# Patient Record
Sex: FEMALE | Race: WHITE | Employment: OTHER | URBAN - METROPOLITAN AREA
[De-identification: names, ages, dates, MRNs, and addresses within clinical notes are randomized per-mention and may not be internally consistent; named-entity substitution may affect disease eponyms.]

---

## 2017-01-20 DIAGNOSIS — E78.5 DYSLIPIDEMIA: ICD-10-CM

## 2017-01-23 RX ORDER — ROSUVASTATIN CALCIUM 20 MG/1
TABLET, COATED ORAL
Qty: 90 TAB | Refills: 0 | Status: SHIPPED | OUTPATIENT
Start: 2017-01-23 | End: 2017-05-02 | Stop reason: SDUPTHER

## 2017-01-23 NOTE — TELEPHONE ENCOUNTER
Requested Prescriptions     Signed Prescriptions Disp Refills    rosuvastatin (CRESTOR) 20 mg tablet 90 Tab 0     Sig: TAKE 1 TABLET NIGHTLY     Authorizing Provider: Radha Rome     Ordering User: Dedrick Levin

## 2017-02-15 ENCOUNTER — TELEPHONE (OUTPATIENT)
Dept: CARDIOLOGY CLINIC | Age: 77
End: 2017-02-15

## 2017-02-15 ENCOUNTER — APPOINTMENT (OUTPATIENT)
Dept: GENERAL RADIOLOGY | Age: 77
DRG: 247 | End: 2017-02-15
Attending: EMERGENCY MEDICINE
Payer: MEDICARE

## 2017-02-15 ENCOUNTER — HOSPITAL ENCOUNTER (INPATIENT)
Age: 77
LOS: 1 days | Discharge: HOME | DRG: 247 | End: 2017-02-17
Attending: EMERGENCY MEDICINE | Admitting: INTERNAL MEDICINE
Payer: MEDICARE

## 2017-02-15 DIAGNOSIS — R77.8 ELEVATED TROPONIN: ICD-10-CM

## 2017-02-15 DIAGNOSIS — I15.9 SECONDARY HYPERTENSION: ICD-10-CM

## 2017-02-15 DIAGNOSIS — R07.89 OTHER CHEST PAIN: Primary | ICD-10-CM

## 2017-02-15 PROBLEM — I10 HTN (HYPERTENSION): Status: ACTIVE | Noted: 2017-02-15

## 2017-02-15 PROBLEM — R07.9 CHEST PAIN: Status: ACTIVE | Noted: 2017-02-15

## 2017-02-15 LAB
ALBUMIN SERPL BCP-MCNC: 3.6 G/DL (ref 3.5–5)
ALBUMIN/GLOB SERPL: 0.9 {RATIO} (ref 1.1–2.2)
ALP SERPL-CCNC: 39 U/L (ref 45–117)
ALT SERPL-CCNC: 21 U/L (ref 12–78)
ANION GAP BLD CALC-SCNC: 13 MMOL/L (ref 5–15)
AST SERPL W P-5'-P-CCNC: 21 U/L (ref 15–37)
BASOPHILS # BLD AUTO: 0 K/UL (ref 0–0.1)
BASOPHILS # BLD: 0 % (ref 0–1)
BILIRUB SERPL-MCNC: 0.2 MG/DL (ref 0.2–1)
BNP SERPL-MCNC: 286 PG/ML (ref 0–450)
BUN SERPL-MCNC: 27 MG/DL (ref 6–20)
BUN/CREAT SERPL: 33 (ref 12–20)
CALCIUM SERPL-MCNC: 9.3 MG/DL (ref 8.5–10.1)
CHLORIDE SERPL-SCNC: 105 MMOL/L (ref 97–108)
CK MB CFR SERPL CALC: 1.6 % (ref 0–2.5)
CK MB CFR SERPL CALC: 2.3 % (ref 0–2.5)
CK MB SERPL-MCNC: 1 NG/ML (ref 5–25)
CK MB SERPL-MCNC: 1.8 NG/ML (ref 5–25)
CK SERPL-CCNC: 63 U/L (ref 26–192)
CK SERPL-CCNC: 77 U/L (ref 26–192)
CO2 SERPL-SCNC: 24 MMOL/L (ref 21–32)
CREAT SERPL-MCNC: 0.81 MG/DL (ref 0.55–1.02)
EOSINOPHIL # BLD: 0.2 K/UL (ref 0–0.4)
EOSINOPHIL NFR BLD: 3 % (ref 0–7)
ERYTHROCYTE [DISTWIDTH] IN BLOOD BY AUTOMATED COUNT: 14.3 % (ref 11.5–14.5)
GLOBULIN SER CALC-MCNC: 4.2 G/DL (ref 2–4)
GLUCOSE BLD STRIP.AUTO-MCNC: 166 MG/DL (ref 65–100)
GLUCOSE SERPL-MCNC: 126 MG/DL (ref 65–100)
HCT VFR BLD AUTO: 41.2 % (ref 35–47)
HGB BLD-MCNC: 13.1 G/DL (ref 11.5–16)
INR PPP: 1 (ref 0.9–1.1)
LIPASE SERPL-CCNC: 215 U/L (ref 73–393)
LYMPHOCYTES # BLD AUTO: 38 % (ref 12–49)
LYMPHOCYTES # BLD: 3.1 K/UL (ref 0.8–3.5)
MCH RBC QN AUTO: 26.6 PG (ref 26–34)
MCHC RBC AUTO-ENTMCNC: 31.8 G/DL (ref 30–36.5)
MCV RBC AUTO: 83.7 FL (ref 80–99)
MONOCYTES # BLD: 0.4 K/UL (ref 0–1)
MONOCYTES NFR BLD AUTO: 5 % (ref 5–13)
NEUTS SEG # BLD: 4.3 K/UL (ref 1.8–8)
NEUTS SEG NFR BLD AUTO: 54 % (ref 32–75)
PLATELET # BLD AUTO: 431 K/UL (ref 150–400)
POTASSIUM SERPL-SCNC: 4.1 MMOL/L (ref 3.5–5.1)
PROT SERPL-MCNC: 7.8 G/DL (ref 6.4–8.2)
PROTHROMBIN TIME: 10.3 SEC (ref 9–11.1)
RBC # BLD AUTO: 4.92 M/UL (ref 3.8–5.2)
SERVICE CMNT-IMP: ABNORMAL
SODIUM SERPL-SCNC: 142 MMOL/L (ref 136–145)
TROPONIN I SERPL-MCNC: 0.14 NG/ML
TROPONIN I SERPL-MCNC: 1.04 NG/ML
WBC # BLD AUTO: 8 K/UL (ref 3.6–11)

## 2017-02-15 PROCEDURE — 80053 COMPREHEN METABOLIC PANEL: CPT | Performed by: EMERGENCY MEDICINE

## 2017-02-15 PROCEDURE — 74011250637 HC RX REV CODE- 250/637: Performed by: INTERNAL MEDICINE

## 2017-02-15 PROCEDURE — 93005 ELECTROCARDIOGRAM TRACING: CPT

## 2017-02-15 PROCEDURE — 96374 THER/PROPH/DIAG INJ IV PUSH: CPT

## 2017-02-15 PROCEDURE — 71020 XR CHEST PA LAT: CPT

## 2017-02-15 PROCEDURE — 99218 HC RM OBSERVATION: CPT

## 2017-02-15 PROCEDURE — 83880 ASSAY OF NATRIURETIC PEPTIDE: CPT | Performed by: EMERGENCY MEDICINE

## 2017-02-15 PROCEDURE — 36415 COLL VENOUS BLD VENIPUNCTURE: CPT | Performed by: EMERGENCY MEDICINE

## 2017-02-15 PROCEDURE — 74011250637 HC RX REV CODE- 250/637: Performed by: EMERGENCY MEDICINE

## 2017-02-15 PROCEDURE — 84484 ASSAY OF TROPONIN QUANT: CPT | Performed by: EMERGENCY MEDICINE

## 2017-02-15 PROCEDURE — 74011250636 HC RX REV CODE- 250/636: Performed by: INTERNAL MEDICINE

## 2017-02-15 PROCEDURE — 85025 COMPLETE CBC W/AUTO DIFF WBC: CPT | Performed by: EMERGENCY MEDICINE

## 2017-02-15 PROCEDURE — 96375 TX/PRO/DX INJ NEW DRUG ADDON: CPT

## 2017-02-15 PROCEDURE — 82550 ASSAY OF CK (CPK): CPT | Performed by: EMERGENCY MEDICINE

## 2017-02-15 PROCEDURE — 82962 GLUCOSE BLOOD TEST: CPT

## 2017-02-15 PROCEDURE — 99285 EMERGENCY DEPT VISIT HI MDM: CPT

## 2017-02-15 PROCEDURE — 83690 ASSAY OF LIPASE: CPT | Performed by: EMERGENCY MEDICINE

## 2017-02-15 PROCEDURE — 85610 PROTHROMBIN TIME: CPT | Performed by: EMERGENCY MEDICINE

## 2017-02-15 RX ORDER — UBIDECARENONE 50 MG
100 CAPSULE ORAL DAILY
Status: DISCONTINUED | OUTPATIENT
Start: 2017-02-16 | End: 2017-02-17 | Stop reason: HOSPADM

## 2017-02-15 RX ORDER — GUAIFENESIN 100 MG/5ML
81 LIQUID (ML) ORAL DAILY
Status: DISCONTINUED | OUTPATIENT
Start: 2017-02-16 | End: 2017-02-15

## 2017-02-15 RX ORDER — PREGABALIN 75 MG/1
150 CAPSULE ORAL 2 TIMES DAILY
Status: DISCONTINUED | OUTPATIENT
Start: 2017-02-15 | End: 2017-02-17 | Stop reason: HOSPADM

## 2017-02-15 RX ORDER — TRAMADOL HYDROCHLORIDE 50 MG/1
50 TABLET ORAL
Status: DISCONTINUED | OUTPATIENT
Start: 2017-02-15 | End: 2017-02-17 | Stop reason: HOSPADM

## 2017-02-15 RX ORDER — ERGOCALCIFEROL 1.25 MG/1
50000 CAPSULE ORAL
Status: DISCONTINUED | OUTPATIENT
Start: 2017-02-19 | End: 2017-02-17 | Stop reason: HOSPADM

## 2017-02-15 RX ORDER — PREGABALIN 150 MG/1
150 CAPSULE ORAL 2 TIMES DAILY
COMMUNITY
End: 2018-01-21

## 2017-02-15 RX ORDER — INSULIN LISPRO 100 [IU]/ML
INJECTION, SOLUTION INTRAVENOUS; SUBCUTANEOUS
Status: DISCONTINUED | OUTPATIENT
Start: 2017-02-15 | End: 2017-02-17 | Stop reason: HOSPADM

## 2017-02-15 RX ORDER — SODIUM CHLORIDE 0.9 % (FLUSH) 0.9 %
5-10 SYRINGE (ML) INJECTION AS NEEDED
Status: DISCONTINUED | OUTPATIENT
Start: 2017-02-15 | End: 2017-02-17 | Stop reason: HOSPADM

## 2017-02-15 RX ORDER — DEXTROSE 50 % IN WATER (D50W) INTRAVENOUS SYRINGE
12.5-25 AS NEEDED
Status: DISCONTINUED | OUTPATIENT
Start: 2017-02-15 | End: 2017-02-17 | Stop reason: HOSPADM

## 2017-02-15 RX ORDER — ASPIRIN 325 MG
325 TABLET ORAL DAILY
Status: DISCONTINUED | OUTPATIENT
Start: 2017-02-16 | End: 2017-02-16

## 2017-02-15 RX ORDER — ENOXAPARIN SODIUM 100 MG/ML
40 INJECTION SUBCUTANEOUS EVERY 24 HOURS
Status: DISCONTINUED | OUTPATIENT
Start: 2017-02-15 | End: 2017-02-15

## 2017-02-15 RX ORDER — FENOFIBRATE 145 MG/1
145 TABLET, COATED ORAL EVERY EVENING
Status: DISCONTINUED | OUTPATIENT
Start: 2017-02-16 | End: 2017-02-17 | Stop reason: HOSPADM

## 2017-02-15 RX ORDER — MORPHINE SULFATE 2 MG/ML
2 INJECTION, SOLUTION INTRAMUSCULAR; INTRAVENOUS
Status: DISCONTINUED | OUTPATIENT
Start: 2017-02-15 | End: 2017-02-17 | Stop reason: HOSPADM

## 2017-02-15 RX ORDER — THERA TABS 400 MCG
1 TAB ORAL DAILY
Status: DISCONTINUED | OUTPATIENT
Start: 2017-02-16 | End: 2017-02-17 | Stop reason: HOSPADM

## 2017-02-15 RX ORDER — FLUTICASONE PROPIONATE 50 MCG
2 SPRAY, SUSPENSION (ML) NASAL
Status: DISCONTINUED | OUTPATIENT
Start: 2017-02-15 | End: 2017-02-17 | Stop reason: HOSPADM

## 2017-02-15 RX ORDER — METFORMIN HYDROCHLORIDE 1000 MG/1
1000 TABLET ORAL 2 TIMES DAILY WITH MEALS
Status: ON HOLD | COMMUNITY
End: 2017-02-16

## 2017-02-15 RX ORDER — MAGNESIUM SULFATE 100 %
4 CRYSTALS MISCELLANEOUS AS NEEDED
Status: DISCONTINUED | OUTPATIENT
Start: 2017-02-15 | End: 2017-02-17 | Stop reason: HOSPADM

## 2017-02-15 RX ORDER — LISINOPRIL 5 MG/1
10 TABLET ORAL DAILY
Status: DISCONTINUED | OUTPATIENT
Start: 2017-02-16 | End: 2017-02-17 | Stop reason: HOSPADM

## 2017-02-15 RX ORDER — ROSUVASTATIN CALCIUM 10 MG/1
20 TABLET, COATED ORAL
Status: DISCONTINUED | OUTPATIENT
Start: 2017-02-15 | End: 2017-02-17 | Stop reason: HOSPADM

## 2017-02-15 RX ORDER — SODIUM CHLORIDE 0.9 % (FLUSH) 0.9 %
5-10 SYRINGE (ML) INJECTION EVERY 8 HOURS
Status: DISCONTINUED | OUTPATIENT
Start: 2017-02-15 | End: 2017-02-17 | Stop reason: HOSPADM

## 2017-02-15 RX ORDER — GUAIFENESIN 100 MG/5ML
81 LIQUID (ML) ORAL
COMMUNITY

## 2017-02-15 RX ORDER — CARVEDILOL 3.12 MG/1
3.12 TABLET ORAL 2 TIMES DAILY WITH MEALS
Status: DISCONTINUED | OUTPATIENT
Start: 2017-02-16 | End: 2017-02-17 | Stop reason: HOSPADM

## 2017-02-15 RX ORDER — ENOXAPARIN SODIUM 100 MG/ML
1 INJECTION SUBCUTANEOUS EVERY 12 HOURS
Status: DISCONTINUED | OUTPATIENT
Start: 2017-02-16 | End: 2017-02-16

## 2017-02-15 RX ORDER — THERA TABS 400 MCG
1 TAB ORAL DAILY
COMMUNITY

## 2017-02-15 RX ADMIN — PREGABALIN 150 MG: 75 CAPSULE ORAL at 21:59

## 2017-02-15 RX ADMIN — ESTROGENS, CONJUGATED 0.62 MG: 0.62 TABLET, FILM COATED ORAL at 22:28

## 2017-02-15 RX ADMIN — ROSUVASTATIN CALCIUM 20 MG: 10 TABLET, FILM COATED ORAL at 21:59

## 2017-02-15 RX ADMIN — NITROGLYCERIN 1 INCH: 20 OINTMENT TOPICAL at 17:27

## 2017-02-15 RX ADMIN — Medication 2 MG: at 23:16

## 2017-02-15 NOTE — PROGRESS NOTES
[facility]: MED RECONCILIATION    Comments/Recommendations:  Patient brought a list of her medications from her doctor's visit today. She is knowledgeable to doses and last time taken. Medications added:     · None    Medications removed:    · Chlorpheniramine 4mg  · Meclizine 12.5mg    Medications adjusted:    · Ergocalciferol 50,000 units every   · Lisinopril 5mg daily  · Premarin 0.625mg nightly    Information obtained from:   Patient    Significant PMH/Disease States:   Patient Active Problem List   Diagnosis Code    Takotsubo cardiomyopathy I51.81    CAD (coronary artery disease) I25.10    DM (diabetes mellitus) ([facility] [address] E11.9    Dyslipidemia E78.5     Past Medical History   Diagnosis Date    Arthritis     Fibromyalgia     High cholesterol     Hypertension     Other ill-defined conditions(799.89)      \"previous cardiac history\"     Chief Complaint for this Admission:   Chief Complaint   Patient presents with    Chest Pain    Hypertension     Allergies: Review of patient's allergies indicates no known allergies. Prior to Admission Medications:   Prior to Admission Medications   Prescriptions Last Dose Informant Patient Reported? Taking?   aspirin 81 mg chewable tablet 2/15/2017 at AM  Yes Yes   Sig: Take 81 mg by mouth daily. carvedilol (COREG) 3.125 mg tablet 2/15/2017 at AM  No Yes   Sig: TAKE 1 TABLET TWICE A DAY   co-enzyme Q-10 (CO Q-10) 50 mg capsule 2017 at PM  No Yes   Sig: Take 2 Caps by mouth daily. conjugated estrogens (PREMARIN) 0.625 mg tablet 2017 at PM  Yes Yes   Sig: Take 0.625 mg by mouth every evening.   ergocalciferol (VITAMIN D2) 50,000 unit capsule Unknown at Unknown time  Yes No   Sig: Take 50,000 Units by mouth every . fenofibric acid (TRILIPIX) 135 mg capsule 2017 at PM Self Yes Yes   Sig: Take 135 mg by mouth daily. fluticasone (FLONASE) 50 mcg/actuation nasal spray 2017 at PM Self Yes Yes   Si Sprays by Both Nostrils route nightly. lisinopril (PRINIVIL, ZESTRIL) 10 mg tablet   No No   Sig: TAKE 1 TABLET DAILY   Patient taking differently: Take 5 mg by mouth daily. metFORMIN (GLUCOPHAGE) 1,000 mg tablet 2/15/2017 at AM  Yes Yes   Sig: Take 1,000 mg by mouth two (2) times daily (with meals). omega-3 fatty acids-vitamin e (FISH OIL) 1,000 mg cap Unknown at Unknown time  Yes No   Sig: Take 1 Cap by mouth.   pregabalin (LYRICA) 150 mg capsule 2/15/2017 at AM  Yes Yes   Sig: Take 150 mg by mouth two (2) times a day. rosuvastatin (CRESTOR) 20 mg tablet 2/14/2017 at PM  No Yes   Sig: TAKE 1 TABLET NIGHTLY   Patient taking differently: Take 20 mg by mouth nightly. therapeutic multivitamin (THERAGRAN) tablet 2/14/2017 at PM  Yes Yes   Sig: Take 1 Tab by mouth daily. traMADol (ULTRAM) 50 mg tablet Unknown at Unknown time  Yes No   Sig: Take 50 mg by mouth every six (6) hours as needed for Pain.  Patient will take 2 tabs every 8 hours as needed      Facility-Administered Medications: None                Keiry Boyd, PharmD, BCPS  Contact:

## 2017-02-15 NOTE — IP AVS SNAPSHOT
Dionna Cooper 
 
 
 [address] [facility] [address] [address] 
[phone] Patient: Cresencio Gomez MRN: [id number] FWB:[id number] You are allergic to the following No active allergies Recent Documentation Height Weight BMI OB Status Smoking Status 1.676 m 83.2 kg 29.61 kg/m2 Hysterectomy Never Smoker Emergency Contacts Name Discharge Info Relation Home Work Mobile MoniMario DISCHARGE CAREGIVER [3] Spouse [3] [phone] [address] CAREGIVER [3] Child [2] [phone] About your hospitalization You were admitted on:  February 15, 2017 You last received care in the:  [facility] You were discharged on:  February 17, 2017 Unit phone number:  [phone] Why you were hospitalized Your primary diagnosis was:  Chest Pain Your diagnoses also included:  Dyslipidemia, Dm (Diabetes Mellitus) (Hcc), Cad (Coronary Artery Disease), Htn (Hypertension), Takotsubo Cardiomyopathy, Elevated Troponin, Nstemi (Non-St Elevated Myocardial Infarction) (formerly [facility]) Providers Seen During Your Hospitalizations Provider Role Specialty Primary office phone Scar Lopez MD Attending Provider Emergency Medicine [phone] Juan Manuel Rice MD Attending Provider Hospitalist [phone] Your Primary Care Physician (PCP) Primary Care Physician Office Phone Office Fax Razia Leiva [phone] Follow-up Information Follow up With Details Comments Contact Info Wojciech Mcguire MD On 3/2/2017 9 am with carotid duplex and see Dr Jai Cruz following  [address] [facility] 
[phone] [address] [facility] [address] 
[phone] Jana Fleming NP   [address] [place] [address] Primary Care [address] [place] [address] 
[phone] Your Appointments Thursday March 02, 2017  9:00 AM EST  
VASCULAR TEST with CONCEPCION MEDELLIN  
[facility] ([facility] SCHEDULING) [address] [facility] [address]  
[phone] Thursday March 02, 2017  9:40 AM EST HOSPITAL DISCHARGE with Victor Manuel Milan MD  
[facility] ([facility]) [address] [facility] [address]  
[phone] Current Discharge Medication List  
  
START taking these medications Dose & Instructions Dispensing Information Comments Morning Noon Evening Bedtime  
 ticagrelor 90 mg tablet Commonly known as:  [employer] Your next dose is: Today, Tomorrow Other:  _________ Dose:  90 mg Take 1 Tab by mouth every twelve (12) hours every twelve (12) hours. Indications: Thrombosis Prevention after PCI Quantity:  60 Tab Refills:  11 CONTINUE these medications which have CHANGED Dose & Instructions Dispensing Information Comments Morning Noon Evening Bedtime  
 lisinopril 10 mg tablet Commonly known as:  Neelam Hilton What changed:   
- how much to take 
- how to take this - when to take this 
- additional instructions Your next dose is: Today, Tomorrow Other:  _________ TAKE 1 TABLET DAILY Quantity:  90 Tab Refills:  3  
     
   
   
   
  
 rosuvastatin 20 mg tablet Commonly known as:  CRESTOR What changed:   
- how much to take 
- how to take this - when to take this 
- additional instructions Your next dose is: Today, Tomorrow Other:  _________ TAKE 1 TABLET NIGHTLY Quantity:  90 Tab Refills:  0 CONTINUE these medications which have NOT CHANGED Dose & Instructions Dispensing Information Comments Morning Noon Evening Bedtime  
 aspirin 81 mg chewable tablet Your next dose is: Today, Tomorrow Other:  _________ Dose:  81 mg Take 81 mg by mouth daily. Refills:  0  
     
   
   
   
  
 carvedilol 3.125 mg tablet Commonly known as:  Timbo Lindsborg Your next dose is: Today, Tomorrow Other:  _________ TAKE 1 TABLET TWICE A DAY Quantity:  180 Tab Refills:  1 co-enzyme Q-10 50 mg capsule Commonly known as:  CO Q-10 Your next dose is: Today, Tomorrow Other:  _________ Dose:  100 mg Take 2 Caps by mouth daily. Quantity:  60 Cap Refills:  3 FISH OIL 1,000 mg Cap Generic drug:  omega-3 fatty acids-vitamin e Your next dose is: Today, Tomorrow Other:  _________ Dose:  1 Cap Take 1 Cap by mouth. Refills:  0  
     
   
   
   
  
 FLONASE 50 mcg/actuation nasal spray Generic drug:  fluticasone Your next dose is: Today, Tomorrow Other:  _________ Dose:  2 Spray 2 Sprays by Both Nostrils route nightly. Refills:  0  
     
   
   
   
  
 metFORMIN 1,000 mg tablet Commonly known as:  GLUCOPHAGE Start taking on:  2/19/2017 Your next dose is: Today, Tomorrow Other:  _________ Dose:  1000 mg Take 1 Tab by mouth two (2) times daily (with meals). Quantity:  1 Tab Refills:  1  
     
   
   
   
  
 pregabalin 150 mg capsule Commonly known as:  Trini Al Your next dose is: Today, Tomorrow Other:  _________ Dose:  150 mg Take 150 mg by mouth two (2) times a day. Refills:  0 PREMARIN 0.625 mg tablet Generic drug:  conjugated estrogens Your next dose is: Today, Tomorrow Other:  _________ Dose:  0.625 mg Take 0.625 mg by mouth every evening. Refills:  0  
     
   
   
   
  
 therapeutic multivitamin tablet Commonly known as:  [facility] Your next dose is: Today, Tomorrow Other:  _________ Dose:  1 Tab Take 1 Tab by mouth daily. Refills:  0  
     
   
   
   
  
 traMADol 50 mg tablet Commonly known as:  ULTRAM  
   
Your next dose is: Today, Tomorrow Other:  _________ Dose:  50 mg Take 50 mg by mouth every six (6) hours as needed for Pain. Patient will take 2 tabs every 8 hours as needed Refills:  0  
     
   
   
   
  
 TRILIPIX 135 mg capsule Generic drug:  fenofibric acid Your next dose is: Today, Tomorrow Other:  _________ Dose:  135 mg Take 135 mg by mouth daily. Refills:  0  
     
   
   
   
  
 VITAMIN D2 50,000 unit capsule Generic drug:  ergocalciferol Your next dose is: Today, Tomorrow Other:  _________ Dose:  00448 Units Take 50,000 Units by mouth every Sunday. Refills:  0 Where to Get Your Medications Information on where to get these meds will be given to you by the nurse or doctor. ! Ask your nurse or doctor about these medications  
  metFORMIN 1,000 mg tablet  
 ticagrelor 90 mg tablet Discharge Instructions ACUTE DIAGNOSES: 
chest pain Chest pain Chest pain NSTEMI (non-ST elevated myocardial infarction) ([facility] NSTEMI (non-ST elevated myocardial infarction) ([facility] CHRONIC MEDICAL DIAGNOSES: 
Problem List as of 2/17/2017  Date Reviewed: 2/17/2017 Codes Class Noted - Resolved NSTEMI (non-ST elevated myocardial infarction) ([facility] ICD-10-CM: I21.4 ICD-9-CM: 410.70  2/17/2017 - Present HTN (hypertension) ICD-10-CM: I10 
ICD-9-CM: 401.9  2/15/2017 - Present Dyslipidemia ICD-10-CM: E78.5 ICD-9-CM: 272.4  6/25/2015 - Present Takotsubo cardiomyopathy ICD-10-CM: I51.81 
ICD-9-CM: 429.83  12/12/2012 - Present CAD (coronary artery disease) ICD-10-CM: I25.10 ICD-9-CM: 414.00  12/12/2012 - Present  Overview Addendum 12/12/2012 12:20 PM by Idania Osborne MD  
 CATH 12/11/12 Mid LAD: There was a diffuse 30 % stenosis. Distal LAD: There was a diffuse 20 % stenosis. Proximal circumflex: There was a discrete 60 % stenosis. Mid circumflex: There was a diffuse 60 % stenosis. 1st obtuse marginal: There was a discrete 20 % stenosis in the proximal third of the vessel segment. Proximal RCA: There was a discrete 20 % stenosis at a site with no prior intervention. Mid RCA: There was a discrete 20 % stenosis. Distal RCA: There was a 20 % stenosis. DM (diabetes mellitus) ([facility] ICD-10-CM: E11.9 ICD-9-CM: 250.00  12/12/2012 - Present * (Principal)RESOLVED: Chest pain ICD-10-CM: R07.9 ICD-9-CM: 786.50  2/15/2017 - 2/17/2017 RESOLVED: Elevated troponin ICD-10-CM: R79.89 ICD-9-CM: 790.6  2/15/2017 - 2/17/2017 RESOLVED: NSTEMI (non-ST elevated myocardial infarction) ([facility] ICD-10-CM: I21.4 ICD-9-CM: 410.70  12/10/2012 - 12/12/2012 DISCHARGE MEDICATIONS:  
  
 
 
· It is important that you take the medication exactly as they are prescribed. · Keep your medication in the bottles provided by the pharmacist and keep a list of the medication names, dosages, and times to be taken in your wallet. · Do not take other medications without consulting your doctor. DIET:  Cardiac Diet and Diabetic Diet ACTIVITY: Activity as tolerated ADDITIONAL INFORMATION: If you experience any of the following symptoms then please call your primary care physician or return to the emergency room if you cannot get hold of your doctor: Fever, chills, nausea, vomiting, diarrhea, change in mentation, falling, bleeding, shortness of breath. FOLLOW UP CARE: 
Dr. Scooby Canas, NP  you are to call and set up an appointment to see them in 2 weeks. Follow-up with Dr Rahel Brownlee on 3/2/17 at 9 am 
 
 
Information obtained by : 
I understand that if any problems occur once I am at home I am to contact my physician. I understand and acknowledge receipt of the instructions indicated above. Physician's or R.N.'s Signature                                                                  Date/Time Patient or Representative Signature                                                          Date/Time [facility] Office: [facility], [address] [place] [address] 5 1007 [facility] 
    [phone] [place] office:  [facility],[address] [place]  
                           [phone] Patient Discharge Instructions Ethan Holcomb / [id number] : 1940 Admitted 2/15/2017 Discharged: 2017 Cardiologist: Dr Vega Varner       PCP: Chaparro Lam NP Renal:   Pulm: 
Diagnosis:  
Patient Active Problem List  
Diagnosis Code  Takotsubo cardiomyopathy I51.81  
 CAD (coronary artery disease) I25.10  DM (diabetes mellitus) ([facility] E11.9  Dyslipidemia E78.5  Chest pain R07.9  
 HTN (hypertension) I10  
 Elevated troponin R79.89 Procedures/Test:   CATH 2017: stent to left circumflex Lab Results Component Value Date/Time Cholesterol, total 272 2015 10:42 AM  
Cholesterol, Total 150 10/14/2015 11:39 AM  
HDL Cholesterol 62 2015 10:42 AM  
LDL, calculated 185 2015 10:42 AM  
Triglyceride 389 2015 10:42 AM  
CHOL/HDL Ratio 5.2 2012 04:27 AM  
 
 
Lab Results Component Value Date/Time ALT (SGPT) 21 02/15/2017 04:20 PM  
AST (SGOT) 21 02/15/2017 04:20 PM  
Alk. phosphatase 39 02/15/2017 04:20 PM  
Bilirubin, total 0.2 02/15/2017 04:20 PM  
 
 
 
· It is important that you take the medication exactly as they are prescribed. · Keep your medication in the bottles provided by the pharmacist and keep a list of the medication names, dosages, and times to be taken in your wallet. · Do not take other medications without consulting your doctor. BRING ALL of YOUR MEDICINES TO YOUR OFFICE VISIT with .  
 
Cardiac Catheterization  Discharge Instructions *Check the puncture site frequently for swelling or bleeding. If you see any bleeding, lie down and apply pressure over the area with a clean town or washcloth. Notify your doctor for any redness, swelling, drainage or oozing from the puncture site. Notify your doctor for any fever or chills. *If the leg or arm with the puncture becomes cold, numb or painful, call Dr Rosalino Queen *Activity should be limited for the next 48 hours. Climb stairs as little as possible and avoid any stooping, bending or strenuous activity for 48 hours. No strenuous activity or heavy lifting over 10 lbs. for 7 days. · You may take a shower 24 hours after your cardiac catheterization. Be sure to get the dressing wet and then remove it; gently wash the area with warm soapy water. Pat dry and leave open to air. To help prevent infections, be sure to keep the cath site clean and dry. No lotions, creams, powders, ointments, etc. in the cath site for approximately 1 week. · Do not take a tub bath, get in a hot tub or swimming pool for approximately 5 days or until the cath site is completely healed. · Drink plenty of fluids for 24-48 hours after your cath to flush the contrast dye from your kidneys. No alcoholic beverages for 24 hours. You may resume your previous diet (low fat, low cholesterol) after your cath. · Do not drive or operate heavy machinery for 48 hours. · You may resume your usual diet. Drink more fluids than usual. 
· Have a responsible person drive you home and stay with you for at least 24 hours after your heart catheterization/angiography. · *After your cath, some bruising or discomfort is common during the healing process. Tylenol, 1-2 tablets every 6 hours as needed, is recommended if you experience any discomfort. If you experience any signs or symptoms of infection such as fever, chills, or poorly healing incision, persistent tenderness or swelling in the groin, redness and/or warmth to the touch, numbness, significant tingling or pain at the groin site or affected extremity, rash, drainage from the cath site, or if the leg feels tight or swollen, call your physician right away. ? If bleeding at the cath site occurs, take a clean gauze pad and apply direct pressure to the groin just above the puncture site. Call 911 immediately, and continue to apply direct pressure until an ambulance gets to your location. ? You may return to work  2  days after your cardiac cath if no groin bleeding. Activity: Resume normal activity letting fatigue be the guide. Do not lift over 10 pounds for 7 days. Diet: American Heart association, low fat, 1500 mg sodium  Diabetic. Call for or return to ER for recurrent or prolonged chest pain, shortness of breath, lightheadedness, dizzy, palpitations, passing out, swelling in the legs or abdomen, pain or swelling  At the cath site. Lifestyle changes IF you smoke, Stop smoking go to : PrimeLetters.ca. aspx for text reminders Watch [place] over [employer] a heart-healthy diet that is low in cholesterol, saturated fat, and salt, and is full of fruits, vegetables and whole-grains. Eat at least two servings of fish each week. You may get more details about how to eat healthy, but these tips can help you get started. Www.aha.com When should you call for help? Call 911 anytime you think you may need emergency care. For example, call if: You have signs of a heart attack. These may include: 
Chest pain or pressure. Sweating. Shortness of breath. Nausea or vomiting. Pain that spreads from the chest to the neck, jaw, or one or both shoulders or arms. Dizziness or lightheadedness. A fast or irregular pulse. After calling 911, chew 1 adult-strength aspirin. Wait for an ambulance. Do not try to drive yourself. You passed out (lost consciousness). You feel like you are having another heart attack. Call your doctor now or seek immediate medical care if: 
You have had any chest pain, even if it has gone away. You have new or increased shortness of breath. You are dizzy or lightheaded, or you feel like you may faint. Watch closely for changes in your health, and be sure to contact your doctor if you have any problem Information obtained by : 
I understand that if any problems occur once I am at home I am to contact my physician. I understand and acknowledge receipt of the instructions indicated above. R.N.'s Signature                                                                  Date/Time Patient or Representative Signature                                                          Date/Time Heart Attack: Care Instructions Your Care Instructions A heart attack (myocardial infarction, or MI) occurs when one or more of the coronary arteries, which supply the heart with oxygen-rich blood, is blocked. A blockage usually occurs when plaque inside the artery breaks open and a blood clot forms in the artery. After a heart attack, you may be worried about your future.  Over the next several weeks, your heart will start to heal. Though it can be hard to break old habits, you can prevent another heart attack by making some lifestyle changes and by taking medicines. You may use this information for ideas about what to do at home to speed your recovery. Follow-up care is a key part of your treatment and safety. Be sure to make and go to all appointments, and call your doctor if you are having problems. It's also a good idea to know your test results and keep a list of the medicines you take. How can you care for yourself at home? Activity · Until your doctor says it is okay, do not do strenuous exercise. And do not lift, pull, or push anything heavy. Ask your doctor what types of activities are safe for you. · If your doctor has not set you up with a cardiac rehabilitation (rehab) program, talk to him or her about whether that is right for you. Cardiac rehab includes supervised exercise. It also includes help with diet and lifestyle changes and emotional support. It may reduce your risk of future heart problems. · Increase your activities slowly. Take short rest breaks when you get tired. · If your doctor recommends it, get more exercise. Walking is a good choice. Bit by bit, increase the amount you walk every day. Try for at least 30 minutes on most days of the week. You also may want to swim, bike, or do other activities. Talk with your doctor before you start an exercise program to make sure it is safe for you. · Ask your doctor when you can drive, go back to work, and do other daily activities again. · You can have sex as soon as you feel ready for it. Often this means when you can easily walk around or climb stairs. Talk with your doctor if you have any concerns. If you are taking nitroglycerin, do not take erection-enhancing medicine such as sildenafil (Viagra), tadalafil (Cialis), or vardenafil (Levitra) . Lifestyle changes · Do not smoke. Smoking increases your risk of another heart attack.  If you need help quitting, talk to your doctor about stop-smoking programs and medicines. These can increase your chances of quitting for good. · Eat a heart-healthy diet that is low in saturated fat and salt, and is full of fruits, vegetables and whole-grains. Eat at least two servings of fish each week. You may get more details about how to eat healthy. But these tips can help you get started. · Stay at a healthy weight, or lose weight if you need to. Medicines · Be safe with medicines. Take your medicines exactly as prescribed. Call your doctor if you think you are having a problem with your medicine. You will get more details on the specific medicines your doctor prescribes. Do not stop taking your medicine unless your doctor tells you to. Not taking your medicine might raise your risk of having another heart attack. · You may need several medicines to help lower your risk of another heart attack. These include: ¨ Blood pressure medicines such as angiotensin-converting enzyme (ACE) inhibitors, ARBs (angiotensin II receptor blockers), and beta-blockers. ¨ Cholesterol medicine called statins. ¨ Aspirin and other blood thinners. These prevent blood clots that can cause a heart attack. · If your doctor has given you nitroglycerin, keep it with you at all times. If you have angina symptoms, such as chest pain or pressure, sit down and rest. Take the first dose of nitroglycerin as directed. If symptoms get worse or are not getting better within 5 minutes, call 911 right away. Stay on the phone. The emergency  will tell you what to do. · Do not take any over-the-counter medicines, vitamins, or herbal products without talking to your doctor first. 
Staying healthy · Manage other health conditions such as high blood pressure and diabetes. · Avoid colds and flu. Get a pneumococcal vaccine shot. If you have had one before, ask your doctor whether you need another dose.  Get the flu vaccine every year. If you must be around people with colds or flu, wash your hands often. · Be sure to tell your doctor about any angina symptoms you have had, even if they went away. Pay attention to your angina symptoms. Know what is typical for you and learn how to control it. Know when to call for help. · Talk to your family, friends, or a counselor about your feelings. It is normal to feel frightened, angry, hopeless, helpless, and even guilty. Talking openly about bad feelings can help you cope. If you have symptoms of depression, talk to your doctor. When should you call for help? Call 911 anytime you think you may need emergency care. For example, call if: 
· You have symptoms of a heart attack. These may include: ¨ Chest pain or pressure, or a strange feeling in the chest. 
¨ Sweating. ¨ Shortness of breath. ¨ Nausea or vomiting. ¨ Pain, pressure, or a strange feeling in the back, neck, jaw, or upper belly or in one or both shoulders or arms. ¨ Lightheadedness or sudden weakness. ¨ A fast or irregular heartbeat. After you call 911, the  may tell you to chew 1 adult-strength or 2 to 4 low-dose aspirin. Wait for an ambulance. Do not try to drive yourself. · You have angina symptoms (such as chest pain or pressure) that do not go away with rest or are not getting better within 5 minutes after you take a dose of nitroglycerin. · You passed out (lost consciousness). · You feel like you are having another heart attack. Call your doctor now or seek immediate medical care if: 
· You are having angina symptoms, such as chest pain or pressure, more often than usual, or the symptoms are different or worse than usual. 
· You have new or increased shortness of breath. · You are dizzy or lightheaded, or you feel like you may faint. Watch closely for changes in your health, and be sure to contact your doctor if you have any problems. Where can you learn more? Go to http://marty-mario.info/. Enter [id number] in the search box to learn more about \"Heart Attack: Care Instructions. \" Current as of: January 27, 2016 Content Version: 11.1 © 6226-8232 [employer]. Care instructions adapted under license by [employer] (which disclaims liability or warranty for this information). If you have questions about a medical condition or this instruction, always ask your healthcare professional. [address] any warranty or liability for your use of this information. Discharge Instructions Attachments/References TICAGRELOR (BY MOUTH) (ENGLISH) HEART FAILURE: AVOIDING TRIGGERS (ENGLISH) Discharge Orders None [facility] Announcement We are excited to announce that we are making your provider's discharge notes available to you in [facility]. You will see these notes when they are completed and signed by the physician that discharged you from your recent hospital stay. If you have any questions or concerns about any information you see in [facility], please call the Health Information Department where you were seen or reach out to your Primary Care Provider for more information about your plan of care. Introducing \[facility] [place] [facility]\"" & HEALTH SERVICES! Miguel Melo introduces [facility] patient portal. Now you can access parts of your medical record, email your doctor's office, and request medication refills online. 1. In your internet browser, go to [email or web address] 2. Click on the First Time User? Click Here link in the Sign In box. You will see the New Member Sign Up page. 3. Enter your [facility] Access Code exactly as it appears below. You will not need to use this code after youve completed the sign-up process. If you do not sign up before the expiration date, you must request a new code. · [facility] Access Code: [email or web address] Expires: 5/16/2017  3:58 PM 
 
 4. Enter the last four digits of your Social Security Number (xxxx) and Date of Birth (mm/dd/yyyy) as indicated and click Submit. You will be taken to the next sign-up page. 5. Create a PurpleTeal ID. This will be your PurpleTeal login ID and cannot be changed, so think of one that is secure and easy to remember. 6. Create a PurpleTeal password. You can change your password at any time. 7. Enter your Password Reset Question and Answer. This can be used at a later time if you forget your password. 8. Enter your e-mail address. You will receive e-mail notification when new information is available in [address] 9. Click Sign Up. You can now view and download portions of your medical record. 10. Click the Download Summary menu link to download a portable copy of your medical information. If you have questions, please visit the Frequently Asked Questions section of the [employer] website. Remember, [employer] is NOT to be used for urgent needs. For medical emergencies, dial 911. Now available from your iPhone and Android! General Information Please provide this summary of care documentation to your next provider. Patient Signature:  ____________________________________________________________ Date:  ____________________________________________________________  
  
Junior Stephan Provider Signature:  ____________________________________________________________ Date:  ____________________________________________________________ More Information Ticagrelor (By mouth) Ticagrelor ([id number]) Helps prevent stroke, heart attack, and other heart problems. This medicine is a blood thinner. Brand Name(s):Jose There may be other brand names for this medicine. When This Medicine Should Not Be Used: This medicine is not right for everyone.  Do not use it if you had an allergic reaction to ticagrelor, or if you have bleeding problems (such as a bleeding stomach ulcer) or a history of bleeding in your brain. How to Use This Medicine:  
Tablet · Your doctor will tell you how much medicine to use. Do not use more than directed. Take this medicine at the same time every day. · Your doctor may tell you to take aspirin with this medicine. Do not use more than 100 milligrams of aspirin per day. Check the labels of other medicines to make sure they do not contain aspirin. · If you cannot swallow the tablet, you may do this: ¨ Crush the tablet and mix it in a glass of water. Drink it right away. Rinse the glass with more water and drink that too, so you get all the medicine. ¨ You may give the tablet and water mixture through a nasogastric tube. Flush the tube with more water so you receive all the medicine. · This medicine should come with a Medication Guide. Ask your pharmacist for a copy if you do not have one. · Missed dose: Skip the missed dose and take your next dose as usual. Do not take extra medicine to make up for a missed dose. · Store the medicine in a closed container at room temperature, away from heat, moisture, and direct light. Drugs and Foods to Avoid: Ask your doctor or pharmacist before using any other medicine, including over-the-counter medicines, vitamins, and herbal products. · Some foods and medicines can affect how ticagrelor works. Tell your doctor if you are using any of the following: ¨ Atazanavir, carbamazepine, clarithromycin, digoxin, indinavir, itraconazole, ketoconazole, lovastatin, nefazodone, nelfinavir, phenobarbital, phenytoin, rifampin, ritonavir, saquinavir, simvastatin, telithromycin, voriconazole ¨ Blood thinner, including warfarin or heparin ¨ NSAID pain or arthritis medicine (including celecoxib, diclofenac, ibuprofen, naproxen) Warnings While Using This Medicine: · Tell your doctor if you are pregnant or breastfeeding, or if you have liver disease, lung or breathing problems (such as asthma or COPD), or a history of bleeding problems. · This medicine may cause you to bleed and bruise more easily, and it may take longer than usual for bleeding to stop. Be careful to avoid injuries. · Do not stop using this medicine unless your doctor tells you to. To stop it may increase your risk of a heart attack, blood clot, or other serious problem. · Tell any doctor or dentist who treats you that you are using this medicine. With your doctor's permission, you may need to stop using this medicine several days before you have surgery to reduce the risk of bleeding problems. Follow your doctor's instructions carefully. · Your doctor will do lab tests at regular visits to check on the effects of this medicine. Keep all appointments. · Keep all medicine out of the reach of children. Never share your medicine with anyone. Possible Side Effects While Using This Medicine:  
Call your doctor right away if you notice any of these side effects: · Allergic reaction: Itching or hives, swelling in your face or hands, swelling or tingling in your mouth or throat, chest tightness, trouble breathing · Bloody or black, tarry stools, red or dark brown urine · Fast, slow, or pounding heartbeat · Shortness of breath or trouble breathing · Unusual bleeding, bruising, or weakness · Vomiting of blood or material that looks like coffee grounds If you notice these less serious side effects, talk with your doctor:  
· Headache If you notice other side effects that you think are caused by this medicine, tell your doctor. Call your doctor for medical advice about side effects. You may report side effects to FDA at 5-811-FDA-9884 © 2016 [address] is for End User's use only and may not be sold, redistributed or otherwise used for commercial purposes. The above information is an  only.  It is not intended as medical advice for individual conditions or treatments. Talk to your doctor, nurse or pharmacist before following any medical regimen to see if it is safe and effective for you. Avoiding Triggers With Heart Failure: Care Instructions Your Care Instructions Triggers are anything that make your heart failure flare up. A flare-up is also called \"sudden heart failure\" or \"acute heart failure. \" When you have a flare-up, fluid builds up in your lungs, and you have problems breathing. You might need to go to the hospital. By watching for changes in your condition and avoiding triggers, you can prevent heart failure flare-ups. Follow-up care is a key part of your treatment and safety. Be sure to make and go to all appointments, and call your doctor if you are having problems. It's also a good idea to know your test results and keep a list of the medicines you take. How can you care for yourself at home? Watch for changes in your weight and condition · Weigh yourself without clothing at the same time each day. Record your weight. Call your doctor if you gain 3 pounds or more in 2 to 3 days. A sudden weight gain may mean that your heart failure is getting worse. · Keep a daily record of your symptoms. Write down any changes in how you feel, such as new shortness of breath, cough, or problems eating. Also record if your ankles are more swollen than usual and if you have to urinate in the night more often. Note anything that you ate or did that could have triggered these changes. Limit sodium Sodium causes your body to hold on to water, making it harder for your heart to pump. People get most of their sodium from processed foods. Fast food and restaurant meals also tend to be very high in sodium. · Your doctor may suggest that you limit sodium to 2,000 milligrams (mg) a day or less. That is less than 1 teaspoon of salt a day, including all the salt you eat in cooking or in packaged foods. · Read food labels on cans and food packages. They tell you how much sodium you get in one serving. Check the serving size. If you eat more than one serving, you are getting more sodium. · Be aware that sodium can come in forms other than salt, including monosodium glutamate (MSG), sodium citrate, and sodium bicarbonate (baking soda). MSG is often added to Asian food. You can sometimes ask for food without MSG or salt. · Slowly reducing salt will help you adjust to the taste. Take the salt shaker off the table. · Flavor your food with garlic, lemon juice, onion, vinegar, herbs, and spices instead of salt. Do not use soy sauce, steak sauce, onion salt, garlic salt, mustard, or ketchup on your food, unless it is labeled \"low-sodium\" or \"low-salt. \" 
· Make your own salad dressings, sauces, and ketchup without adding salt. · Use fresh or frozen ingredients, instead of canned ones, whenever you can. Choose low-sodium canned goods. · Eat less processed food and food from restaurants, including fast food. Exercise as directed Moderate, regular exercise is very good for your heart. It improves your blood flow and helps control your weight. But too much exercise can stress your heart and cause a heart failure flare-up. · Check with your doctor before you start an exercise program. 
· Walking is an easy way to get exercise. Start out slowly. Gradually increase the length and pace of your walk. Swimming, riding a bike, and using a treadmill are also good forms of exercise. · When you exercise, watch for signs that your heart is working too hard. You are pushing yourself too hard if you cannot talk while you are exercising. If you become short of breath or dizzy or have chest pain, stop, sit down, and rest. 
· Do not exercise when you do not feel well. Take medicines correctly · Take your medicines exactly as prescribed. Call your doctor if you think you are having a problem with your medicine. · Make a list of all the medicines you take. Include those prescribed to you by other doctors and any over-the-counter medicines, vitamins, or supplements you take. Take this list with you when you go to any doctor. · Take your medicines at the same time every day. It may help you to post a list of all the medicines you take every day and what time of day you take them. · Make taking your medicine as simple as you can. Plan times to take your medicines when you are doing other things, such as eating a meal or getting ready for bed. This will make it easier to remember to take your medicines. · Get organized. Use helpful tools, such as daily or weekly pill containers. When should you call for help? Call 911 if you have symptoms of sudden heart failure such as: 
· You have severe trouble breathing. · You cough up pink, foamy mucus. · You have a new irregular or rapid heartbeat. Call your doctor now or seek immediate medical care if: 
· You have new or increased shortness of breath. · You are dizzy or lightheaded, or you feel like you may faint. · You have sudden weight gain, such as 3 pounds or more in 2 to 3 days. · You have increased swelling in your legs, ankles, or feet. · You are suddenly so tired or weak that you cannot do your usual activities. Watch closely for changes in your health, and be sure to contact your doctor if you develop new symptoms. Where can you learn more? Go to http://marty-mario.info/. Enter G472 in the search box to learn more about \"Avoiding Triggers With Heart Failure: Care Instructions. \" Current as of: April 27, 2016 Content Version: 11.1 © 2045-7953 [employer]. Care instructions adapted under license by [employer] (which disclaims liability or warranty for this information).  If you have questions about a medical condition or this instruction, always ask your healthcare professional. [employer] disclaims any warranty or liability for your use of this information.

## 2017-02-15 NOTE — TELEPHONE ENCOUNTER
Received a call from PCP office,    Patient requested Urgent appt for HTN. Advised I can add patient on for 2/16/17. PCP advised patient will be going to the ER.

## 2017-02-15 NOTE — Clinical Note
Patient Class[de-identified] Observation [556] Type of Bed: Telemetry [19] Reason for Observation: chest pain Admitting Physician: Haider Martinez Attending Physician: Haider Martinez Diagnosis: chest pain

## 2017-02-15 NOTE — IP AVS SNAPSHOT
Current Discharge Medication List  
  
Take these medications at their scheduled times Dose & Instructions Dispensing Information Comments Morning Noon Evening Bedtime  
 aspirin 81 mg chewable tablet Your next dose is: Today, Tomorrow Other:  ____________ Dose:  81 mg Take 81 mg by mouth daily. Refills:  0  
     
   
   
   
  
 co-enzyme Q-10 50 mg capsule Commonly known as:  CO Q-10 Your next dose is: Today, Tomorrow Other:  ____________ Dose:  100 mg Take 2 Caps by mouth daily. Quantity:  60 Cap Refills:  3 FLONASE 50 mcg/actuation nasal spray Generic drug:  fluticasone Your next dose is: Today, Tomorrow Other:  ____________ Dose:  2 Spray 2 Sprays by Both Nostrils route nightly. Refills:  0  
     
   
   
   
  
 metFORMIN 1,000 mg tablet Commonly known as:  GLUCOPHAGE Start taking on:  2/19/2017 Your next dose is: Today, Tomorrow Other:  ____________ Dose:  1000 mg Take 1 Tab by mouth two (2) times daily (with meals). Quantity:  1 Tab Refills:  1  
     
   
   
   
  
 pregabalin 150 mg capsule Commonly known as:  Dolores Smith Your next dose is: Today, Tomorrow Other:  ____________ Dose:  150 mg Take 150 mg by mouth two (2) times a day. Refills:  0 PREMARIN 0.625 mg tablet Generic drug:  conjugated estrogens Your next dose is: Today, Tomorrow Other:  ____________ Dose:  0.625 mg Take 0.625 mg by mouth every evening. Refills:  0  
     
   
   
   
  
 therapeutic multivitamin tablet Commonly known as:  [facility] Your next dose is: Today, Tomorrow Other:  ____________ Dose:  1 Tab Take 1 Tab by mouth daily. Refills:  0  
     
   
   
   
  
 ticagrelor 90 mg tablet Commonly known as:  Frida [employer] Your next dose is: Today, Tomorrow Other:  ____________ Dose:  90 mg Take 1 Tab by mouth every twelve (12) hours every twelve (12) hours. Indications: Thrombosis Prevention after PCI Quantity:  60 Tab Refills:  11 TRILIPIX 135 mg capsule Generic drug:  fenofibric acid Your next dose is: Today, Tomorrow Other:  ____________ Dose:  135 mg Take 135 mg by mouth daily. Refills:  0  
     
   
   
   
  
 VITAMIN D2 50,000 unit capsule Generic drug:  ergocalciferol Your next dose is: Today, Tomorrow Other:  ____________ Dose:  28310 Units Take 50,000 Units by mouth every Sunday. Refills:  0 Take these medications as needed Dose & Instructions Dispensing Information Comments Morning Noon Evening Bedtime  
 traMADol 50 mg tablet Commonly known as:  ULTRAM  
   
Your next dose is: Today, Tomorrow Other:  ____________ Dose:  50 mg Take 50 mg by mouth every six (6) hours as needed for Pain. Patient will take 2 tabs every 8 hours as needed Refills:  0 Take these medications as directed Dose & Instructions Dispensing Information Comments Morning Noon Evening Bedtime  
 carvedilol 3.125 mg tablet Commonly known as:  Macel Malik Your next dose is: Today, Tomorrow Other:  ____________ TAKE 1 TABLET TWICE A DAY Quantity:  180 Tab Refills:  1 FISH OIL 1,000 mg Cap Generic drug:  omega-3 fatty acids-vitamin e Your next dose is: Today, Tomorrow Other:  ____________ Dose:  1 Cap Take 1 Cap by mouth. Refills:  0  
     
   
   
   
  
 lisinopril 10 mg tablet Commonly known as:  Geraldine Needy Your next dose is: Today, Tomorrow Other:  ____________ TAKE 1 TABLET DAILY Quantity:  90 Tab Refills:  3 rosuvastatin 20 mg tablet Commonly known as:  CRESTOR Your next dose is: Today, Tomorrow Other:  ____________ TAKE 1 TABLET NIGHTLY Quantity:  90 Tab Refills:  0 Where to Get Your Medications Information about where to get these medications is not yet available ! Ask your nurse or doctor about these medications  
  metFORMIN 1,000 mg tablet  
 ticagrelor 90 mg tablet

## 2017-02-15 NOTE — ED PROVIDER NOTES
HPI Comments: 68 y.o. female with past medical history significant for HTN and fibromyalgia who presents via EMS with chief complaint of chest pressure. The pt c/o chest tightness and pressure that was sudden in onset when she arrived to her PCP's office about 2 hours ago and has persisted since then with associated nausea. The pt explains that she had previous pain about 5 years ago and was told that she \"had broken heart syndrome. \" EMS reported that she had 4 baby ASA and 2 nitro en route. The pt says that she has an appointment with cardiology in 3 months and that her last check-up was normal. The pt reports mild ankle swelling in the morning that typically resolves. Denies vomiting, SOB, cough, diarrhea, abdominal pain, and change in appetite. There are no other acute medical concerns at this time. Old Chart Review: The pt was seen in the ED in December of 2014 for a NSTEMI. Social hx: Nonsmoker. PCP: Anny Rios NP  Cardiology: Dr. Umesh Nicole MD    Note written by Rickie Duarte, as dictated by Fernando Titus MD 3:32 PM      The history is provided by the patient. No  was used. Past Medical History:   Diagnosis Date    Arthritis     Fibromyalgia     High cholesterol     Hypertension     Other ill-defined conditions(049.89)      \"previous cardiac history\"       Past Surgical History:   Procedure Laterality Date    Hx orthopaedic      Hx gyn       hysterectomy         Family History:   Problem Relation Age of Onset    Heart Attack Father     High Cholesterol Father        Social History     Social History    Marital status:      Spouse name: N/A    Number of children: N/A    Years of education: N/A     Occupational History    Not on file.      Social History Main Topics    Smoking status: Never Smoker    Smokeless tobacco: Not on file    Alcohol use No    Drug use: Not on file    Sexual activity: Not on file     Other Topics Concern    Not on file Social History Narrative         ALLERGIES: Review of patient's allergies indicates no known allergies. Review of Systems   Constitutional: Negative for appetite change. Respiratory: Positive for chest tightness. Negative for cough and shortness of breath. Cardiovascular: Positive for chest pain (pressure). Gastrointestinal: Positive for nausea. Negative for abdominal pain, diarrhea and vomiting. All other systems reviewed and are negative. There were no vitals filed for this visit. Physical Exam   Constitutional: She is oriented to person, place, and time. She appears well-developed and well-nourished. NAD, AxOx4, speaking in complete sentences     HENT:   Head: Normocephalic and atraumatic. Nose: Nose normal.   Mouth/Throat: Oropharynx is clear and moist.   Eyes: Conjunctivae and EOM are normal. Pupils are equal, round, and reactive to light. Right eye exhibits no discharge. Left eye exhibits no discharge. No scleral icterus. Neck: Normal range of motion. Neck supple. No JVD present. No tracheal deviation present. Cardiovascular: Normal rate, regular rhythm, normal heart sounds and intact distal pulses. Exam reveals no gallop and no friction rub. No murmur heard. Pulmonary/Chest: Effort normal and breath sounds normal. No respiratory distress. She has no wheezes. She has no rales. She exhibits no tenderness. Abdominal: Soft. Bowel sounds are normal. There is no tenderness. There is no rebound and no guarding. nttp     Genitourinary: No vaginal discharge found. Genitourinary Comments: Pt denies urinary/ vaginal complaints   Musculoskeletal: Normal range of motion. She exhibits no edema or tenderness. Neurological: She is alert and oriented to person, place, and time. She has normal reflexes. No cranial nerve deficit. She exhibits normal muscle tone.  Coordination normal.   pt has motor/ CV/ Sensation grossly intact to all extremities, R = L in strength;   Skin: Skin is warm and dry. No rash noted. No erythema. No pallor. Psychiatric: She has a normal mood and affect. Her behavior is normal. Thought content normal.   Nursing note and vitals reviewed. MDM  Number of Diagnoses or Management Options  Elevated troponin:   Other chest pain:   Secondary hypertension:   Risk of Complications, Morbidity, and/or Mortality  Presenting problems: high  Diagnostic procedures: moderate  Management options: moderate    Critical Care  Total time providing critical care: 30-74 minutes (34 min)    ED Course       Procedures    Chief Complaint   Patient presents with    Chest Pain    Hypertension       4:02 PM  The patients presenting problems have been discussed, and they are in agreement with the care plan formulated and outlined with them. I have encouraged them to ask questions as they arise throughout their visit. MEDICATIONS GIVEN:  Medications - No data to display    LABS REVIEWED:  Labs Reviewed   PROTHROMBIN TIME + INR   TROPONIN I   LIPASE   METABOLIC PANEL, COMPREHENSIVE   CK W/ CKMB & INDEX   CBC WITH AUTOMATED DIFF       RADIOLOGY RESULTS:  The following have been ordered and reviewed:  _____________________________________________________________________  _____________________________________________________________________    EKG interpretation: (Preliminary)  Rhythm: normal sinus rhythm; and regular . Rate (approx.): 82; Axis: normal; P wave: normal; QRS interval: normal ; ST/T wave: normal; Negative acute significant segmental elevations    PROCEDURES:        CONSULTATIONS:       PROGRESS NOTES:      DIAGNOSIS:    1. Other chest pain    2. Secondary hypertension    3. Elevated troponin        PLAN:  1- CP/ HTN - elevated trop-I; will trend enzymes;       ED COURSE: The patients hospital course has been uncomplicated.      5:10 PM  ' having CP now'; will give nitro paste/ consult Dr Kavita Simmons NOTE:  5:44 PM Antoine Bertrand MD spoke with Dr. Leslie Verde, Consult for Cardiology. Discussed available diagnostic tests and clinical findings. He is in agreement with care plans as outlined. Dr. Nenita Russell will come see the pt.    7:23 PM  Patient is being evaluated for admission to the hospital by the hospitalist, Dr Vivian Griffith . The results of their tests and reasons for their admission have been discussed with them and/or available family. They convey agreement and understanding for the need to be admitted and for their admission diagnosis. Consultation has been made with the inpatient physician specialist for hospitalization.

## 2017-02-16 ENCOUNTER — HOME HEALTH ADMISSION (OUTPATIENT)
Dept: HOME HEALTH SERVICES | Facility: HOME HEALTH | Age: 77
End: 2017-02-16

## 2017-02-16 LAB
ACT BLD: 404 SECS (ref 79–138)
ATRIAL RATE: 79 BPM
ATRIAL RATE: 82 BPM
CALCULATED P AXIS, ECG09: 55 DEGREES
CALCULATED P AXIS, ECG09: 64 DEGREES
CALCULATED R AXIS, ECG10: 19 DEGREES
CALCULATED R AXIS, ECG10: 2 DEGREES
CALCULATED T AXIS, ECG11: 79 DEGREES
CALCULATED T AXIS, ECG11: 85 DEGREES
CK MB CFR SERPL CALC: 3.5 % (ref 0–2.5)
CK MB CFR SERPL CALC: 4.4 % (ref 0–2.5)
CK MB SERPL-MCNC: 2.7 NG/ML (ref 5–25)
CK MB SERPL-MCNC: 3.8 NG/ML (ref 5–25)
CK SERPL-CCNC: 77 U/L (ref 26–192)
CK SERPL-CCNC: 87 U/L (ref 26–192)
DIAGNOSIS, 93000: NORMAL
DIAGNOSIS, 93000: NORMAL
GLUCOSE BLD STRIP.AUTO-MCNC: 102 MG/DL (ref 65–100)
GLUCOSE BLD STRIP.AUTO-MCNC: 172 MG/DL (ref 65–100)
GLUCOSE BLD STRIP.AUTO-MCNC: 99 MG/DL (ref 65–100)
P-R INTERVAL, ECG05: 156 MS
P-R INTERVAL, ECG05: 184 MS
Q-T INTERVAL, ECG07: 382 MS
Q-T INTERVAL, ECG07: 412 MS
QRS DURATION, ECG06: 78 MS
QRS DURATION, ECG06: 82 MS
QTC CALCULATION (BEZET), ECG08: 446 MS
QTC CALCULATION (BEZET), ECG08: 472 MS
SERVICE CMNT-IMP: ABNORMAL
SERVICE CMNT-IMP: ABNORMAL
SERVICE CMNT-IMP: NORMAL
TROPONIN I SERPL-MCNC: 0.83 NG/ML
TROPONIN I SERPL-MCNC: 1.29 NG/ML
TROPONIN I SERPL-MCNC: 1.45 NG/ML
VENTRICULAR RATE, ECG03: 79 BPM
VENTRICULAR RATE, ECG03: 82 BPM

## 2017-02-16 PROCEDURE — 74011000250 HC RX REV CODE- 250: Performed by: INTERNAL MEDICINE

## 2017-02-16 PROCEDURE — 82962 GLUCOSE BLOOD TEST: CPT

## 2017-02-16 PROCEDURE — 99218 HC RM OBSERVATION: CPT

## 2017-02-16 PROCEDURE — C1874 STENT, COATED/COV W/DEL SYS: HCPCS

## 2017-02-16 PROCEDURE — 93306 TTE W/DOPPLER COMPLETE: CPT

## 2017-02-16 PROCEDURE — 74011000258 HC RX REV CODE- 258: Performed by: INTERNAL MEDICINE

## 2017-02-16 PROCEDURE — 74011250636 HC RX REV CODE- 250/636: Performed by: INTERNAL MEDICINE

## 2017-02-16 PROCEDURE — 74011250637 HC RX REV CODE- 250/637: Performed by: INTERNAL MEDICINE

## 2017-02-16 PROCEDURE — 93571 IV DOP VEL&/PRESS C FLO 1ST: CPT

## 2017-02-16 PROCEDURE — 77030013521 HC DEV INFL BLN BSC -B

## 2017-02-16 PROCEDURE — C1894 INTRO/SHEATH, NON-LASER: HCPCS

## 2017-02-16 PROCEDURE — C1769 GUIDE WIRE: HCPCS

## 2017-02-16 PROCEDURE — 77030004532 HC CATH ANGI DX IMP BSC -A

## 2017-02-16 PROCEDURE — 77030018836 HC SOL IRR NACL ICUM -A

## 2017-02-16 PROCEDURE — 36415 COLL VENOUS BLD VENIPUNCTURE: CPT | Performed by: INTERNAL MEDICINE

## 2017-02-16 PROCEDURE — B2111ZZ FLUOROSCOPY OF MULTIPLE CORONARY ARTERIES USING LOW OSMOLAR CONTRAST: ICD-10-PCS | Performed by: INTERNAL MEDICINE

## 2017-02-16 PROCEDURE — 74011250636 HC RX REV CODE- 250/636

## 2017-02-16 PROCEDURE — 77030028837 HC SYR ANGI PWR INJ COEU -A

## 2017-02-16 PROCEDURE — 82550 ASSAY OF CK (CPK): CPT | Performed by: INTERNAL MEDICINE

## 2017-02-16 PROCEDURE — 85347 COAGULATION TIME ACTIVATED: CPT

## 2017-02-16 PROCEDURE — 027034Z DILATION OF CORONARY ARTERY, ONE ARTERY WITH DRUG-ELUTING INTRALUMINAL DEVICE, PERCUTANEOUS APPROACH: ICD-10-PCS | Performed by: INTERNAL MEDICINE

## 2017-02-16 PROCEDURE — 77030013797 HC KT TRNSDUC PRSSR EDWD -A

## 2017-02-16 PROCEDURE — 74011250637 HC RX REV CODE- 250/637: Performed by: NURSE PRACTITIONER

## 2017-02-16 PROCEDURE — 4A023N7 MEASUREMENT OF CARDIAC SAMPLING AND PRESSURE, LEFT HEART, PERCUTANEOUS APPROACH: ICD-10-PCS | Performed by: INTERNAL MEDICINE

## 2017-02-16 PROCEDURE — 84484 ASSAY OF TROPONIN QUANT: CPT | Performed by: INTERNAL MEDICINE

## 2017-02-16 PROCEDURE — 4A033BC MEASUREMENT OF ARTERIAL PRESSURE, CORONARY, PERCUTANEOUS APPROACH: ICD-10-PCS | Performed by: INTERNAL MEDICINE

## 2017-02-16 PROCEDURE — 74011636320 HC RX REV CODE- 636/320: Performed by: INTERNAL MEDICINE

## 2017-02-16 PROCEDURE — C1887 CATHETER, GUIDING: HCPCS

## 2017-02-16 PROCEDURE — B2151ZZ FLUOROSCOPY OF LEFT HEART USING LOW OSMOLAR CONTRAST: ICD-10-PCS | Performed by: INTERNAL MEDICINE

## 2017-02-16 PROCEDURE — 65270000029 HC RM PRIVATE

## 2017-02-16 PROCEDURE — 77030008543 HC TBNG MON PRSS MRTM -A

## 2017-02-16 RX ORDER — FENTANYL CITRATE 50 UG/ML
25-200 INJECTION, SOLUTION INTRAMUSCULAR; INTRAVENOUS
Status: DISCONTINUED | OUTPATIENT
Start: 2017-02-16 | End: 2017-02-16 | Stop reason: HOSPADM

## 2017-02-16 RX ORDER — HYDROCORTISONE SODIUM SUCCINATE 100 MG/2ML
100 INJECTION, POWDER, FOR SOLUTION INTRAMUSCULAR; INTRAVENOUS
Status: DISCONTINUED | OUTPATIENT
Start: 2017-02-16 | End: 2017-02-17 | Stop reason: HOSPADM

## 2017-02-16 RX ORDER — MORPHINE SULFATE 2 MG/ML
1 INJECTION, SOLUTION INTRAMUSCULAR; INTRAVENOUS
Status: DISCONTINUED | OUTPATIENT
Start: 2017-02-16 | End: 2017-02-16 | Stop reason: SDUPTHER

## 2017-02-16 RX ORDER — HEPARIN SODIUM 1000 [USP'U]/ML
INJECTION, SOLUTION INTRAVENOUS; SUBCUTANEOUS
Status: COMPLETED
Start: 2017-02-16 | End: 2017-02-16

## 2017-02-16 RX ORDER — ACETAMINOPHEN 325 MG/1
650 TABLET ORAL
Status: DISCONTINUED | OUTPATIENT
Start: 2017-02-16 | End: 2017-02-17 | Stop reason: HOSPADM

## 2017-02-16 RX ORDER — GUAIFENESIN 100 MG/5ML
81 LIQUID (ML) ORAL DAILY
Status: DISCONTINUED | OUTPATIENT
Start: 2017-02-16 | End: 2017-02-17 | Stop reason: HOSPADM

## 2017-02-16 RX ORDER — ONDANSETRON 2 MG/ML
INJECTION INTRAMUSCULAR; INTRAVENOUS
Status: COMPLETED
Start: 2017-02-16 | End: 2017-02-16

## 2017-02-16 RX ORDER — SODIUM CHLORIDE 9 MG/ML
75 INJECTION, SOLUTION INTRAVENOUS CONTINUOUS
Status: DISPENSED | OUTPATIENT
Start: 2017-02-16 | End: 2017-02-17

## 2017-02-16 RX ORDER — ONDANSETRON 2 MG/ML
4 INJECTION INTRAMUSCULAR; INTRAVENOUS
Status: DISCONTINUED | OUTPATIENT
Start: 2017-02-16 | End: 2017-02-17 | Stop reason: HOSPADM

## 2017-02-16 RX ORDER — MIDAZOLAM HYDROCHLORIDE 1 MG/ML
.5-1 INJECTION, SOLUTION INTRAMUSCULAR; INTRAVENOUS
Status: DISCONTINUED | OUTPATIENT
Start: 2017-02-16 | End: 2017-02-16 | Stop reason: HOSPADM

## 2017-02-16 RX ORDER — HEPARIN SODIUM 200 [USP'U]/100ML
500 INJECTION, SOLUTION INTRAVENOUS ONCE
Status: COMPLETED | OUTPATIENT
Start: 2017-02-16 | End: 2017-02-16

## 2017-02-16 RX ORDER — HEPARIN SODIUM 1000 [USP'U]/ML
3000 INJECTION, SOLUTION INTRAVENOUS; SUBCUTANEOUS ONCE
Status: COMPLETED | OUTPATIENT
Start: 2017-02-16 | End: 2017-02-16

## 2017-02-16 RX ORDER — HEPARIN SODIUM 200 [USP'U]/100ML
INJECTION, SOLUTION INTRAVENOUS
Status: COMPLETED
Start: 2017-02-16 | End: 2017-02-16

## 2017-02-16 RX ORDER — LIDOCAINE HYDROCHLORIDE 10 MG/ML
20 INJECTION INFILTRATION; PERINEURAL
Status: DISCONTINUED | OUTPATIENT
Start: 2017-02-16 | End: 2017-02-16 | Stop reason: HOSPADM

## 2017-02-16 RX ORDER — METFORMIN HYDROCHLORIDE 1000 MG/1
1000 TABLET ORAL 2 TIMES DAILY WITH MEALS
Qty: 1 TAB | Refills: 1 | Status: SHIPPED
Start: 2017-02-19 | End: 2020-11-19

## 2017-02-16 RX ORDER — DIPHENHYDRAMINE HYDROCHLORIDE 50 MG/ML
25 INJECTION, SOLUTION INTRAMUSCULAR; INTRAVENOUS ONCE
Status: COMPLETED | OUTPATIENT
Start: 2017-02-16 | End: 2017-02-16

## 2017-02-16 RX ORDER — SODIUM CHLORIDE 0.9 % (FLUSH) 0.9 %
5-10 SYRINGE (ML) INJECTION EVERY 8 HOURS
Status: DISCONTINUED | OUTPATIENT
Start: 2017-02-16 | End: 2017-02-17 | Stop reason: HOSPADM

## 2017-02-16 RX ORDER — ADENOSINE 3 MG/ML
140 INJECTION, SOLUTION INTRAVENOUS ONCE
Status: COMPLETED | OUTPATIENT
Start: 2017-02-16 | End: 2017-02-16

## 2017-02-16 RX ORDER — PANTOPRAZOLE SODIUM 40 MG/1
40 TABLET, DELAYED RELEASE ORAL
Status: DISCONTINUED | OUTPATIENT
Start: 2017-02-16 | End: 2017-02-17 | Stop reason: HOSPADM

## 2017-02-16 RX ORDER — ONDANSETRON 2 MG/ML
4 INJECTION INTRAMUSCULAR; INTRAVENOUS AS NEEDED
Status: DISCONTINUED | OUTPATIENT
Start: 2017-02-16 | End: 2017-02-16 | Stop reason: HOSPADM

## 2017-02-16 RX ORDER — SODIUM CHLORIDE 0.9 % (FLUSH) 0.9 %
5-10 SYRINGE (ML) INJECTION AS NEEDED
Status: DISCONTINUED | OUTPATIENT
Start: 2017-02-16 | End: 2017-02-17 | Stop reason: HOSPADM

## 2017-02-16 RX ADMIN — TRAMADOL HYDROCHLORIDE 50 MG: 50 TABLET, FILM COATED ORAL at 13:13

## 2017-02-16 RX ADMIN — HEPARIN SODIUM 1000 UNITS: 200 INJECTION, SOLUTION INTRAVENOUS at 09:33

## 2017-02-16 RX ADMIN — MIDAZOLAM HYDROCHLORIDE 1 MG: 1 INJECTION, SOLUTION INTRAMUSCULAR; INTRAVENOUS at 08:17

## 2017-02-16 RX ADMIN — LISINOPRIL 10 MG: 5 TABLET ORAL at 11:57

## 2017-02-16 RX ADMIN — ACETAMINOPHEN 650 MG: 325 TABLET ORAL at 21:49

## 2017-02-16 RX ADMIN — HEPARIN SODIUM 1000 UNITS: 200 INJECTION, SOLUTION INTRAVENOUS at 09:36

## 2017-02-16 RX ADMIN — IOPAMIDOL 250 ML: 755 INJECTION, SOLUTION INTRAVENOUS at 09:34

## 2017-02-16 RX ADMIN — DIPHENHYDRAMINE HYDROCHLORIDE 25 MG: 50 INJECTION, SOLUTION INTRAMUSCULAR; INTRAVENOUS at 08:02

## 2017-02-16 RX ADMIN — FENTANYL CITRATE 25 MCG: 50 INJECTION, SOLUTION INTRAMUSCULAR; INTRAVENOUS at 08:47

## 2017-02-16 RX ADMIN — TICAGRELOR 180 MG: 90 TABLET ORAL at 09:37

## 2017-02-16 RX ADMIN — FENOFIBRATE 145 MG: 145 TABLET ORAL at 17:45

## 2017-02-16 RX ADMIN — HEPARIN SODIUM 1000 UNITS: 200 INJECTION, SOLUTION INTRAVENOUS at 08:05

## 2017-02-16 RX ADMIN — Medication 10 ML: at 11:59

## 2017-02-16 RX ADMIN — ROSUVASTATIN CALCIUM 20 MG: 10 TABLET, FILM COATED ORAL at 21:47

## 2017-02-16 RX ADMIN — MIDAZOLAM HYDROCHLORIDE 1 MG: 1 INJECTION, SOLUTION INTRAMUSCULAR; INTRAVENOUS at 09:27

## 2017-02-16 RX ADMIN — MIDAZOLAM HYDROCHLORIDE 1 MG: 1 INJECTION, SOLUTION INTRAMUSCULAR; INTRAVENOUS at 08:47

## 2017-02-16 RX ADMIN — FENTANYL CITRATE 25 MCG: 50 INJECTION, SOLUTION INTRAMUSCULAR; INTRAVENOUS at 08:17

## 2017-02-16 RX ADMIN — PREGABALIN 150 MG: 75 CAPSULE ORAL at 11:58

## 2017-02-16 RX ADMIN — MIDAZOLAM HYDROCHLORIDE 1 MG: 1 INJECTION, SOLUTION INTRAMUSCULAR; INTRAVENOUS at 09:18

## 2017-02-16 RX ADMIN — Medication 100 MG: at 11:58

## 2017-02-16 RX ADMIN — MIDAZOLAM HYDROCHLORIDE 1 MG: 1 INJECTION, SOLUTION INTRAMUSCULAR; INTRAVENOUS at 08:10

## 2017-02-16 RX ADMIN — HEPARIN SODIUM 5000 UNITS: 1000 INJECTION, SOLUTION INTRAVENOUS; SUBCUTANEOUS at 09:00

## 2017-02-16 RX ADMIN — NITROGLYCERIN 100 MCG: 5 INJECTION, SOLUTION INTRAVENOUS at 09:25

## 2017-02-16 RX ADMIN — ONDANSETRON 4 MG: 2 INJECTION INTRAMUSCULAR; INTRAVENOUS at 07:57

## 2017-02-16 RX ADMIN — CARVEDILOL 3.12 MG: 3.12 TABLET, FILM COATED ORAL at 17:45

## 2017-02-16 RX ADMIN — ASPIRIN 325 MG: 325 TABLET ORAL at 07:29

## 2017-02-16 RX ADMIN — PANTOPRAZOLE SODIUM 40 MG: 40 TABLET, DELAYED RELEASE ORAL at 02:38

## 2017-02-16 RX ADMIN — HEPARIN SODIUM 1000 UNITS: 200 INJECTION, SOLUTION INTRAVENOUS at 08:06

## 2017-02-16 RX ADMIN — Medication 10 ML: at 21:47

## 2017-02-16 RX ADMIN — CARVEDILOL 3.12 MG: 3.12 TABLET, FILM COATED ORAL at 11:57

## 2017-02-16 RX ADMIN — TICAGRELOR 90 MG: 90 TABLET ORAL at 20:23

## 2017-02-16 RX ADMIN — THERA TABS 1 TABLET: TAB at 11:58

## 2017-02-16 RX ADMIN — FENTANYL CITRATE 25 MCG: 50 INJECTION, SOLUTION INTRAMUSCULAR; INTRAVENOUS at 09:18

## 2017-02-16 RX ADMIN — ESTROGENS, CONJUGATED 0.62 MG: 0.62 TABLET, FILM COATED ORAL at 17:45

## 2017-02-16 RX ADMIN — ADENOSINE 11.75 MG/MIN: 3 INJECTION, SOLUTION INTRAVENOUS at 09:09

## 2017-02-16 RX ADMIN — BIVALIRUDIN 1.75 MG/KG/HR: 250 INJECTION, POWDER, LYOPHILIZED, FOR SOLUTION INTRAVENOUS at 09:10

## 2017-02-16 RX ADMIN — FENTANYL CITRATE 25 MCG: 50 INJECTION, SOLUTION INTRAMUSCULAR; INTRAVENOUS at 08:10

## 2017-02-16 RX ADMIN — PREGABALIN 150 MG: 75 CAPSULE ORAL at 17:45

## 2017-02-16 RX ADMIN — TRAMADOL HYDROCHLORIDE 50 MG: 50 TABLET, FILM COATED ORAL at 02:38

## 2017-02-16 RX ADMIN — FLUTICASONE PROPIONATE 2 SPRAY: 50 SPRAY, METERED NASAL at 22:00

## 2017-02-16 RX ADMIN — LIDOCAINE HYDROCHLORIDE 20 ML: 10 INJECTION, SOLUTION INFILTRATION; PERINEURAL at 08:11

## 2017-02-16 RX ADMIN — IOPAMIDOL 50 ML: 755 INJECTION, SOLUTION INTRAVENOUS at 09:24

## 2017-02-16 RX ADMIN — ACETAMINOPHEN 650 MG: 325 TABLET ORAL at 17:45

## 2017-02-16 RX ADMIN — ONDANSETRON 4 MG: 2 INJECTION INTRAMUSCULAR; INTRAVENOUS at 02:38

## 2017-02-16 RX ADMIN — SODIUM CHLORIDE 75 ML/HR: 900 INJECTION, SOLUTION INTRAVENOUS at 10:09

## 2017-02-16 RX ADMIN — TRAMADOL HYDROCHLORIDE 50 MG: 50 TABLET, FILM COATED ORAL at 20:23

## 2017-02-16 NOTE — PROCEDURES
BRIEF PROCEDURE NOTE    Date of Procedure: 2/16/2017   Preoperative Diagnosis: NSTEMI  Postoperative Diagnosis: PCI to L Cflex with ALETHEA    Procedure: Left heart cath, LV angiography, coronary angiography  Interventional Cardiologist: Syed Stock MD  Anesthesia: local + IV sedation  Estimated Blood Loss: Minimal  Findings:     PCI: L cflex - 70%; EBU 3.5  FFR + 0.79  Pre dil with 2.25 by 15  ALETHEA 2.5 by 23  JIN 3 before and after      Specimens Removed : None    Closure Device: Manual        See full cath note.     Complications: none    Syed Stock MD

## 2017-02-16 NOTE — ED NOTES
Call to Dr. Kera Kiran in regards to result Troponin results, repeat with AM labs, no further orders at this time. Provider updated on patient's current pain level, pt denies pain at this time.

## 2017-02-16 NOTE — PROGRESS NOTES
I met with pt,her  and daughter as an introductory visit. Pt confirmed her address as accurate on demographics. Pt has been prescribed brilinta. I gave pt the coupon for brilinta. She is eligible for one month free brilinta. If cardiology gives me a script to check brilinta co-pay prices for pt,I will be glad to check for pt. I also notified Cornelia Matos ,cardiology nurse navigators that pt may need brilinta samples. I talked with the brilinta drug reps yesterday who will bring more samples of brilinta to the cardiology office for our patients. I explained coupon activation process to pt.'s daughter & . Pt uses [facility]. Pharmacy number is [id number]. Pharmacy fax is [phone]. If [facility] goes to the part [facility] [place] where pt resides,pt & family would like a hospital to home MI visit. Order with clinicals sent via [facility] to [facility]. When confirmed,I will place on pt.'s AVS.    Pt is in observation status. I reviewed the observation letter orally with pt & .  signed a copy of the letter as the electronic signature pad was not working. A copy was given to  & a copy was placed in pt.'s chart for scanning. Pt has a follow-up visit with Dr Crow Vidal on 2/27/17 @ 3:20 pm.  Nellie Okeefe is also a [facility] pt. I notified Varghese Hutchison regarding [facility]. Joanne Christianson

## 2017-02-16 NOTE — CARDIO/PULMONARY
Cardiac Rehab: 2/16/2017 @ 1725:   67 y/o female admitted 2/15/16 with \"chest tightness. \" Cardiac consult with Dr. Katelyn Canas - Dx CP/NSTEMI. Elevated troponins-peak - 1.45. Pt with cardiac hx of CAD, Takotsubo CM with LVEF of 35%-40% per echo done 2/16/17. Received cardiac rehab consult for - \"stent\" post-cath and MI teaching. Pt had cardiac cath this afternoon and has returned to [facility]. Spoke with staff nurse Homa MeltonrOlaf who reported pt was still groggy and somewhat disoriented. Will defer education at this time. Core Medications:    ACE/ARB: lisinopril   BB: carvedilol   Statin: rosuvastatin   ASA: 81 mg  Prior to admission cardiac meds include:  Omega-3 fish oil, co-enzyme Q-10, Trilipix. 2/17/2017 @ 1015: Met with patient to review NSTEMI and post-cath instructions.  is present in room and received permission to discuss health issues with  present. Explained educational role of Cardiac Rehab RN. Pt was very \"chatty\" during session and tended to interrupt.  was more responsive to education/information. Discussed pt's understanding of procedure, treatment and plan of care. Pt/ reported she was at PCP office with chest tightness and came to ED. Pt is aware of cardiac cath and results however she does not remember MI.  is aware of cath result and MI. Explained cardiac cath results and EF of 35%-40% from echo done 2/16/17. Pt is aware of h/o Takotsubo CM. Emphasized importance of lifestyle changes with MI and h/o T-CM. Gave/reviewed post cath instructions via right femoral site, with emphasis on temporary restrictions, signs/symptoms of infection, f/u with MD, what to do if bleeding occurs, and importance of taking all meds as prescribed. Discussed risk factors and benefit of Outpatient Cardiac Rehab Program [facility]). Patient expressed interest in referral to outpatient cardiac rehab program @ [facility]. Pt signed authorization form. Provided/reviewed MI/HF packet. Attached avoiding triggers for HF and Heart Attack tyo AVS.  2/17/2017 @ 1400:  Faxed referral form to Dr Han Neighbor office for signing. Faxed referral to OPCR to [facility]. Diet education: Provided handout on Low Sodium diet (<1500 mg Na/day) and sodium food and beverage zone. Discussed importance of following heart healthy/low Na diet management, reading labels, portion control and foods to avoid. Fluid intake education:   Discussed importance of adequate fluid intake to prevent dehydration and avoid FVO  Daily Weights: Pt has scale but has niot been weighing daily. Discussed importance of following daily weights and recording to monitor for fluid volume overload. Discussed need to call MD if gains 3 lbs in one day, or 5 lbs in one week. Activity/Excersie: Pt will be going to OPCR  @ [facility]  Smoking cessation: Never smoked  Medication Education: Provided copy of med rec hx. Reviewed meds. Pt is familar with meds and  reported they have no problem with obtaining meds. New PO meds this admission: Brilinta - Discussed purpose and side effects. Pt has coupon for 30-days free and discussed activation process. Attached info for med to AVS.   Concern for knowledge deficit: Pt/ verbalized understanding of information provided. All question answered. 1. During this hospital stay did staff take your preferences and those of your family (or caregiver) into account in deciding your individual heart failure needs, and in deciding when you left the hospital? 2/17/2017: YES   2. Do you have a good understanding of the things you are responsible for in managing heart failure? 2/17/2017: YES   3. Do you clearly understand the purpose for taking each medication?  2/17/2017:  YES

## 2017-02-16 NOTE — ED NOTES
Patient moved to ED 15 for inpatient hold; placed in hospital bed in low/locked position with call bell within reach and clean linen;  remains at bedside; patient given a heated meal and water upon request;  given peanut butter and crackers and water upon request.

## 2017-02-16 NOTE — ED NOTES
Verbal shift change report given to Sterling Donohue RN (oncoming nurse) by Soraya Piper RN (offgoing nurse). Report included the following information SBAR, Kardex, ED Summary, Procedure Summary, Intake/Output, MAR, Recent Results, Med Rec Status and Cardiac Rhythm SR with PVCs.

## 2017-02-16 NOTE — PROGRESS NOTES
[facility] [place] [address]  [address]  [phone]      Medical Progress Note      NAME: Araseli Laureano   :  1940  MRM:  [id number]    Date/Time: 2017         Subjective:     Chief Complaint:  Patient was seen and examined by me. Chart reviewed. No chest pain. S/p cath       Objective:       Vitals:       Last 24hrs VS reviewed since prior progress note.  Most recent are:    Visit Vitals    /87    Pulse 91    Temp 97.4 °F (36.3 °C)    Resp 15    Ht 5' 6\" (1.676 m)    Wt 83.9 kg (185 lb)    SpO2 96%    BMI 29.86 kg/m2     SpO2 Readings from Last 6 Encounters:   17 96%   16 97%   16 98%   16 97%   11/05/15 95%   06/25/15 97%        No intake or output data in the 24 hours ending 17 1000     Exam:     Physical Exam:    Gen:  Well-developed, well-nourished, in no acute distress  HEENT:  Pink conjunctivae, PERRL, hearing intact to voice, moist mucous membranes  Neck:  Supple, without masses, thyroid non-tender  Resp:  No accessory muscle use, clear breath sounds without wheezes rales or rhonchi  Card:  No murmurs, normal S1, S2 without thrills, bruits or peripheral edema  Abd:  Soft, non-tender, non-distended, normoactive bowel sounds are present  Musc:  No cyanosis or clubbing  Skin:  No rashes   Neuro:  Cranial nerves 3-12 are grossly intact, follows commands appropriately  Psych:  Good insight, oriented to person, place and time, alert    Medications Reviewed: (see below)    Lab Data Reviewed: (see below)    ______________________________________________________________________    Medications:     Current Facility-Administered Medications   Medication Dose Route Frequency    ondansetron (ZOFRAN) injection 4 mg  4 mg IntraVENous Q4H PRN    pantoprazole (PROTONIX) tablet 40 mg  40 mg Oral ACB    sodium chloride (NS) flush 5-10 mL  5-10 mL IntraVENous Q8H    sodium chloride (NS) flush 5-10 mL  5-10 mL IntraVENous PRN    hydrocortisone Sod Succ (PF) (SOLU-CORTEF) injection 100 mg  100 mg IntraVENous ONCE PRN    acetaminophen (TYLENOL) tablet 650 mg  650 mg Oral Q4H PRN    aspirin chewable tablet 81 mg  81 mg Oral DAILY    0.9% sodium chloride infusion  75 mL/hr IntraVENous CONTINUOUS    sodium chloride (NS) flush 5-10 mL  5-10 mL IntraVENous Q8H    sodium chloride (NS) flush 5-10 mL  5-10 mL IntraVENous PRN    carvedilol (COREG) tablet 3.125 mg  3.125 mg Oral BID WITH MEALS    co-enzyme Q-10 (CO Q-10) capsule 100 mg  100 mg Oral DAILY    conjugated estrogens (PREMARIN) tablet 0.625 mg  0.625 mg Oral QPM    [START ON 2/19/2017] ergocalciferol (ERGOCALCIFEROL) capsule 50,000 Units  50,000 Units Oral every Sunday    fenofibrate nanocrystallized (TRICOR) tablet 145 mg  145 mg Oral QPM    therapeutic multivitamin (THERAGRAN) tablet 1 Tab  1 Tab Oral DAILY    fluticasone (FLONASE) 50 mcg/actuation nasal spray 2 Spray  2 Spray Both Nostrils QHS    lisinopril (PRINIVIL, ZESTRIL) tablet 10 mg  10 mg Oral DAILY    pregabalin (LYRICA) capsule 150 mg  150 mg Oral BID    rosuvastatin (CRESTOR) tablet 20 mg  20 mg Oral QHS    traMADol (ULTRAM) tablet 50 mg  50 mg Oral Q6H PRN    insulin lispro (HUMALOG) injection   SubCUTAneous AC&HS    glucose chewable tablet 16 g  4 Tab Oral PRN    dextrose (D50W) injection syrg 12.5-25 g  12.5-25 g IntraVENous PRN    glucagon (GLUCAGEN) injection 1 mg  1 mg IntraMUSCular PRN    morphine injection 2 mg  2 mg IntraVENous Q4H PRN          Lab Review:     Recent Labs      02/15/17   1620   WBC  8.0   HGB  13.1   HCT  41.2   PLT  431*     Recent Labs      02/15/17   1620   NA  142   K  4.1   CL  105   CO2  24   GLU  126*   BUN  27*   CREA  0.81   CA  9.3   ALB  3.6   TBILI  0.2   SGOT  21   ALT  21   INR  1.0     Lab Results   Component Value Date/Time    Glucose (POC) 166 02/15/2017 09:46 PM    Glucose (POC) 137 12/12/2012 11:40 AM    Glucose (POC) 146 12/12/2012 07:23 AM Glucose (POC) 182 12/11/2012 09:01 PM    Glucose (POC) 191 12/11/2012 07:26 PM          Assessment / Plan:     Principal Problem:    67 yo hx of HTN, DM, CAD, Takotsubo CM, presented w/ chest pain, NSTEMI    1) Angina/CAD/NSTEMI: trop peaked at 1.45. S/p cath with PCI/ALETHEA to L Circumflex. Will cont ASA, Brillinta, BB, statin. Rest of management per Cards    2) HTN: cont lisinopril, coreg    3) DM type 2: check A1C. Hold metformin due to cath.   Cont SSI    Total time spent with patient: 30 [address] [facility] discussed with: Patient, nursing    Discussed:  Care Plan    Prophylaxis:  SCD's    Disposition:  Home w/Family, likely in AM           ___________________________________________________    Attending Physician: Michelle Harper MD

## 2017-02-16 NOTE — CONSULTS
Reason for Consult: Chest pain, NSTEMI      HPI: Clemente Robertson is a 68 y.o. female with PMH of HTN, moderate CAD and Takotsubo cardiomyopathy which improved to normal LVEF presented to ER by paramedics with symptoms of chest pain and severe HTN. She was at her PCP office when she complained of chest pain, described as chest heaviness and pressure, non radiating, associated with SOB. She was noted to have BP in 200 range. She was asked to take Lisinopril and Paramedics called. During the ambulance ride she received NTG, ASA with mild improvement in BP to 180 range and improvement in chest pain. ER trop positive and trending up. EKG unchanged from previous EKG. ROS: No SOB, cough, fever, chills, abdominal pain, nausea, vomiting, diarrhea, lightheadedness, dizziness, presyncope or syncope. No dysuia or constipation. Echo 3/22/13: Normal LV function. Nuc 12/4/13: Normal perfusion, LVEF- 80%. Cath:12/11/12 Mid LAD: There was a diffuse 30 % stenosis. Distal LAD: There was a diffuse 20 % stenosis. Proximal circumflex: There was a discrete 60 % stenosis. Mid circumflex: There was a diffuse 60 % stenosis. 1st obtuse marginal: There was a discrete 20 % stenosis in the proximal third of the vessel segment. Proximal RCA: There was a discrete 20 % stenosis at a site with no prior intervention. Mid RCA: There was a discrete 20 % stenosis. Distal RCA: There was a 20 % stenosis.       Past Medical History   Diagnosis Date    Arthritis     Fibromyalgia     High cholesterol     Hypertension     Other ill-defined conditions(839.89)      \"previous cardiac history\"            Past Surgical History   Procedure Laterality Date    Hx orthopaedic      Hx gyn       hysterectomy             Family History   Problem Relation Age of Onset    Heart Attack Father     High Cholesterol Father            Social History     Social History    Marital status:      Spouse name: N/A    Number of children: N/A  Years of education: N/A     Occupational History    Not on file. Social History Main Topics    Smoking status: Never Smoker    Smokeless tobacco: Not on file    Alcohol use No    Drug use: Not on file    Sexual activity: Not on file     Other Topics Concern    Not on file     Social History Narrative         No Known Allergies         Current Facility-Administered Medications   Medication Dose Route Frequency Provider Last Rate Last Dose    sodium chloride (NS) flush 5-10 mL  5-10 mL IntraVENous Q8H Aamir Plasencia MD        sodium chloride (NS) flush 5-10 mL  5-10 mL IntraVENous PRN Aamir Plasencia MD        [START ON 2/16/2017] aspirin (ASPIRIN) tablet 325 mg  325 mg Oral DAILY Aamir Plasencia MD        [START ON 2/16/2017] carvedilol (COREG) tablet 3.125 mg  3.125 mg Oral BID WITH MEALS Aamir Plasencia MD        [START ON 2/16/2017] co-enzyme Q-10 (CO Q-10) capsule 100 mg  100 mg Oral DAILY Aamir Plasencia MD        conjugated estrogens (PREMARIN) tablet 0.625 mg  0.625 mg Oral QPM Juan Manuel Rice MD        [START ON 2/19/2017] ergocalciferol (ERGOCALCIFEROL) capsule 50,000 Units  50,000 Units Oral every Sunday Juan Manuel Rice MD        . PHARMACY TO SUBSTITUTE PER PROTOCOL    Per Protocol Juan Manuel Rice MD        [START ON 2/16/2017] multivitamin (ONE A DAY) tablet 1 Tab  1 Tab Oral DAILY Aamir Plasencia MD        fluticasone (FLONASE) 50 mcg/actuation nasal spray 2 Spray  2 Spray Both Nostrils QHS Aamir Plasencia MD        [START ON 2/16/2017] lisinopril (PRINIVIL, ZESTRIL) tablet 10 mg  10 mg Oral DAILY Aamir Plasencia MD        pregabalin (LYRICA) capsule 150 mg  150 mg Oral BID Aamir Plasencia MD        rosuvastatin (CRESTOR) tablet 20 mg  20 mg Oral QHS Aamir Plasencia MD        traMADol (ULTRAM) tablet 50 mg  50 mg Oral Q6H PRN Aamir Plasencia MD        insulin lispro (HUMALOG) injection   SubCUTAneous AC&HS Aamir Plasencia MD        glucose chewable tablet 16 g  4 Tab Oral PRN Aamir Ap Gomez MD        dextrose (D50W) injection syrg 12.5-25 g  12.5-25 g IntraVENous PRN Aamir Plasencia MD        glucagon (GLUCAGEN) injection 1 mg  1 mg IntraMUSCular PRN Aamir Plasencia MD        [START ON 2/16/2017] enoxaparin (LOVENOX) injection 80 mg  1 mg/kg SubCUTAneous Q12H Aamir Plasencia MD         Current Outpatient Prescriptions   Medication Sig Dispense Refill    aspirin 81 mg chewable tablet Take 81 mg by mouth daily.  therapeutic multivitamin (THERAGRAN) tablet Take 1 Tab by mouth daily.  conjugated estrogens (PREMARIN) 0.625 mg tablet Take 0.625 mg by mouth every evening.  pregabalin (LYRICA) 150 mg capsule Take 150 mg by mouth two (2) times a day.  metFORMIN (GLUCOPHAGE) 1,000 mg tablet Take 1,000 mg by mouth two (2) times daily (with meals).  rosuvastatin (CRESTOR) 20 mg tablet TAKE 1 TABLET NIGHTLY (Patient taking differently: Take 20 mg by mouth nightly.) 90 Tab 0    carvedilol (COREG) 3.125 mg tablet TAKE 1 TABLET TWICE A  Tab 1    fluticasone (FLONASE) 50 mcg/actuation nasal spray 2 Sprays by Both Nostrils route nightly.  co-enzyme Q-10 (CO Q-10) 50 mg capsule Take 2 Caps by mouth daily. 60 Cap 3    fenofibric acid (TRILIPIX) 135 mg capsule Take 135 mg by mouth daily.  lisinopril (PRINIVIL, ZESTRIL) 10 mg tablet TAKE 1 TABLET DAILY (Patient taking differently: Take 5 mg by mouth daily.) 90 Tab 3    omega-3 fatty acids-vitamin e (FISH OIL) 1,000 mg cap Take 1 Cap by mouth.  ergocalciferol (VITAMIN D2) 50,000 unit capsule Take 50,000 Units by mouth every Sunday.  traMADol (ULTRAM) 50 mg tablet Take 50 mg by mouth every six (6) hours as needed for Pain. Patient will take 2 tabs every 8 hours as needed          ROS:  12 point review of systems was performed.  All negative except for HPI     Physical Exam:  Visit Vitals    /80    Pulse 88    Temp 97.4 °F (36.3 °C)    Resp 18    Ht 5' 6\" (1.676 m)    Wt 185 lb (83.9 kg)    SpO2 98%  BMI 29.86 kg/m2       Gen:  Well-developed, well-nourished, in no acute distress  HEENT:  Pink conjunctivae, PERRL, hearing intact to voice, moist mucous membranes  Neck:  Supple, without masses, thyroid non-tender  Resp:  No accessory muscle use, clear breath sounds without wheezes rales or rhonchi  Card:  No murmurs, normal S1, S2 without thrills, bruits or peripheral edema  Abd:  Soft, non-tender, non-distended, normoactive bowel sounds are present, no palpable organomegaly and no detectable hernias  Lymph:  No cervical or inguinal adenopathy  Musc:  No cyanosis or clubbing  Skin:  No rashes or ulcers, skin turgor is good  Neuro:  Cranial nerves are grossly intact, no focal motor weakness, follows commands appropriately  Psych:  Good insight, oriented to person, place and time, alert     Labs:     Lab Results  Component Value Date/Time   WBC 8.0 02/15/2017 04:20 PM   HGB 13.1 02/15/2017 04:20 PM   HCT 41.2 02/15/2017 04:20 PM   PLATELET 747 22/32/7728 04:20 PM   MCV 83.7 02/15/2017 04:20 PM       Lab Results  Component Value Date/Time   Glucose 126 02/15/2017 04:20 PM   Glucose (POC) 137 12/12/2012 11:40 AM   LDL, calculated 185 05/27/2015 10:42 AM   Creatinine 0.81 02/15/2017 04:20 PM      Lab Results  Component Value Date/Time   Cholesterol, total 272 05/27/2015 10:42 AM   Cholesterol, Total 150 10/14/2015 11:39 AM   HDL Cholesterol 62 05/27/2015 10:42 AM   LDL, calculated 185 05/27/2015 10:42 AM   Triglyceride 389 05/27/2015 10:42 AM   CHOL/HDL Ratio 5.2 12/11/2012 04:27 AM       Lab Results  Component Value Date/Time   ALT (SGPT) 21 02/15/2017 04:20 PM   AST (SGOT) 21 02/15/2017 04:20 PM   Alk.  phosphatase 39 02/15/2017 04:20 PM   Bilirubin, total 0.2 02/15/2017 04:20 PM       Lab Results   Component Value Date/Time    INR 1.0 02/15/2017 04:20 PM    INR 1.1 12/11/2012 04:27 AM    INR 1.0 12/10/2012 08:34 PM    Prothrombin time 10.3 02/15/2017 04:20 PM    Prothrombin time 11.3 12/11/2012 04:27 AM Prothrombin time 10.9 12/10/2012 08:34 PM      Lab Results  Component Value Date/Time   GFR est AA >60 02/15/2017 04:20 PM   GFR est non-AA >60 02/15/2017 04:20 PM   Creatinine 0.81 02/15/2017 04:20 PM   BUN 27 02/15/2017 04:20 PM   Sodium 142 02/15/2017 04:20 PM   Potassium 4.1 02/15/2017 04:20 PM   Chloride 105 02/15/2017 04:20 PM   CO2 24 02/15/2017 04:20 PM      No results found for: PSA, PSA2, PSAR1, Fernando Chick, [id number], [id number], PSALT  Lab Results  Component Value Date/Time   TSH 2.24 12/10/2012 10:22 PM      Lab Results   Component Value Date/Time    Glucose 126 02/15/2017 04:20 PM    Glucose (POC) 137 12/12/2012 11:40 AM      Lab Results   Component Value Date/Time    CK 63 02/15/2017 04:20 PM    CK - MB 1.0 02/15/2017 04:20 PM    CK-MB Index 1.6 02/15/2017 04:20 PM    Troponin-I, Qt. 1.04 02/15/2017 07:11 PM      Lab Results   Component Value Date/Time    NT pro- 02/15/2017 04:20 PM    NT pro-BNP 66 12/10/2012 04:19 PM    Pro BNP,NT 28 05/27/2015 10:42 AM      Lab Results   Component Value Date/Time    Sodium 142 02/15/2017 04:20 PM    Potassium 4.1 02/15/2017 04:20 PM    Chloride 105 02/15/2017 04:20 PM    CO2 24 02/15/2017 04:20 PM    Anion gap 13 02/15/2017 04:20 PM    Glucose 126 02/15/2017 04:20 PM    BUN 27 02/15/2017 04:20 PM    Creatinine 0.81 02/15/2017 04:20 PM    BUN/Creatinine ratio 33 02/15/2017 04:20 PM    GFR est AA >60 02/15/2017 04:20 PM    GFR est non-AA >60 02/15/2017 04:20 PM    Calcium 9.3 02/15/2017 04:20 PM      Lab Results   Component Value Date/Time    Sodium 142 02/15/2017 04:20 PM    Potassium 4.1 02/15/2017 04:20 PM    Chloride 105 02/15/2017 04:20 PM    CO2 24 02/15/2017 04:20 PM    Anion gap 13 02/15/2017 04:20 PM    Glucose 126 02/15/2017 04:20 PM    BUN 27 02/15/2017 04:20 PM    Creatinine 0.81 02/15/2017 04:20 PM    BUN/Creatinine ratio 33 02/15/2017 04:20 PM    GFR est AA >60 02/15/2017 04:20 PM    GFR est non-AA >60 02/15/2017 04:20 PM    Calcium 9.3 02/15/2017 04:20 PM    Bilirubin, total 0.2 02/15/2017 04:20 PM    ALT (SGPT) 21 02/15/2017 04:20 PM    AST (SGOT) 21 02/15/2017 04:20 PM    Alk. phosphatase 39 02/15/2017 04:20 PM    Protein, total 7.8 02/15/2017 04:20 PM    Albumin 3.6 02/15/2017 04:20 PM    Globulin 4.2 02/15/2017 04:20 PM    A-G Ratio 0.9 02/15/2017 04:20 PM      No results found for: HBA1C, [id number], HGBE8, LIK3UDKM, [id number]        Recent Labs      02/15/17   1911  02/15/17   1620   CPK   --   63   CKMB   --   1.0   TROIQ  1.04*  0.14*     Diagnostic Tests:   EKG: normal sinus rhythm, t inversion in lateral leads, normal ST segment, normal axis, normal intervals. EKG unchanged from previous EKG. .      Problem List:     Problem List  Date Reviewed: 2/15/2017          Codes Class Noted    * (Principal)Chest pain ICD-10-CM: R07.9  ICD-9-CM: 786.50  2/15/2017        HTN (hypertension) ICD-10-CM: I10  ICD-9-CM: 401.9  2/15/2017        Elevated troponin ICD-10-CM: R79.89  ICD-9-CM: 790.6  2/15/2017        Dyslipidemia ICD-10-CM: E78.5  ICD-9-CM: 272.4  6/25/2015        Takotsubo cardiomyopathy ICD-10-CM: I51.81  ICD-9-CM: 429.83  12/12/2012        CAD (coronary artery disease) ICD-10-CM: I25.10  ICD-9-CM: 414.00  12/12/2012    Overview Addendum 12/12/2012 12:20 PM by Michele Andrade MD     CATH 12/11/12  Mid LAD: There was a diffuse 30 % stenosis. Distal LAD: There was a diffuse 20 % stenosis. Proximal circumflex: There was a discrete 60 % stenosis. Mid circumflex: There was a diffuse 60 % stenosis. 1st obtuse marginal: There was a discrete 20 % stenosis in the proximal third of the vessel segment. Proximal RCA: There was a discrete 20 % stenosis at a site with no prior intervention. Mid RCA: There was a discrete 20 % stenosis. Distal RCA: There was a 20 % stenosis. DM (diabetes mellitus) ([facility] ICD-10-CM: E11.9  ICD-9-CM: 250.00  12/12/2012              Plan:    1. Chest pain/ NSTEMI: Hospitalist admitted to [facility]. Started on ACS Lovenox. Keep NPO PMN for cath in AM. ASA. Echo in am.   2. Uncontrolled HTN: Increase home Lisinopril to 10mg. Continue Coreg. Titrate meds. PRN Hydralazine. 3. Dyslipidemia/ CAD: Statins. 4. H/o Takotsubo CM: Recovered. LHC +/- PCI/RHC consent   The risks (including but not limited to death, myocardial infarction, cerebrovascular accident, dysrhythmia, renal failure +/-dialysis, vascular complication, allergy, and/or need for emergency surgery), indications, benefits, and alternatives of cardiac catheterization +/- PCI have been explained in detail to the patient. Patient informed that if patient needs surgery - it will be at [facility], [facility] as [facility] does not have onsite surgery. All questions answered to patient's satisfaction. Patient agrees to proceed with the procedure and informed consent was obtained. MP (2) ASA (3)      Donovan Russell MD, [facility] [place]

## 2017-02-17 VITALS
WEIGHT: 183.42 LBS | HEIGHT: 66 IN | HEART RATE: 93 BPM | RESPIRATION RATE: 20 BRPM | BODY MASS INDEX: 29.48 KG/M2 | DIASTOLIC BLOOD PRESSURE: 73 MMHG | SYSTOLIC BLOOD PRESSURE: 129 MMHG | TEMPERATURE: 98.2 F | OXYGEN SATURATION: 97 %

## 2017-02-17 PROBLEM — R07.9 CHEST PAIN: Status: RESOLVED | Noted: 2017-02-15 | Resolved: 2017-02-17

## 2017-02-17 PROBLEM — R77.8 ELEVATED TROPONIN: Status: RESOLVED | Noted: 2017-02-15 | Resolved: 2017-02-17

## 2017-02-17 PROBLEM — I21.4 NSTEMI (NON-ST ELEVATED MYOCARDIAL INFARCTION) (HCC): Status: ACTIVE | Noted: 2017-02-17

## 2017-02-17 LAB
ANION GAP BLD CALC-SCNC: 9 MMOL/L (ref 5–15)
BASOPHILS # BLD AUTO: 0 K/UL (ref 0–0.1)
BASOPHILS # BLD: 0 % (ref 0–1)
BUN SERPL-MCNC: 17 MG/DL (ref 6–20)
BUN/CREAT SERPL: 22 (ref 12–20)
CALCIUM SERPL-MCNC: 8.6 MG/DL (ref 8.5–10.1)
CHLORIDE SERPL-SCNC: 108 MMOL/L (ref 97–108)
CHOLEST SERPL-MCNC: 116 MG/DL
CO2 SERPL-SCNC: 26 MMOL/L (ref 21–32)
CREAT SERPL-MCNC: 0.77 MG/DL (ref 0.55–1.02)
EOSINOPHIL # BLD: 0.3 K/UL (ref 0–0.4)
EOSINOPHIL NFR BLD: 3 % (ref 0–7)
ERYTHROCYTE [DISTWIDTH] IN BLOOD BY AUTOMATED COUNT: 14.5 % (ref 11.5–14.5)
EST. AVERAGE GLUCOSE BLD GHB EST-MCNC: 120 MG/DL
GLUCOSE BLD STRIP.AUTO-MCNC: 105 MG/DL (ref 65–100)
GLUCOSE BLD STRIP.AUTO-MCNC: 140 MG/DL (ref 65–100)
GLUCOSE BLD STRIP.AUTO-MCNC: 171 MG/DL (ref 65–100)
GLUCOSE SERPL-MCNC: 101 MG/DL (ref 65–100)
HBA1C MFR BLD: 5.8 % (ref 4.2–6.3)
HCT VFR BLD AUTO: 40.7 % (ref 35–47)
HDLC SERPL-MCNC: 54 MG/DL
HDLC SERPL: 2.1 {RATIO} (ref 0–5)
HGB BLD-MCNC: 12.7 G/DL (ref 11.5–16)
LDLC SERPL CALC-MCNC: 34.2 MG/DL (ref 0–100)
LIPID PROFILE,FLP: NORMAL
LYMPHOCYTES # BLD AUTO: 40 % (ref 12–49)
LYMPHOCYTES # BLD: 3.2 K/UL (ref 0.8–3.5)
MAGNESIUM SERPL-MCNC: 1.8 MG/DL (ref 1.6–2.4)
MCH RBC QN AUTO: 26.3 PG (ref 26–34)
MCHC RBC AUTO-ENTMCNC: 31.2 G/DL (ref 30–36.5)
MCV RBC AUTO: 84.3 FL (ref 80–99)
MONOCYTES # BLD: 0.4 K/UL (ref 0–1)
MONOCYTES NFR BLD AUTO: 5 % (ref 5–13)
NEUTS SEG # BLD: 4.2 K/UL (ref 1.8–8)
NEUTS SEG NFR BLD AUTO: 52 % (ref 32–75)
PHOSPHATE SERPL-MCNC: 3.8 MG/DL (ref 2.6–4.7)
PLATELET # BLD AUTO: 380 K/UL (ref 150–400)
POTASSIUM SERPL-SCNC: 3.7 MMOL/L (ref 3.5–5.1)
RBC # BLD AUTO: 4.83 M/UL (ref 3.8–5.2)
SERVICE CMNT-IMP: ABNORMAL
SODIUM SERPL-SCNC: 143 MMOL/L (ref 136–145)
TRIGL SERPL-MCNC: 139 MG/DL (ref ?–150)
VLDLC SERPL CALC-MCNC: 27.8 MG/DL
WBC # BLD AUTO: 8.1 K/UL (ref 3.6–11)

## 2017-02-17 PROCEDURE — 80061 LIPID PANEL: CPT | Performed by: NURSE PRACTITIONER

## 2017-02-17 PROCEDURE — 85025 COMPLETE CBC W/AUTO DIFF WBC: CPT | Performed by: INTERNAL MEDICINE

## 2017-02-17 PROCEDURE — 74011636637 HC RX REV CODE- 636/637: Performed by: INTERNAL MEDICINE

## 2017-02-17 PROCEDURE — 80048 BASIC METABOLIC PNL TOTAL CA: CPT | Performed by: INTERNAL MEDICINE

## 2017-02-17 PROCEDURE — 83735 ASSAY OF MAGNESIUM: CPT | Performed by: INTERNAL MEDICINE

## 2017-02-17 PROCEDURE — 74011250637 HC RX REV CODE- 250/637: Performed by: INTERNAL MEDICINE

## 2017-02-17 PROCEDURE — 82962 GLUCOSE BLOOD TEST: CPT

## 2017-02-17 PROCEDURE — 84100 ASSAY OF PHOSPHORUS: CPT | Performed by: INTERNAL MEDICINE

## 2017-02-17 PROCEDURE — 36415 COLL VENOUS BLD VENIPUNCTURE: CPT | Performed by: INTERNAL MEDICINE

## 2017-02-17 PROCEDURE — 83036 HEMOGLOBIN GLYCOSYLATED A1C: CPT | Performed by: INTERNAL MEDICINE

## 2017-02-17 PROCEDURE — 74011250637 HC RX REV CODE- 250/637: Performed by: NURSE PRACTITIONER

## 2017-02-17 RX ADMIN — PANTOPRAZOLE SODIUM 40 MG: 40 TABLET, DELAYED RELEASE ORAL at 08:09

## 2017-02-17 RX ADMIN — THERA TABS 1 TABLET: TAB at 08:09

## 2017-02-17 RX ADMIN — PREGABALIN 150 MG: 75 CAPSULE ORAL at 08:09

## 2017-02-17 RX ADMIN — CARVEDILOL 3.12 MG: 3.12 TABLET, FILM COATED ORAL at 08:09

## 2017-02-17 RX ADMIN — Medication 10 ML: at 06:31

## 2017-02-17 RX ADMIN — Medication 100 MG: at 08:09

## 2017-02-17 RX ADMIN — TICAGRELOR 90 MG: 90 TABLET ORAL at 08:09

## 2017-02-17 RX ADMIN — LISINOPRIL 10 MG: 5 TABLET ORAL at 08:09

## 2017-02-17 RX ADMIN — ASPIRIN 81 MG CHEWABLE TABLET 81 MG: 81 TABLET CHEWABLE at 08:09

## 2017-02-17 RX ADMIN — INSULIN LISPRO 2 UNITS: 100 INJECTION, SOLUTION INTRAVENOUS; SUBCUTANEOUS at 11:49

## 2017-02-17 NOTE — PROGRESS NOTES
Bedside and Verbal shift change report given to Lisa Moe RN (oncoming nurse) by JOCELYNN MELENDEZ (offgoing nurse). Report included the following information SBAR, Kardex, Intake/Output, MAR, Recent Results and Cardiac Rhythm NSr. Shift Summary: Throughout shift patient remained on bedrest. When the patient arrived to unit angiomax was still running, and once completed was turned off. The sheath was then ok to be pulled 2 hrs later. The Sheath was pulled by [facility] and Bertram Martínez RN and hemostasis was achieved at around 1430. There was no signs of bleeding or hematoma. The patient was treated for a generalized aching pain with Tramadol. The patient was also treated with Tylenol this evening for generalized pain. Bedside and Verbal shift change report given to Melina Fernandez RN and Tequila Payne RN (oncoming nurse) by Lisa Moe RN (offgoing nurse). Report included the following information SBAR, Kardex, Intake/Output, MAR, Recent Results and Cardiac Rhythm NSR.

## 2017-02-17 NOTE — PROGRESS NOTES
[facility] [place] [facility]  [address]  [phone]      Medical Progress Note      NAME: Mikey Acevedo   :  1940  MRM:  [id number]    Date/Time: 2017  12:59 PM       Assessment and Plan:   1. NSTEMI. S/p cath with PCI/ALETHEA to L Circumflex. Will cont ASA, Brillinta, BB, statin. Out patient cardiac FU.        2. Hx of CAD/ Takotsubo cardiomyopathy (2012). Continue ASA and BB as above. 3. DM (diabetes mellitus) ([facility] (2012). A1C is 5.8. Resume metformin.      4. Dyslipidemia (2015). Continue crestor     5. HTN (hypertension) (2/15/2017). On lisinopril and coreg. Subjective:     Chief Complaint:  Feels anxious     ROS:  (bold if positive, if negative)      Tolerating PT  Tolerating Diet        Objective:     Last 24hrs VS reviewed since prior progress note.  Most recent are:    Visit Vitals    /73    Pulse 93    Temp 98.2 °F (36.8 °C)    Resp 20    Ht 5' 6\" (1.676 m)    Wt 83.2 kg (183 lb 6.8 oz)    SpO2 97%    BMI 29.61 kg/m2     SpO2 Readings from Last 6 Encounters:   17 97%   16 97%   16 98%   16 97%   11/05/15 95%   06/25/15 97%          Intake/Output Summary (Last 24 hours) at 17 1259  Last data filed at 17 1010   Gross per 24 hour   Intake              320 ml   Output             1200 ml   Net             -880 ml        Physical Exam:    Gen:  Well-developed, well-nourished, in no acute distress  HEENT:  Pink conjunctivae, PERRL, hearing intact to voice, moist mucous membranes  Neck:  Supple, without masses, thyroid non-tender  Resp:  No accessory muscle use, clear breath sounds without wheezes rales or rhonchi  Card:  No murmurs, normal S1, S2 without thrills, bruits or peripheral edema  Abd:  Soft, non-tender, non-distended, normoactive bowel sounds are present, no palpable organomegaly and no detectable hernias  Lymph:  No cervical or inguinal adenopathy  Musc: No cyanosis or clubbing  Skin:  No rashes or ulcers, skin turgor is good  Neuro:  Cranial nerves are grossly intact, no focal motor weakness, follows commands appropriately  Psych:  Good insight, oriented to person, place and time, alert  __________________________________________________________________  Medications Reviewed: (see below)  Medications:     Current Facility-Administered Medications   Medication Dose Route Frequency    ondansetron (ZOFRAN) injection 4 mg  4 mg IntraVENous Q4H PRN    pantoprazole (PROTONIX) tablet 40 mg  40 mg Oral ACB    sodium chloride (NS) flush 5-10 mL  5-10 mL IntraVENous Q8H    sodium chloride (NS) flush 5-10 mL  5-10 mL IntraVENous PRN    hydrocortisone Sod Succ (PF) (SOLU-CORTEF) injection 100 mg  100 mg IntraVENous ONCE PRN    acetaminophen (TYLENOL) tablet 650 mg  650 mg Oral Q4H PRN    aspirin chewable tablet 81 mg  81 mg Oral DAILY    ticagrelor (BRILINTA) tablet 90 mg  90 mg Oral Q12H    sodium chloride (NS) flush 5-10 mL  5-10 mL IntraVENous Q8H    sodium chloride (NS) flush 5-10 mL  5-10 mL IntraVENous PRN    carvedilol (COREG) tablet 3.125 mg  3.125 mg Oral BID WITH MEALS    co-enzyme Q-10 (CO Q-10) capsule 100 mg  100 mg Oral DAILY    conjugated estrogens (PREMARIN) tablet 0.625 mg  0.625 mg Oral QPM    [START ON 2/19/2017] ergocalciferol (ERGOCALCIFEROL) capsule 50,000 Units  50,000 Units Oral every Sunday    fenofibrate nanocrystallized (TRICOR) tablet 145 mg  145 mg Oral QPM    therapeutic multivitamin (THERAGRAN) tablet 1 Tab  1 Tab Oral DAILY    fluticasone (FLONASE) 50 mcg/actuation nasal spray 2 Spray  2 Spray Both Nostrils QHS    lisinopril (PRINIVIL, ZESTRIL) tablet 10 mg  10 mg Oral DAILY    pregabalin (LYRICA) capsule 150 mg  150 mg Oral BID    rosuvastatin (CRESTOR) tablet 20 mg  20 mg Oral QHS    traMADol (ULTRAM) tablet 50 mg  50 mg Oral Q6H PRN    insulin lispro (HUMALOG) injection   SubCUTAneous AC&HS    glucose chewable tablet 16 g  4 Tab Oral PRN    dextrose (D50W) injection syrg 12.5-25 g  12.5-25 g IntraVENous PRN    glucagon (GLUCAGEN) injection 1 mg  1 mg IntraMUSCular PRN    morphine injection 2 mg  2 mg IntraVENous Q4H PRN        Lab Data Reviewed: (see below)  Lab Review:     Recent Labs      02/17/17   0434  02/15/17   1620   WBC  8.1  8.0   HGB  12.7  13.1   HCT  40.7  41.2   PLT  380  431*     Recent Labs      02/17/17   0434  02/15/17   1620   NA  143  142   K  3.7  4.1   CL  108  105   CO2  26  24   GLU  101*  126*   BUN  17  27*   CREA  0.77  0.81   CA  8.6  9.3   MG  1.8   --    PHOS  3.8   --    ALB   --   3.6   TBILI   --   0.2   SGOT   --   21   ALT   --   21   INR   --   1.0     Lab Results   Component Value Date/Time    Glucose (POC) 140 02/17/2017 12:56 PM    Glucose (POC) 171 02/17/2017 11:12 AM    Glucose (POC) 105 02/17/2017 07:36 AM    Glucose (POC) 172 02/16/2017 09:06 PM    Glucose (POC) 99 02/16/2017 04:47 PM     No results for input(s): PH, PCO2, PO2, HCO3, FIO2 in the last 72 hours. Recent Labs      02/15/17   1620   INR  1.0     All Micro Results     None          I have reviewed notes of prior 24hr. Other pertinent lab:       Total time spent with patient: [address] discussed with: Patient, Nursing Staff and >50% of time spent in counseling and coordination of care    Discussed:  Care Plan    Prophylaxis:  Lovenox    Disposition:  Home w/Family           ___________________________________________________    Attending Physician: Trini Dalh MD

## 2017-02-17 NOTE — PROGRESS NOTES
1900 Bedside and Verbal shift change report received from Lindy Kelly (offgoing nurse) by Fairy Olszewski (oncoming nurse). Report included the following information SBAR, Kardex, Procedure Summary, Recent Results and Cardiac Rhythm NSR.   1950 Pt resting in bed. Family at bedside. Right groin cath site intact with no bleeding or hematoma. Pt  reminded to continue to lay flat and remain on bedrest until 2030.  0030 Pt resting in bed. Pt up to bedside commode x 2 without difficulty and with no sign of bleeding from cath site. Pt complained of feeling SOB when laying down. O2 sats 97% on room air. Lungs clear and respirations even and unlabored. 0430 Pt resting. No distress. 0700 Bedside and Verbal shift change report given to Andrea-David (oncoming nurse) by Fairy Olszewski (offgoing nurse). Report included the following information SBAR, Kardex, Intake/Output and Recent Results.

## 2017-02-17 NOTE — PROGRESS NOTES
Nursing informed me that pt will be discharged home later today. I notified Dania Ramirez with [facility] that pt is being discharged as pt will have a one time hospital to home visit. Soraida Hess gave pt the coupon for one month free brilinta. She will take the coupon to her pharmacist.I also contacted [facility] after faxing pt.'s script for [address] [place] [address]. For a 60 day supply,pt.'s brilinta will be 50 dollars which is actually a good price for a 60 day supply of brilinta. I will talk with pt to insure affordability. Thank you.   Martha Khoury

## 2017-02-17 NOTE — PROGRESS NOTES
0710- Bedside shift change report given to [facility] (oncoming nurse) by Jimmy Flores (offgoing nurse). Report included the following information SBAR, Kardex, Intake/Output, MAR and Recent Results. 1000- Cardiac rehab nurse in to see patient.  at bedside. 1256- Pt complaining of shakiness after eating. Pt received subq Insulin for lunchtime BS of 171. Pt states never having Insulin before. BS recheck 140.    1310- Dr. Mana Kerr in to see patient for discharge. 1400- Pt given discharge instructions.  at bedside. Pt given prescription and discount card. IV discontinued to left wrist.  Belongings at bedside.

## 2017-02-17 NOTE — PROGRESS NOTES
Cardiology Progress Note [facility]         NAME:  Nica Rojas   :   1940   MRN:   [id number]     Assessment/Plan:   NSTEMI POA       - ASA,  statin, BB   CAD s/p ALETHEA-> LCX       -  DAPT, statin, BB       - Refer patient to phase II outpatient cardiac rehab @ [facility]      LV dysfunction  W/ WMA   ICM       - BB , ACE-I       - reassess EF in 90 days   Carotid bruit        - OP duplex        - ASA, statin   XOL- lipids pending  HTN- BP OK       Care Coordination: 35 minutes spent reviewing data, dx, treatment w/ pt and family, coordinating care with team including care management, and  arranging follow up. Future Appointments  Date Time Provider Zaira Roldan   2017 3:20 PM MD DARCI Aggarwal   2017 10:40 AM MD DARCI Aggarwal         Subjective:   Nica Rojas is a 68y.o. year old female w/ hx:  HTN, moderate CAD and Takotsubo cardiomyopathy which improved to normal LVEF presented to ER by paramedics with symptoms of chest pain and severe HTN. She was at her PCP office when she complained of chest pain, described as chest heaviness and pressure, non radiating, associated with SOB. She was noted to have BP in 200 range. She was asked to take Lisinopril and Paramedics called. During the ambulance ride she received NTG, ASA with mild improvement in BP to 180 range and improvement in chest pain. ER trop positive and trending up. EKG unchanged from previous EKG.     trop peak 1.45     Overnight activities reviewed:    - -129/60 range    - Tele SR    - K+ 3.7    Cr 0.77   -  Cardiac ROS:Patient denies any exertional chest pain, dyspnea, palpitations, syncope, orthopnea, edema or paroxysmal nocturnal dyspnea. Review of Systems:mild RLQ tenderness  No nausea, indigestion, vomiting, pain, cough, sputum. No bleeding. Taking po. Appetite .  Up in room     CARDIAC EVALUATION   ECHO : EF 40%, Takotsubo Cardiomyopathy. Mild LVH  ECHO 3/22/13: Normal LV function. ECHO 2017; EF 35-40%, mid-distal AS/Septal/apical AK       STRESS: Nuc 13: Normal perfusion, LVEF- 80%. CATH: :12 : LAD: m30%, d: 20%, LCX: p60%, m:60%, OM1: 20%, RCA: p20%, m20%, d20%   CATH 2017: LCX 70% FFR + 0.79   --- s/p ALETHEA (2.25x23)-> LCX        Objective:     Visit Vitals    /60    Pulse 80    Temp 96.7 °F (35.9 °C)    Resp 17    Ht 5' 6\" (1.676 m)    Wt 185 lb (83.9 kg)    SpO2 98%    BMI 29.86 kg/m2        O2 Device: Room air    Temp (24hrs), Av.4 °F (36.3 °C), Min:96.7 °F (35.9 °C), Max:97.7 °F (36.5 °C)        Intake/Output Summary (Last 24 hours) at 17 0732  Last data filed at 17 0207   Gross per 24 hour   Intake              120 ml   Output             1400 ml   Net            -1280 ml       TELE:  SR     General: AAOx3 cooperative, no acute distress. HEENT: Atraumatic. Pink  and moist.  Anicteric sclerae. Neck: Supple,     Lungs: CTA bilaterally. No wheezing/rhonchi/crackles. Heart: PMI:nondisplaced  Regular rhythm, no murmur, no S3, no rubs, no gallops. No JVD. L  carotid bruits. Abdomen: Soft, non-distended, non-tender. + Bowel sounds. No bruits. : vodiding   Extremities: No edema, no clubbing, no cyanosis. No calf tenderness  Groin: soft, tender to palpation, no hematoma, no bruit + pulse   Neurologic: Grossly intact. Alert and oriented X 3. No acute neurological distress. Psych: Good insight. Not anxious nor agitated.       Care Plan discussed with:    Comments   Patient y    Family      RN y    Care Manager                    Consultant:          Data Review:   CMP:   Lab Results   Component Value Date/Time     2017 04:34 AM    K 3.7 2017 04:34 AM     2017 04:34 AM    CO2 26 2017 04:34 AM    AGAP 9 2017 04:34 AM     (H) 2017 04:34 AM    BUN 17 2017 04:34 AM    CREA 0.77 2017 04:34 AM    GFRAA >60 02/17/2017 04:34 AM    GFRNA >60 02/17/2017 04:34 AM    CA 8.6 02/17/2017 04:34 AM    MG 1.8 02/17/2017 04:34 AM    PHOS 3.8 02/17/2017 04:34 AM     CBC:   Lab Results   Component Value Date/Time    WBC 8.1 02/17/2017 04:34 AM    HGB 12.7 02/17/2017 04:34 AM    HCT 40.7 02/17/2017 04:34 AM     02/17/2017 04:34 AM     All Cardiac Markers in the last 24 hours: No results found for: CPK, CKMMB, CKMB, RCK3, CKMBT, CKNDX, CKND1, JLUIS, TROPT, TROIQ, TONY, TROPT, TNIPOC, BNP, BNPP  Recent Glucose Results:   Lab Results   Component Value Date/Time    GLUCPOC 172 (H) 02/16/2017 09:06 PM    GLUCPOC 99 02/16/2017 04:47 PM    GLUCPOC 102 (H) 02/16/2017 12:21 PM     (H) 02/17/2017 04:34 AM     ABG: No results found for: PH, PHI, PCO2, PCO2I, PO2, PO2I, HCO3, HCO3I, FIO2, FIO2I  LIPIDS:  Cholesterol, Total   Date Value Ref Range Status   10/14/2015 150 100 - 199 mg/dL Final     Comment:     **Effective October 26, 2015 the reference interval**    for Cholesterol, Total will be changing to:                           0 - 19 years       100 - 169                              >19 years       100 - 199       Triglyceride   Date Value Ref Range Status   05/27/2015 389 (H) <150 mg/dL Final     HDL Cholesterol   Date Value Ref Range Status   05/27/2015 62 >49 mg/dL Final     LDL, calculated   Date Value Ref Range Status   05/27/2015 185 (H) <100 mg/dL Final     VLDL, calculated   Date Value Ref Range Status   12/11/2012 75.6 MG/DL Final     CHOL/HDL Ratio   Date Value Ref Range Status   12/11/2012 5.2 (H) 0 - 5.0   Final       Medications reviewed  Current Facility-Administered Medications   Medication Dose Route Frequency    ondansetron (ZOFRAN) injection 4 mg  4 mg IntraVENous Q4H PRN    pantoprazole (PROTONIX) tablet 40 mg  40 mg Oral ACB    sodium chloride (NS) flush 5-10 mL  5-10 mL IntraVENous Q8H    sodium chloride (NS) flush 5-10 mL  5-10 mL IntraVENous PRN    hydrocortisone Sod Succ (PF) (SOLU-CORTEF) injection 100 mg  100 mg IntraVENous ONCE PRN    acetaminophen (TYLENOL) tablet 650 mg  650 mg Oral Q4H PRN    aspirin chewable tablet 81 mg  81 mg Oral DAILY    ticagrelor (BRILINTA) tablet 90 mg  90 mg Oral Q12H    sodium chloride (NS) flush 5-10 mL  5-10 mL IntraVENous Q8H    sodium chloride (NS) flush 5-10 mL  5-10 mL IntraVENous PRN    carvedilol (COREG) tablet 3.125 mg  3.125 mg Oral BID WITH MEALS    co-enzyme Q-10 (CO Q-10) capsule 100 mg  100 mg Oral DAILY    conjugated estrogens (PREMARIN) tablet 0.625 mg  0.625 mg Oral QPM    [START ON 2/19/2017] ergocalciferol (ERGOCALCIFEROL) capsule 50,000 Units  50,000 Units Oral every Sunday    fenofibrate nanocrystallized (TRICOR) tablet 145 mg  145 mg Oral QPM    therapeutic multivitamin (THERAGRAN) tablet 1 Tab  1 Tab Oral DAILY    fluticasone (FLONASE) 50 mcg/actuation nasal spray 2 Spray  2 Spray Both Nostrils QHS    lisinopril (PRINIVIL, ZESTRIL) tablet 10 mg  10 mg Oral DAILY    pregabalin (LYRICA) capsule 150 mg  150 mg Oral BID    rosuvastatin (CRESTOR) tablet 20 mg  20 mg Oral QHS    traMADol (ULTRAM) tablet 50 mg  50 mg Oral Q6H PRN    insulin lispro (HUMALOG) injection   SubCUTAneous AC&HS    glucose chewable tablet 16 g  4 Tab Oral PRN    dextrose (D50W) injection syrg 12.5-25 g  12.5-25 g IntraVENous PRN    glucagon (GLUCAGEN) injection 1 mg  1 mg IntraMUSCular PRN    morphine injection 2 mg  2 mg IntraVENous Q4H PRN         Jessica Villarreal RN, ACNP-BC, [facility]

## 2017-02-17 NOTE — DISCHARGE SUMMARY
Hospitalist Discharge Summary     Patient ID:    Araseli Laureano  [id number]  13 y.o.  1940    Admit date: 2/15/2017    Discharge date and time: 2/17/2017    Admission Diagnoses: chest pain  Chest pain  Chest pain  NSTEMI (non-ST elevated myocardial infarction) [facility])  NSTEMI (non-ST elevated myocardial infarction) [facility])    Chronic Diagnoses:    Problem List as of 2/17/2017  Date Reviewed: 2/17/2017          Codes Class Noted - Resolved    NSTEMI (non-ST elevated myocardial infarction) ([facility] ICD-10-CM: I21.4  ICD-9-CM: 410.70  2/17/2017 - Present        HTN (hypertension) ICD-10-CM: I10  ICD-9-CM: 401.9  2/15/2017 - Present        Dyslipidemia ICD-10-CM: E78.5  ICD-9-CM: 272.4  6/25/2015 - Present        Takotsubo cardiomyopathy ICD-10-CM: I51.81  ICD-9-CM: 429.83  12/12/2012 - Present        CAD (coronary artery disease) ICD-10-CM: I25.10  ICD-9-CM: 414.00  12/12/2012 - Present    Overview Addendum 12/12/2012 12:20 PM by Thad Woo MD     CATH 12/11/12  Mid LAD: There was a diffuse 30 % stenosis. Distal LAD: There was a diffuse 20 % stenosis. Proximal circumflex: There was a discrete 60 % stenosis. Mid circumflex: There was a diffuse 60 % stenosis. 1st obtuse marginal: There was a discrete 20 % stenosis in the proximal third of the vessel segment. Proximal RCA: There was a discrete 20 % stenosis at a site with no prior intervention. Mid RCA: There was a discrete 20 % stenosis. Distal RCA: There was a 20 % stenosis.                    DM (diabetes mellitus) ([facility] ICD-10-CM: E11.9  ICD-9-CM: 250.00  12/12/2012 - Present        * (Principal)RESOLVED: Chest pain ICD-10-CM: R07.9  ICD-9-CM: 786.50  2/15/2017 - 2/17/2017        RESOLVED: Elevated troponin ICD-10-CM: R79.89  ICD-9-CM: 790.6  2/15/2017 - 2/17/2017        RESOLVED: NSTEMI (non-ST elevated myocardial infarction) [facility]) ICD-10-CM: I21.4  ICD-9-CM: 410.70  12/10/2012 - 12/12/2012              Discharge Medications:   Current Discharge Medication List      START taking these medications    Details   ticagrelor (BRILINTA) 90 mg tablet Take 1 Tab by mouth every twelve (12) hours every twelve (12) hours. Indications: Thrombosis Prevention after PCI  Qty: 60 Tab, Refills: 6         CONTINUE these medications which have CHANGED    Details   metFORMIN (GLUCOPHAGE) 1,000 mg tablet Take 1 Tab by mouth two (2) times daily (with meals). Qty: 1 Tab, Refills: 1         CONTINUE these medications which have NOT CHANGED    Details   aspirin 81 mg chewable tablet Take 81 mg by mouth daily. therapeutic multivitamin (THERAGRAN) tablet Take 1 Tab by mouth daily. conjugated estrogens (PREMARIN) 0.625 mg tablet Take 0.625 mg by mouth every evening. pregabalin (LYRICA) 150 mg capsule Take 150 mg by mouth two (2) times a day. rosuvastatin (CRESTOR) 20 mg tablet TAKE 1 TABLET NIGHTLY  Qty: 90 Tab, Refills: 0    Associated Diagnoses: Dyslipidemia      carvedilol (COREG) 3.125 mg tablet TAKE 1 TABLET TWICE A DAY  Qty: 180 Tab, Refills: 1      fluticasone (FLONASE) 50 mcg/actuation nasal spray 2 Sprays by Both Nostrils route nightly. co-enzyme Q-10 (CO Q-10) 50 mg capsule Take 2 Caps by mouth daily. Qty: 60 Cap, Refills: 3      fenofibric acid (TRILIPIX) 135 mg capsule Take 135 mg by mouth daily. lisinopril (PRINIVIL, ZESTRIL) 10 mg tablet TAKE 1 TABLET DAILY  Qty: 90 Tab, Refills: 3      omega-3 fatty acids-vitamin e (FISH OIL) 1,000 mg cap Take 1 Cap by mouth.      ergocalciferol (VITAMIN D2) 50,000 unit capsule Take 50,000 Units by mouth every Sunday. traMADol (ULTRAM) 50 mg tablet Take 50 mg by mouth every six (6) hours as needed for Pain. Patient will take 2 tabs every 8 hours as needed         STOP taking these medications       multivitamin (ONE A DAY) tablet Comments:   Reason for Stopping: Follow up Care:    [facility], NP in 1-2 weeks  2.  Dr Devora Branch    Diet:  Cardiac Diet and Diabetic Diet    Disposition:  Home. Advanced Directive:    Discharge Exam:  See today's note. CONSULTATIONS: Cardiology    Significant Diagnostic Studies:   Recent Labs      02/17/17   0434  02/15/17   1620   WBC  8.1  8.0   HGB  12.7  13.1   HCT  40.7  41.2   PLT  380  431*     Recent Labs      02/17/17   0434  02/15/17   1620   NA  143  142   K  3.7  4.1   CL  108  105   CO2  26  24   BUN  17  27*   CREA  0.77  0.81   GLU  101*  126*   CA  8.6  9.3   MG  1.8   --    PHOS  3.8   --      Recent Labs      02/15/17   1620   SGOT  21   ALT  21   AP  39*   TBILI  0.2   TP  7.8   ALB  3.6   GLOB  4.2*   LPSE  215     Recent Labs      02/15/17   1620   INR  1.0   PTP  10.3      No results for input(s): FE, TIBC, PSAT, FERR in the last 72 hours. No results for input(s): PH, PCO2, PO2 in the last 72 hours. Recent Labs      02/16/17   0724  02/16/17   0114  02/15/17   1911   CPK  87  77  77   CKMB  3.8*  2.7  1.8     Lab Results   Component Value Date/Time    Glucose (POC) 140 02/17/2017 12:56 PM    Glucose (POC) 171 02/17/2017 11:12 AM    Glucose (POC) 105 02/17/2017 07:36 AM    Glucose (POC) 172 02/16/2017 09:06 PM    Glucose (POC) 99 02/16/2017 04:47 PM             HOSPITAL COURSE & TREATMENT RENDERED:   1.  NSTEMI. S/p cath with PCI/ALETHEA to L Circumflex. Will cont ASA, Brillinta, BB, statin. Out patient cardiac FU.        2. Hx of CAD/ Takotsubo cardiomyopathy (12/12/2012). Continue ASA and BB as above.       3. DM (diabetes mellitus) ([facility] (12/12/2012). A1C is 5.8. Resume metformin.       4. Dyslipidemia (6/25/2015). Continue crestor      5. HTN (hypertension) (2/15/2017). On lisinopril and coreg.     Pt is discharged in improved condition       Signed:  Deon Yap MD  2/17/2017  1:31 PM

## 2017-02-17 NOTE — PROGRESS NOTES
I met with pt and her  to discuss the cost of the brilinta. Pt and her  bickered over the brilinta for several minutes. The ultimate decision is (for now) that pt will have her brilinta filled @ [address]. It will be 50 dollars. There are 11 refills on the script. I informed the pt & her   that @ any point ,she could ask Dr Jaime Spence to switch her to plavix. Pt then asked me about her estrogen & if she should continue this. I asked cardiology who has deferred this to pt.'s PCP for clarification. Pt states she will ask her PCP. Pt informed that [facility] will see her for a one time [facility] visit. Cardiology nurse navigators (Jazmyn Espitia 10) informed regarding pt.'s discharge.   Ilene Hartman

## 2017-02-17 NOTE — DISCHARGE INSTRUCTIONS
ACUTE DIAGNOSES:  chest pain  Chest pain  Chest pain  NSTEMI (non-ST elevated myocardial infarction) [facility])  NSTEMI (non-ST elevated myocardial infarction) [facility])    CHRONIC MEDICAL DIAGNOSES:  Problem List as of 2/17/2017  Date Reviewed: 2/17/2017          Codes Class Noted - Resolved    NSTEMI (non-ST elevated myocardial infarction) ([facility] ICD-10-CM: I21.4  ICD-9-CM: 410.70  2/17/2017 - Present        HTN (hypertension) ICD-10-CM: I10  ICD-9-CM: 401.9  2/15/2017 - Present        Dyslipidemia ICD-10-CM: E78.5  ICD-9-CM: 272.4  6/25/2015 - Present        Takotsubo cardiomyopathy ICD-10-CM: I51.81  ICD-9-CM: 429.83  12/12/2012 - Present        CAD (coronary artery disease) ICD-10-CM: I25.10  ICD-9-CM: 414.00  12/12/2012 - Present    Overview Addendum 12/12/2012 12:20 PM by René Garcia MD     CATH 12/11/12  Mid LAD: There was a diffuse 30 % stenosis. Distal LAD: There was a diffuse 20 % stenosis. Proximal circumflex: There was a discrete 60 % stenosis. Mid circumflex: There was a diffuse 60 % stenosis. 1st obtuse marginal: There was a discrete 20 % stenosis in the proximal third of the vessel segment. Proximal RCA: There was a discrete 20 % stenosis at a site with no prior intervention. Mid RCA: There was a discrete 20 % stenosis. Distal RCA: There was a 20 % stenosis. DM (diabetes mellitus) ([facility] ICD-10-CM: E11.9  ICD-9-CM: 250.00  12/12/2012 - Present        * (Principal)RESOLVED: Chest pain ICD-10-CM: R07.9  ICD-9-CM: 786.50  2/15/2017 - 2/17/2017        RESOLVED: Elevated troponin ICD-10-CM: R79.89  ICD-9-CM: 790.6  2/15/2017 - 2/17/2017        RESOLVED: NSTEMI (non-ST elevated myocardial infarction) [facility]) ICD-10-CM: I21.4  ICD-9-CM: 410.70  12/10/2012 - 12/12/2012              DISCHARGE MEDICATIONS:          · It is important that you take the medication exactly as they are prescribed.    · Keep your medication in the bottles provided by the pharmacist and keep a list of the medication names, dosages, and times to be taken in your wallet. · Do not take other medications without consulting your doctor. DIET:  Cardiac Diet and Diabetic Diet    ACTIVITY: Activity as tolerated    ADDITIONAL INFORMATION: If you experience any of the following symptoms then please call your primary care physician or return to the emergency room if you cannot get hold of your doctor: Fever, chills, nausea, vomiting, diarrhea, change in mentation, falling, bleeding, shortness of breath. FOLLOW UP CARE:  Dr. Rachelle Lee NP  you are to call and set up an appointment to see them in 2 weeks. Follow-up with Dr Steve Stoll on 3/2/17 at 9 am      Information obtained by :  I understand that if any problems occur once I am at home I am to contact my physician. I understand and acknowledge receipt of the instructions indicated above.                                                                                                                                            Physician's or R.N.'s Signature                                                                  Date/Time                                                                                                                                              Patient or Representative Signature                                                          Date/Time              [facility] Office: [facility] [address] [facility]      [phone]  [address] [facility] office: [facility]                              [phone]    Patient Discharge Instructions    Maximino Welch / [id number] : 1940    Admitted 2/15/2017 Discharged: 2017   Cardiologist: Dr Steve Stoll       PCP: Rachelle Lee NP     Renal:   Pulm:  Diagnosis:   Patient Active Problem List   Diagnosis Code    Takotsubo cardiomyopathy I51.81    CAD (coronary artery disease) I25.10    DM (diabetes mellitus) ([facility] E11.9    Dyslipidemia E78.5    Chest pain R07.9    HTN (hypertension) I10    Elevated troponin R79.89     Procedures/Test:   CATH 2/16/2017: stent to left circumflex   Lab Results  Component Value Date/Time   Cholesterol, total 272 05/27/2015 10:42 AM   Cholesterol, Total 150 10/14/2015 11:39 AM   HDL Cholesterol 62 05/27/2015 10:42 AM   LDL, calculated 185 05/27/2015 10:42 AM   Triglyceride 389 05/27/2015 10:42 AM   CHOL/HDL Ratio 5.2 12/11/2012 04:27 AM       Lab Results  Component Value Date/Time   ALT (SGPT) 21 02/15/2017 04:20 PM   AST (SGOT) 21 02/15/2017 04:20 PM   Alk. phosphatase 39 02/15/2017 04:20 PM   Bilirubin, total 0.2 02/15/2017 04:20 PM         · It is important that you take the medication exactly as they are prescribed. · Keep your medication in the bottles provided by the pharmacist and keep a list of the medication names, dosages, and times to be taken in your wallet. · Do not take other medications without consulting your doctor. BRING ALL of YOUR MEDICINES TO YOUR OFFICE VISIT with .     Cardiac Catheterization  Discharge Instructions  *Check the puncture site frequently for swelling or bleeding. If you see any bleeding, lie down and apply pressure over the area with a clean town or washcloth. Notify your doctor for any redness, swelling, drainage or oozing from the puncture site. Notify your doctor for any fever or chills. *If the leg or arm with the puncture becomes cold, numb or painful, call Dr Crow Vidal     *Activity should be limited for the next 48 hours. Climb stairs as little as possible and avoid any stooping, bending or strenuous activity for 48 hours. No strenuous activity or heavy lifting over 10 lbs. for 7 days. · You may take a shower 24 hours after your cardiac catheterization. Be sure to get the dressing wet and then remove it; gently wash the area with warm soapy water. Pat dry and leave open to air. To help prevent infections, be sure to keep the cath site clean and dry.   No lotions, creams, powders, ointments, etc. in the cath site for approximately 1 week. · Do not take a tub bath, get in a hot tub or swimming pool for approximately 5 days or until the cath site is completely healed. · Drink plenty of fluids for 24-48 hours after your cath to flush the contrast dye from your kidneys. No alcoholic beverages for 24 hours. You may resume your previous diet (low fat, low cholesterol) after your cath. · Do not drive or operate heavy machinery for 48 hours. · You may resume your usual diet. Drink more fluids than usual.  · Have a responsible person drive you home and stay with you for at least 24 hours after your heart catheterization/angiography. · *After your cath, some bruising or discomfort is common during the healing process. Tylenol, 1-2 tablets every 6 hours as needed, is recommended if you experience any discomfort. If you experience any signs or symptoms of infection such as fever, chills, or poorly healing incision, persistent tenderness or swelling in the groin, redness and/or warmth to the touch, numbness, significant tingling or pain at the groin site or affected extremity, rash, drainage from the cath site, or if the leg feels tight or swollen, call your physician right away.  If bleeding at the cath site occurs, take a clean gauze pad and apply direct pressure to the groin just above the puncture site. Call 911 immediately, and continue to apply direct pressure until an ambulance gets to your location.  You may return to work  2  days after your cardiac cath if no groin bleeding. Activity: Resume normal activity letting fatigue be the guide. Do not lift over 10 pounds for 7 days. Diet: American Heart association, low fat, 1500 mg sodium  Diabetic.    Call for or return to ER for recurrent or prolonged chest pain, shortness of breath, lightheadedness, dizzy, palpitations, passing out, swelling in the legs or abdomen, pain or swelling  At the cath site. Lifestyle changes  IF you smoke, Stop smoking go to : PrimeLetters.ca. aspx for text reminders    Watch [place] over [address] a heart-healthy diet that is low in cholesterol, saturated fat, and salt, and is full of fruits, vegetables and whole-grains. Eat at least two servings of fish each week. You may get more details about how to eat healthy, but these tips can help you get started. Www.aha.com  When should you call for help? Call 911 anytime you think you may need emergency care. For example, call if:  You have signs of a heart attack. These may include:  Chest pain or pressure. Sweating. Shortness of breath. Nausea or vomiting. Pain that spreads from the chest to the neck, jaw, or one or both shoulders or arms. Dizziness or lightheadedness. A fast or irregular pulse. After calling 911, chew 1 adult-strength aspirin. Wait for an ambulance. Do not try to drive yourself. You passed out (lost consciousness). You feel like you are having another heart attack. Call your doctor now or seek immediate medical care if:  You have had any chest pain, even if it has gone away. You have new or increased shortness of breath. You are dizzy or lightheaded, or you feel like you may faint. Watch closely for changes in your health, and be sure to contact your doctor if you have any problem      Information obtained by :  I understand that if any problems occur once I am at home I am to contact my physician. I understand and acknowledge receipt of the instructions indicated above.                                                                                                                                            R.N.'s Signature                                                                  Date/Time Patient or Representative Signature                                                          Date/Time         Heart Attack: Care Instructions  Your Care Instructions    A heart attack (myocardial infarction, or MI) occurs when one or more of the coronary arteries, which supply the heart with oxygen-rich blood, is blocked. A blockage usually occurs when plaque inside the artery breaks open and a blood clot forms in the artery. After a heart attack, you may be worried about your future. Over the next several weeks, your heart will start to heal. Though it can be hard to break old habits, you can prevent another heart attack by making some lifestyle changes and by taking medicines. You may use this information for ideas about what to do at home to speed your recovery. Follow-up care is a key part of your treatment and safety. Be sure to make and go to all appointments, and call your doctor if you are having problems. It's also a good idea to know your test results and keep a list of the medicines you take. How can you care for yourself at home? Activity  · Until your doctor says it is okay, do not do strenuous exercise. And do not lift, pull, or push anything heavy. Ask your doctor what types of activities are safe for you. · If your doctor has not set you up with a cardiac rehabilitation (rehab) program, talk to him or her about whether that is right for you. Cardiac rehab includes supervised exercise. It also includes help with diet and lifestyle changes and emotional support. It may reduce your risk of future heart problems. · Increase your activities slowly. Take short rest breaks when you get tired. · If your doctor recommends it, get more exercise. Walking is a good choice. Bit by bit, increase the amount you walk every day. Try for at least 30 minutes on most days of the week.  You also may want to swim, bike, or do other activities. Talk with your doctor before you start an exercise program to make sure it is safe for you. · Ask your doctor when you can drive, go back to work, and do other daily activities again. · You can have sex as soon as you feel ready for it. Often this means when you can easily walk around or climb stairs. Talk with your doctor if you have any concerns. If you are taking nitroglycerin, do not take erection-enhancing medicine such as sildenafil (Viagra), tadalafil (Cialis), or vardenafil (Levitra) . Lifestyle changes  · Do not smoke. Smoking increases your risk of another heart attack. If you need help quitting, talk to your doctor about stop-smoking programs and medicines. These can increase your chances of quitting for good. · Eat a heart-healthy diet that is low in saturated fat and salt, and is full of fruits, vegetables and whole-grains. Eat at least two servings of fish each week. You may get more details about how to eat healthy. But these tips can help you get started. · Stay at a healthy weight, or lose weight if you need to. Medicines  · Be safe with medicines. Take your medicines exactly as prescribed. Call your doctor if you think you are having a problem with your medicine. You will get more details on the specific medicines your doctor prescribes. Do not stop taking your medicine unless your doctor tells you to. Not taking your medicine might raise your risk of having another heart attack. · You may need several medicines to help lower your risk of another heart attack. These include:  ¨ Blood pressure medicines such as angiotensin-converting enzyme (ACE) inhibitors, ARBs (angiotensin II receptor blockers), and beta-blockers. ¨ Cholesterol medicine called statins. ¨ Aspirin and other blood thinners. These prevent blood clots that can cause a heart attack. · If your doctor has given you nitroglycerin, keep it with you at all times.  If you have angina symptoms, such as chest pain or pressure, sit down and rest. Take the first dose of nitroglycerin as directed. If symptoms get worse or are not getting better within 5 minutes, call 911 right away. Stay on the phone. The emergency  will tell you what to do. · Do not take any over-the-counter medicines, vitamins, or herbal products without talking to your doctor first.  Staying healthy  · Manage other health conditions such as high blood pressure and diabetes. · Avoid colds and flu. Get a pneumococcal vaccine shot. If you have had one before, ask your doctor whether you need another dose. Get the flu vaccine every year. If you must be around people with colds or flu, wash your hands often. · Be sure to tell your doctor about any angina symptoms you have had, even if they went away. Pay attention to your angina symptoms. Know what is typical for you and learn how to control it. Know when to call for help. · Talk to your family, friends, or a counselor about your feelings. It is normal to feel frightened, angry, hopeless, helpless, and even guilty. Talking openly about bad feelings can help you cope. If you have symptoms of depression, talk to your doctor. When should you call for help? Call 911 anytime you think you may need emergency care. For example, call if:  · You have symptoms of a heart attack. These may include:  ¨ Chest pain or pressure, or a strange feeling in the chest.  ¨ Sweating. ¨ Shortness of breath. ¨ Nausea or vomiting. ¨ Pain, pressure, or a strange feeling in the back, neck, jaw, or upper belly or in one or both shoulders or arms. ¨ Lightheadedness or sudden weakness. ¨ A fast or irregular heartbeat. After you call 911, the  may tell you to chew 1 adult-strength or 2 to 4 low-dose aspirin. Wait for an ambulance. Do not try to drive yourself.   · You have angina symptoms (such as chest pain or pressure) that do not go away with rest or are not getting better within 5 minutes after you take a dose of nitroglycerin. · You passed out (lost consciousness). · You feel like you are having another heart attack. Call your doctor now or seek immediate medical care if:  · You are having angina symptoms, such as chest pain or pressure, more often than usual, or the symptoms are different or worse than usual.  · You have new or increased shortness of breath. · You are dizzy or lightheaded, or you feel like you may faint. Watch closely for changes in your health, and be sure to contact your doctor if you have any problems. Where can you learn more? Go to http://marty-mario.info/. Enter [id number] in the search box to learn more about \"Heart Attack: Care Instructions. \"  Current as of: January 27, 2016  Content Version: 11.1  © 8438-8227 [employer]. Care instructions adapted under license by [employer] (which disclaims liability or warranty for this information). If you have questions about a medical condition or this instruction, always ask your healthcare professional. [place] [address] any warranty or liability for your use of this information.

## 2017-02-17 NOTE — PROGRESS NOTES
Pharmacist Discharge Medication Reconciliation    Discharge Provider:  Dr Daniel Hernández       Discharge Medications:      Current Discharge Medication List        START taking these medications         Dose & Instructions Dispensing Information Comments   Morning Noon Evening Bedtime      ticagrelor 90 mg tablet   Commonly known as:  BRILINTA       Your next dose is: Today, Tomorrow       Other:  _________            Dose:  90 mg   Take 1 Tab by mouth every twelve (12) hours every twelve (12) hours. Indications: Thrombosis Prevention after PCI    Quantity:  60 Tab   Refills:  11                               CONTINUE these medications which have CHANGED         Dose & Instructions Dispensing Information Comments   Morning Noon Evening Bedtime      lisinopril 10 mg tablet   Commonly known as:  Piedad Bush   What changed:    - how much to take  - how to take this  - when to take this  - additional instructions       Your next dose is: Today, Tomorrow       Other:  _________            TAKE 1 TABLET DAILY    Quantity:  90 Tab   Refills:  3                         rosuvastatin 20 mg tablet   Commonly known as:  CRESTOR   What changed:    - how much to take  - how to take this  - when to take this  - additional instructions       Your next dose is: Today, Tomorrow       Other:  _________            TAKE 1 TABLET NIGHTLY    Quantity:  90 Tab   Refills:  0                               CONTINUE these medications which have NOT CHANGED         Dose & Instructions Dispensing Information Comments   Morning Noon Evening Bedtime      aspirin 81 mg chewable tablet       Your next dose is: Today, Tomorrow       Other:  _________            Dose:  81 mg   Take 81 mg by mouth daily. Refills:  0                         carvedilol 3.125 mg tablet   Commonly known as:  COREG       Your next dose is:   Today, Tomorrow       Other:  _________            TAKE 1 TABLET TWICE A DAY    Quantity:  180 Tab   Refills:  1 co-enzyme Q-10 50 mg capsule   Commonly known as:  CO Q-10       Your next dose is: Today, Tomorrow       Other:  _________            Dose:  100 mg   Take 2 Caps by mouth daily. Quantity:  60 Cap   Refills:  3                         FISH OIL 1,000 mg Cap   Generic drug:  omega-3 fatty acids-vitamin e       Your next dose is: Today, Tomorrow       Other:  _________            Dose:  1 Cap   Take 1 Cap by mouth. Refills:  0                         FLONASE 50 mcg/actuation nasal spray   Generic drug:  fluticasone       Your next dose is: Today, Tomorrow       Other:  _________            Dose:  2 Spray   2 Sprays by Both Nostrils route nightly. Refills:  0                         metFORMIN 1,000 mg tablet   Commonly known as:  GLUCOPHAGE   Start taking on:  2/19/2017       Your next dose is: Today, Tomorrow       Other:  _________            Dose:  1000 mg   Take 1 Tab by mouth two (2) times daily (with meals). Quantity:  1 Tab   Refills:  1                         pregabalin 150 mg capsule   Commonly known as:  LYRICA       Your next dose is: Today, Tomorrow       Other:  _________            Dose:  150 mg   Take 150 mg by mouth two (2) times a day. Refills:  0                         PREMARIN 0.625 mg tablet   Generic drug:  conjugated estrogens       Your next dose is: Today, Tomorrow       Other:  _________            Dose:  0.625 mg   Take 0.625 mg by mouth every evening. Refills:  0                         therapeutic multivitamin tablet   Commonly known as:  [facility] next dose is: Today, Tomorrow       Other:  _________            Dose:  1 Tab   Take 1 Tab by mouth daily. Refills:  0                         traMADol 50 mg tablet   Commonly known as:  ULTRAM       Your next dose is: Today, Tomorrow       Other:  _________            Dose:  50 mg   Take 50 mg by mouth every six (6) hours as needed for Pain.  Patient will take 2 tabs every 8 hours as needed Refills:  0                         TRILIPIX 135 mg capsule   Generic drug:  fenofibric acid       Your next dose is: Today, Tomorrow       Other:  _________            Dose:  135 mg   Take 135 mg by mouth daily. Refills:  0                         VITAMIN D2 50,000 unit capsule   Generic drug:  ergocalciferol       Your next dose is: Today, Tomorrow       Other:  _________            Dose:  38626 Units   Take 50,000 Units by mouth every Sunday. Refills:  0                                  Where to Get Your Medications        Information on where to get these meds will be given to you by the nurse or doctor. ! Ask your nurse or doctor about these medications     metFORMIN 1,000 mg tablet    ticagrelor 90 mg tablet                The patient's chart, MAR, and AVS were reviewed by   Itz Davis.  Ed Jimenes Terri 23: [phone]

## 2017-02-17 NOTE — PROGRESS NOTES
Letter of Status Determination:   Recommend hospitalization status upgraded from Observation to Inpatient  Status    Pt Name:  Jia Barcenas   MR#  [id number]   [facility]#   [id number]   [address]  3003/01  @ [place] date  2/15/2017  3:29 PM   Current Attending Physician  Maeve Mayo MD   Principal diagnosis  Chest pain      Clinicals  68 y.o. y.o  female hospitalized with above diagnosis   The pt has takotsubo cardiomyopathy, HTN, CAD, Hyperlipidemia, etc.     Since admission the pt has been diagnosed to have NSTEMI      [place] (MCG) criteria   Does  apply Myocardial Infarction ORG: M-230 (ISC)   STATUS DETERMINATION  Based on documented presenting clinical data, comorbid conditions, high risk of adverse events and deterioration, it is our recommendation that the patient's status should be upgraded from OBSERVATION to INPATIENT status. The final decision of the patient's hospitalization status depends on the attending physician's decision.         Additional comments     [facility] [place] [facility] [place] [address] MEDICARE ADVANTAGE Phone:     Subscriber: Karla Daly Subscriber#: [facility]#: [id number] Precert#:         Payor: Britni Ortiz / Plan: [employer]  / Product Type: Managed Care Medicare /         Mia Walker MD MPH FACP     Physician Advisor    [id number] [facility],[address] Documentation Management Program  [address]   [facility], [address] [place] [address]    Cell  [phone]

## 2017-02-20 ENCOUNTER — HOME CARE VISIT (OUTPATIENT)
Dept: HOME HEALTH SERVICES | Facility: HOME HEALTH | Age: 77
End: 2017-02-20

## 2017-02-20 ENCOUNTER — APPOINTMENT (OUTPATIENT)
Dept: GENERAL RADIOLOGY | Age: 77
End: 2017-02-20
Attending: EMERGENCY MEDICINE
Payer: MEDICARE

## 2017-02-20 ENCOUNTER — HOSPITAL ENCOUNTER (EMERGENCY)
Age: 77
Discharge: HOME | End: 2017-02-20
Attending: EMERGENCY MEDICINE | Admitting: INTERNAL MEDICINE
Payer: MEDICARE

## 2017-02-20 VITALS
HEART RATE: 102 BPM | HEIGHT: 66 IN | TEMPERATURE: 97.5 F | WEIGHT: 184.3 LBS | OXYGEN SATURATION: 98 % | BODY MASS INDEX: 29.62 KG/M2 | DIASTOLIC BLOOD PRESSURE: 78 MMHG | SYSTOLIC BLOOD PRESSURE: 155 MMHG | RESPIRATION RATE: 22 BRPM

## 2017-02-20 DIAGNOSIS — R06.02 SOB (SHORTNESS OF BREATH): Primary | ICD-10-CM

## 2017-02-20 PROBLEM — I50.21 SYSTOLIC CHF, ACUTE (HCC): Status: ACTIVE | Noted: 2017-02-20

## 2017-02-20 LAB
ANION GAP BLD CALC-SCNC: 13 MMOL/L (ref 5–15)
ATRIAL RATE: 83 BPM
ATRIAL RATE: 94 BPM
BASOPHILS # BLD AUTO: 0 K/UL (ref 0–0.1)
BASOPHILS # BLD: 0 % (ref 0–1)
BNP SERPL-MCNC: 4291 PG/ML (ref 0–450)
BUN SERPL-MCNC: 22 MG/DL (ref 6–20)
BUN/CREAT SERPL: 29 (ref 12–20)
CALCIUM SERPL-MCNC: 9.2 MG/DL (ref 8.5–10.1)
CALCULATED P AXIS, ECG09: 53 DEGREES
CALCULATED P AXIS, ECG09: 56 DEGREES
CALCULATED R AXIS, ECG10: 27 DEGREES
CALCULATED R AXIS, ECG10: 31 DEGREES
CALCULATED T AXIS, ECG11: 68 DEGREES
CALCULATED T AXIS, ECG11: 77 DEGREES
CHLORIDE SERPL-SCNC: 108 MMOL/L (ref 97–108)
CK MB CFR SERPL CALC: 1.4 % (ref 0–2.5)
CK MB CFR SERPL CALC: 2.6 % (ref 0–2.5)
CK MB SERPL-MCNC: 1.2 NG/ML (ref 5–25)
CK MB SERPL-MCNC: 2 NG/ML (ref 5–25)
CK SERPL-CCNC: 78 U/L (ref 26–192)
CK SERPL-CCNC: 88 U/L (ref 26–192)
CK SERPL-CCNC: 93 U/L (ref 26–192)
CO2 SERPL-SCNC: 20 MMOL/L (ref 21–32)
CREAT SERPL-MCNC: 0.77 MG/DL (ref 0.55–1.02)
DIAGNOSIS, 93000: NORMAL
DIAGNOSIS, 93000: NORMAL
EOSINOPHIL # BLD: 0.3 K/UL (ref 0–0.4)
EOSINOPHIL NFR BLD: 3 % (ref 0–7)
ERYTHROCYTE [DISTWIDTH] IN BLOOD BY AUTOMATED COUNT: 14.6 % (ref 11.5–14.5)
GLUCOSE BLD STRIP.AUTO-MCNC: 105 MG/DL (ref 65–100)
GLUCOSE SERPL-MCNC: 127 MG/DL (ref 65–100)
HCT VFR BLD AUTO: 38 % (ref 35–47)
HGB BLD-MCNC: 11.9 G/DL (ref 11.5–16)
LYMPHOCYTES # BLD AUTO: 33 % (ref 12–49)
LYMPHOCYTES # BLD: 2.9 K/UL (ref 0.8–3.5)
MCH RBC QN AUTO: 26.6 PG (ref 26–34)
MCHC RBC AUTO-ENTMCNC: 31.3 G/DL (ref 30–36.5)
MCV RBC AUTO: 85 FL (ref 80–99)
MONOCYTES # BLD: 0.4 K/UL (ref 0–1)
MONOCYTES NFR BLD AUTO: 5 % (ref 5–13)
NEUTS SEG # BLD: 5.1 K/UL (ref 1.8–8)
NEUTS SEG NFR BLD AUTO: 59 % (ref 32–75)
NRBC # BLD: 0.13 K/UL (ref 0–0.01)
NRBC BLD-RTO: 1.5 PER 100 WBC
P-R INTERVAL, ECG05: 148 MS
P-R INTERVAL, ECG05: 172 MS
PLATELET # BLD AUTO: 436 K/UL (ref 150–400)
POTASSIUM SERPL-SCNC: 4.2 MMOL/L (ref 3.5–5.1)
Q-T INTERVAL, ECG07: 376 MS
Q-T INTERVAL, ECG07: 390 MS
QRS DURATION, ECG06: 80 MS
QRS DURATION, ECG06: 88 MS
QTC CALCULATION (BEZET), ECG08: 458 MS
QTC CALCULATION (BEZET), ECG08: 470 MS
RBC # BLD AUTO: 4.47 M/UL (ref 3.8–5.2)
RBC MORPH BLD: ABNORMAL
SERVICE CMNT-IMP: ABNORMAL
SODIUM SERPL-SCNC: 141 MMOL/L (ref 136–145)
TROPONIN I BLD-MCNC: 0.07 NG/ML (ref 0–0.08)
TROPONIN I SERPL-MCNC: 0.08 NG/ML
TROPONIN I SERPL-MCNC: 0.11 NG/ML
VENTRICULAR RATE, ECG03: 83 BPM
VENTRICULAR RATE, ECG03: 94 BPM
WBC # BLD AUTO: 8.7 K/UL (ref 3.6–11)
WBC MORPH BLD: ABNORMAL
WBC NRBC COR # BLD: ABNORMAL 10*3/UL

## 2017-02-20 PROCEDURE — 82962 GLUCOSE BLOOD TEST: CPT

## 2017-02-20 PROCEDURE — 99285 EMERGENCY DEPT VISIT HI MDM: CPT

## 2017-02-20 PROCEDURE — 93005 ELECTROCARDIOGRAM TRACING: CPT

## 2017-02-20 PROCEDURE — 74011000250 HC RX REV CODE- 250: Performed by: INTERNAL MEDICINE

## 2017-02-20 PROCEDURE — 36415 COLL VENOUS BLD VENIPUNCTURE: CPT | Performed by: EMERGENCY MEDICINE

## 2017-02-20 PROCEDURE — 80048 BASIC METABOLIC PNL TOTAL CA: CPT | Performed by: EMERGENCY MEDICINE

## 2017-02-20 PROCEDURE — 84484 ASSAY OF TROPONIN QUANT: CPT | Performed by: EMERGENCY MEDICINE

## 2017-02-20 PROCEDURE — 82550 ASSAY OF CK (CPK): CPT | Performed by: INTERNAL MEDICINE

## 2017-02-20 PROCEDURE — 74011250637 HC RX REV CODE- 250/637: Performed by: EMERGENCY MEDICINE

## 2017-02-20 PROCEDURE — 83880 ASSAY OF NATRIURETIC PEPTIDE: CPT | Performed by: SPECIALIST

## 2017-02-20 PROCEDURE — 71010 XR CHEST PORT: CPT

## 2017-02-20 PROCEDURE — 96374 THER/PROPH/DIAG INJ IV PUSH: CPT

## 2017-02-20 PROCEDURE — 85025 COMPLETE CBC W/AUTO DIFF WBC: CPT | Performed by: EMERGENCY MEDICINE

## 2017-02-20 RX ORDER — GUAIFENESIN 100 MG/5ML
81 LIQUID (ML) ORAL
Status: DISCONTINUED | OUTPATIENT
Start: 2017-02-20 | End: 2017-02-20

## 2017-02-20 RX ORDER — LISINOPRIL 5 MG/1
10 TABLET ORAL
Status: DISCONTINUED | OUTPATIENT
Start: 2017-02-20 | End: 2017-02-20

## 2017-02-20 RX ORDER — SODIUM CHLORIDE 0.9 % (FLUSH) 0.9 %
5-10 SYRINGE (ML) INJECTION EVERY 8 HOURS
Status: DISCONTINUED | OUTPATIENT
Start: 2017-02-20 | End: 2017-02-20

## 2017-02-20 RX ORDER — PREGABALIN 75 MG/1
150 CAPSULE ORAL 2 TIMES DAILY
Status: DISCONTINUED | OUTPATIENT
Start: 2017-02-20 | End: 2017-02-20

## 2017-02-20 RX ORDER — INSULIN LISPRO 100 [IU]/ML
INJECTION, SOLUTION INTRAVENOUS; SUBCUTANEOUS
Status: DISCONTINUED | OUTPATIENT
Start: 2017-02-20 | End: 2017-02-20

## 2017-02-20 RX ORDER — DEXTROSE 50 % IN WATER (D50W) INTRAVENOUS SYRINGE
12.5-25 AS NEEDED
Status: DISCONTINUED | OUTPATIENT
Start: 2017-02-20 | End: 2017-02-20

## 2017-02-20 RX ORDER — BUMETANIDE 0.25 MG/ML
1 INJECTION INTRAMUSCULAR; INTRAVENOUS 2 TIMES DAILY
Status: DISCONTINUED | OUTPATIENT
Start: 2017-02-20 | End: 2017-02-20

## 2017-02-20 RX ORDER — ENOXAPARIN SODIUM 100 MG/ML
40 INJECTION SUBCUTANEOUS EVERY 24 HOURS
Status: DISCONTINUED | OUTPATIENT
Start: 2017-02-20 | End: 2017-02-20

## 2017-02-20 RX ORDER — MAGNESIUM SULFATE 100 %
4 CRYSTALS MISCELLANEOUS AS NEEDED
Status: DISCONTINUED | OUTPATIENT
Start: 2017-02-20 | End: 2017-02-20

## 2017-02-20 RX ORDER — ACETAMINOPHEN 500 MG
1000 TABLET ORAL
COMMUNITY

## 2017-02-20 RX ORDER — LISINOPRIL 10 MG/1
10 TABLET ORAL
COMMUNITY
End: 2018-03-26 | Stop reason: SDUPTHER

## 2017-02-20 RX ORDER — UBIDECARENONE 50 MG
100 CAPSULE ORAL
COMMUNITY

## 2017-02-20 RX ORDER — CARVEDILOL 6.25 MG/1
3.12 TABLET ORAL 2 TIMES DAILY WITH MEALS
Status: DISCONTINUED | OUTPATIENT
Start: 2017-02-20 | End: 2017-02-20

## 2017-02-20 RX ORDER — BUMETANIDE 0.5 MG/1
1 TABLET ORAL DAILY
Qty: 30 TAB | Refills: 0 | Status: SHIPPED | OUTPATIENT
Start: 2017-02-20 | End: 2017-03-02 | Stop reason: SDUPTHER

## 2017-02-20 RX ORDER — ACETAMINOPHEN 500 MG
1000 TABLET ORAL
Status: DISCONTINUED | OUTPATIENT
Start: 2017-02-20 | End: 2017-02-20

## 2017-02-20 RX ORDER — ROSUVASTATIN CALCIUM 10 MG/1
20 TABLET, COATED ORAL
Status: DISCONTINUED | OUTPATIENT
Start: 2017-02-20 | End: 2017-02-20

## 2017-02-20 RX ORDER — GUAIFENESIN 100 MG/5ML
324 LIQUID (ML) ORAL
Status: COMPLETED | OUTPATIENT
Start: 2017-02-20 | End: 2017-02-20

## 2017-02-20 RX ORDER — FENOFIBRATE 145 MG/1
145 TABLET, COATED ORAL
Status: DISCONTINUED | OUTPATIENT
Start: 2017-02-20 | End: 2017-02-20

## 2017-02-20 RX ORDER — FLUTICASONE PROPIONATE 50 MCG
1 SPRAY, SUSPENSION (ML) NASAL
Status: DISCONTINUED | OUTPATIENT
Start: 2017-02-20 | End: 2017-02-20

## 2017-02-20 RX ORDER — SODIUM CHLORIDE 0.9 % (FLUSH) 0.9 %
5-10 SYRINGE (ML) INJECTION AS NEEDED
Status: DISCONTINUED | OUTPATIENT
Start: 2017-02-20 | End: 2017-02-20

## 2017-02-20 RX ORDER — METFORMIN HYDROCHLORIDE 500 MG/1
1000 TABLET ORAL 2 TIMES DAILY WITH MEALS
Status: DISCONTINUED | OUTPATIENT
Start: 2017-02-20 | End: 2017-02-20

## 2017-02-20 RX ORDER — TRAMADOL HYDROCHLORIDE 50 MG/1
50 TABLET ORAL
Status: DISCONTINUED | OUTPATIENT
Start: 2017-02-20 | End: 2017-02-20

## 2017-02-20 RX ORDER — THERA TABS 400 MCG
1 TAB ORAL DAILY
Status: DISCONTINUED | OUTPATIENT
Start: 2017-02-21 | End: 2017-02-20

## 2017-02-20 RX ORDER — HYDRALAZINE HYDROCHLORIDE 20 MG/ML
10 INJECTION INTRAMUSCULAR; INTRAVENOUS
Status: DISCONTINUED | OUTPATIENT
Start: 2017-02-20 | End: 2017-02-20

## 2017-02-20 RX ADMIN — BUMETANIDE 1 MG: 0.25 INJECTION, SOLUTION INTRAMUSCULAR; INTRAVENOUS at 11:00

## 2017-02-20 RX ADMIN — Medication 10 ML: at 13:13

## 2017-02-20 RX ADMIN — ASPIRIN 81 MG CHEWABLE TABLET 324 MG: 81 TABLET CHEWABLE at 09:08

## 2017-02-20 NOTE — H&P
[facility]  [address], [address]  [phone]    Admission History and Physical      NAME:  Tr Hidalgo   :   1940   MRN:  [id number]     PCP:  Roma Covington NP     Date/Time:  2017         Subjective:     CHIEF COMPLAINT: sob     HISTORY OF PRESENT ILLNESS:     Ms. Leodan Mendoza is a 68 y.o.  female who is admitted with SOB. Ms. Leodan Mendoza with NSTEMI, CAD, Takotsubo cardiomyopathy, DM, HTN, hyperlipidemia, fibromyalgia presented to ER c/o SOB. The SOB started 3 days ago and was intermittent and is associated with orthopnea. It is intermittent at rest and on exertion. Denies chest pain associated with SOB. This morning got worse. Denies cough. Recently discharged from [facility] where she was admitted for NSTEMI s/p cardiac cath. Past Medical History   Diagnosis Date    Arthritis     CAD (coronary artery disease)      NSTEMI with PCI LCX on 17    Cardiomyopathy ([facility] [address]      ECHO 2017; EF 35-40%, mid-distal AS/Septal/apical AK     Diabetes mellitus ([facility] [address]     Fibromyalgia     High cholesterol     Hypertension     NSTEMI (non-ST elevated myocardial infarction) ([facility] [address]      NSTEMI with PCI LCX on 17        Past Surgical History   Procedure Laterality Date    Hx orthopaedic      Hx gyn       hysterectomy       Social History   Substance Use Topics    Smoking status: Never Smoker    Smokeless tobacco: Not on file    Alcohol use No        Family History   Problem Relation Age of Onset    Heart Attack Father     High Cholesterol Father         No Known Allergies     Prior to Admission medications    Medication Sig Start Date End Date Taking? Authorizing Provider   lisinopril (PRINIVIL, ZESTRIL) 10 mg tablet 10 mg nightly. Yes Historical Provider   acetaminophen (TYLENOL) 500 mg tablet Take 1,000 mg by mouth every six (6) hours as needed for Pain.    Yes Historical Provider   metFORMIN (GLUCOPHAGE) 1,000 mg tablet Take 1 Tab by mouth two (2) times daily (with meals). 2/19/17  Yes HIREN Garcia   ticagrelor (BRILINTA) 90 mg tablet Take 1 Tab by mouth every twelve (12) hours every twelve (12) hours. Indications: Thrombosis Prevention after PCI 2/16/17  Yes HIREN Garcia   aspirin 81 mg chewable tablet Take 81 mg by mouth nightly. Yes Historical Provider   therapeutic multivitamin (THERAGRAN) tablet Take 1 Tab by mouth daily. Yes Historical Provider   conjugated estrogens (PREMARIN) 0.625 mg tablet Take 0.625 mg by mouth every evening. Yes Historical Provider   pregabalin (LYRICA) 150 mg capsule Take 150 mg by mouth two (2) times a day. Yes Historical Provider   rosuvastatin (CRESTOR) 20 mg tablet TAKE 1 TABLET NIGHTLY  Patient taking differently: Take 20 mg by mouth nightly. 1/23/17  Yes Sloane Patel MD   carvedilol (COREG) 3.125 mg tablet TAKE 1 TABLET TWICE A DAY 10/11/16  Yes Sloane Patel MD   ergocalciferol (VITAMIN D2) 50,000 unit capsule Take 50,000 Units by mouth every Sunday. Yes Historical Provider   traMADol (ULTRAM) 50 mg tablet Take 50 mg by mouth every six (6) hours as needed for Pain. Patient will take 2 tabs every 8 hours as needed (0900, 1600, 2100)   Yes Historical Provider   fluticasone (FLONASE) 50 mcg/actuation nasal spray 1 Spray nightly. Yes Historical Provider   co-enzyme Q-10 (CO Q-10) 50 mg capsule Take 2 Caps by mouth daily. Patient taking differently: Take 100 mg by mouth nightly. 12/12/12  Yes Sloane Patel MD   fenofibric acid (TRILIPIX) 135 mg capsule Take 135 mg by mouth nightly.    Yes Chun Avalos MD         Review of Systems:  (bold if positive, if negative)    Gen:  Eyes:  ENT:  CVS:  Pulm:  dyspneaGI:    :    MS:  Skin:  Psych:  Endo:    Hem:  Renal:    Neuro:            Objective:      VITALS:    Vital signs reviewed; most recent are:    Visit Vitals    BP (!) 186/108    Pulse 84    Temp 97.5 °F (36.4 °C)    Resp 14    Ht 5' 6\" (1.676 m)    Wt 83 kg (183 lb)    SpO2 99%    BMI 29.54 kg/m2     SpO2 Readings from Last 6 Encounters:   02/20/17 99%   02/17/17 97%   08/04/16 97%   07/30/16 98%   05/26/16 97%   11/05/15 95%        No intake or output data in the 24 hours ending 02/20/17 1035         Exam:     Physical Exam:    Gen:  Well-developed, well-nourished, in no acute distress  HEENT:  Pink conjunctivae, PERRL, hearing intact to voice, moist mucous membranes  Neck:  Supple, without masses, thyroid non-tender  Resp:  No accessory muscle use, clear breath sounds without wheezes rales or rhonchi  Card:  No murmurs, normal S1, S2 without thrills, bruits or peripheral edema  Abd:  Soft, non-tender, non-distended, normoactive bowel sounds are present, no palpable organomegaly and no detectable hernias  Lymph:  No cervical or inguinal adenopathy  Musc:  No cyanosis or clubbing  Skin:  No rashes or ulcers, skin turgor is good  Neuro:  Cranial nerves are grossly intact, no focal motor weakness, follows commands appropriately  Psych:  Good insight, oriented to person, place and time, alert       Labs:    Recent Labs      02/20/17   0859   WBC  8.7   HGB  11.9   HCT  38.0   PLT  436*     Recent Labs      02/20/17   0911   NA  141   K  4.2   CL  108   CO2  20*   GLU  127*   BUN  22*   CREA  0.77   CA  9.2     Lab Results   Component Value Date/Time    Glucose (POC) 140 02/17/2017 12:56 PM    Glucose (POC) 171 02/17/2017 11:12 AM     No results for input(s): PH, PCO2, PO2, HCO3, FIO2 in the last 72 hours. No results for input(s): INR in the last 72 hours. No lab exists for component: INREXT    Telemetry reviewed:   PVCs       Assessment/Plan:    1. SOB (shortness of breath) (2/20/2017), likely secondary to acute systolic CHF exacerbation. Pt admitted she took salty diet after discharge from the hospital.  Her last echocardiogram on 2/16/17 EF is 35-40%. Her ProBNP is elevated. Start IV bumex. Continue lisinopril and coreg. Strict I/O and check daily weight. Seen by cardiology. 2.  Recent NSTEMI. S/p cath with PCI/ALETHEA to L Circumflex. Continue ASA, Brillinta, coreg, statin.       3. Hx of CAD/ Takotsubo cardiomyopathy (12/12/2012). Continue ASA and BB as above.       4. DM (diabetes mellitus) ([facility] [address] (12/12/2012). Recent A1C is 5.8. Resume metformin. SSI      5. Dyslipidemia (6/25/2015). Continue crestor      6. HTN (hypertension) (2/15/2017). On lisinopril and coreg. 7.  Fibromyalgia.  Continue lyrica       Previous medical records reviewed     Risk of deterioration: high      Total time spent with patient: [phone] discussed with: Patient, Family, Nursing Staff and >50% of time spent in counseling and coordination of care    Discussed:  Care Plan    Prophylaxis:  Lovenox    Probable Disposition:  Home w/Family           ___________________________________________________    Attending Physician: Waldemar Mccall MD

## 2017-02-20 NOTE — PROGRESS NOTES
Spiritual Care Assessment/Progress Notes    Evelyn [facility] [id number]  [id number]    1940  68 y.o.  female    Patient Telephone Number: [phone] (home)   Faith Affiliation: Non Latter-day   Language: English   Extended Emergency Contact Information  Primary Emergency Contact: Troy Bunch  Address: [address], [address] Phone: [phone]  Relation: Spouse  Secondary Emergency Contact: Emmy Sims Phone: [phone]  Relation: Child   Patient Active Problem List    Diagnosis Date Noted    SOB (shortness of breath) 02/20/2017    NSTEMI (non-ST elevated myocardial infarction) ([facility] [address] 02/17/2017    Chest pain 02/15/2017    HTN (hypertension) 02/15/2017    Dyslipidemia 06/25/2015    Takotsubo cardiomyopathy 12/12/2012    CAD (coronary artery disease) 12/12/2012    DM (diabetes mellitus) ([facility] [address] 12/12/2012        Date: 2/20/2017       Level of Faith/Spiritual Activity:  [x]         Involved in jose antonio tradition/spiritual practice    []         Not involved in jose antonio tradition/spiritual practice  []         Spiritually oriented    []         Claims no spiritual orientation    []         seeking spiritual identity  []         Feels alienated from Lutheran practice/tradition  []         Feels angry about Lutheran practice/tradition  [x]         Spirituality/Lutheran tradition is a resource for coping at this time.   []         Not able to assess due to medical condition    Services Provided Today:  []         crisis intervention    []         reading Scriptures  [x]         spiritual assessment    []         prayer  [x]         empathic listening/emotional support  []         rites and rituals (cite in comments)  []         life review     []         Lutheran support  []         theological development   []         advocacy  []         ethical dialog     []         blessing  [] bereavement support    []         support to family  []         anticipatory grief support   [x]         help with AMD  []         spiritual guidance    []         meditation      Spiritual Care Needs  []         Emotional Support  []         Spiritual/Rastafari Care  []         Loss/Adjustment  []         Advocacy/Referral                /Ethics  [x]         No needs expressed at               this time  []         Other: (note in               comments)  [address]  []         Follow up visits with               pt/family  []         Provide materials  []         Schedule sacraments  []         Contact Community               Clergy  [x]         Follow up as needed  []         Other: (note in               comments)     Comments: Responded to request from In Basket request to assist with Advance Medical Directive. Explained document to patient and patient's  Marlon Keita. Patient will consider the AMD and discuss same with her . However she verbally indicated that she is happy with her next of kin (i.e. her ) for the present having the decision making authority of an AMD. She was instructed to have  paged if and when she chooses to complete the AMD. Patient indicated that she is a [place] and an active member of a [employer]. She derives a certain sense of support from both her jose antonio and [employer]ood[employer]. Chaplains will follow as needed.   Chaplain Blas, MDiv, MS, [facility]  [phone]

## 2017-02-20 NOTE — ED TRIAGE NOTES
Pt. Reports continued shortness of breath since her admission. Denies this is new. States she had it while she was here, just that it is still there. Also reports chest tightness. Denies this is new either, stating it was present on her last admission and has continued.

## 2017-02-20 NOTE — ED NOTES
Pt resting in room, pt alert and oriented x3, denies SOB and chest pain at this time. pt has no new complaints at this time. Call bell within reach, bed in low position and locked. Pt aware to call the RN on the call bell if needs anything. Pt verbalizes understanding.

## 2017-02-20 NOTE — ED NOTES
Bedside and Verbal shift change report given to JOCELYNN Jc (oncoming nurse) by RN Ethan Carroll (offgoing nurse). Report included the following information SBAR, Kardex, ED Summary, MAR, Recent Results, Med Rec Status and Cardiac Rhythm NSR with PVCS.

## 2017-02-20 NOTE — ED NOTES
Pt d/c at this time, and provided rx. Pt's spouse verbalized understanding of follow-up and rx instructions.

## 2017-02-20 NOTE — ED NOTES
Dr. Robin Fulton called at this time about patient's desires.  Dr. Robin Fulton stated he will call Dr. Kervin Plata and make him aware of patient's request.

## 2017-02-20 NOTE — ED PROVIDER NOTES
HPI Comments: 68 y.o. female with past medical history significant for HTN, cardiac catheterization, high cholesterol, arthritis, and fibromyalgia who presents with chief complaint of SOB. Patient was recently admitted to [facility] on 2/15/17 for chest pain and elevated troponin -- cardiac stents placed by Dr. Monika Tam. During admission, patient began complaining of mild SOB. Yesterday evening, breathing had markedly worsened. Patient additionally began complaining of 4/10 chest pain/tightness. Patient continued to exhibit worsening SOB, which prompted today's ED visit -- \"the tightness wasn't as bad as before, but my breathing was never like this. \"  Patient states that SOB worsens when lying flat, and has subsequently exhibited difficulty sleeping at night. Patient denies taking any or her regular or alleviating medications today. Patient denies any fever, chills, nausea, or diaphoresis. There are no other acute medical concerns at this time. Social hx: denies tobacco smoking; +  PCP: Angelita Rodriguez NP    Note written by Rickie Chaudhry, as dictated by Tsering Myles MD 8:55 AM    The history is provided by the patient. Past Medical History:   Diagnosis Date    Arthritis     Fibromyalgia     H/O cardiac catheterization     High cholesterol     Hypertension     Other ill-defined conditions(759.89)      \"previous cardiac history\"       Past Surgical History:   Procedure Laterality Date    Hx orthopaedic      Hx gyn       hysterectomy         Family History:   Problem Relation Age of Onset    Heart Attack Father     High Cholesterol Father        Social History     Social History    Marital status:      Spouse name: N/A    Number of children: N/A    Years of education: N/A     Occupational History    Not on file.      Social History Main Topics    Smoking status: Never Smoker    Smokeless tobacco: Not on file    Alcohol use No    Drug use: No    Sexual activity: Not on file     Other Topics Concern    Not on file     Social History Narrative         ALLERGIES: Review of patient's allergies indicates no known allergies. Review of Systems   Constitutional: Negative for chills, diaphoresis and fever. HENT: Negative for ear pain and sore throat. Eyes: Negative for pain. Respiratory: Positive for chest tightness and shortness of breath. Cardiovascular: Positive for chest pain. Negative for leg swelling. Gastrointestinal: Negative for abdominal pain, nausea and vomiting. Genitourinary: Negative for dysuria and flank pain. Musculoskeletal: Negative for back pain. Skin: Negative for rash. Neurological: Negative for headaches. All other systems reviewed and are negative. Vitals:    02/20/17 0830   BP: (!) 167/98   Pulse: 86   Resp: 16   Temp: 97.5 °F (36.4 °C)   SpO2: 100%   Weight: 83 kg (183 lb)   Height: 5' 6\" (1.676 m)            Physical Exam   Constitutional: She appears well-developed and well-nourished. HENT:   Head: Normocephalic and atraumatic. Mouth/Throat: Oropharynx is clear and moist.   Eyes: Conjunctivae and EOM are normal. Pupils are equal, round, and reactive to light. No scleral icterus. Neck: Neck supple. No tracheal deviation present. Cardiovascular: Normal rate, regular rhythm and normal heart sounds. Exam reveals no gallop. No murmur heard. Pulmonary/Chest: Effort normal and breath sounds normal. No respiratory distress. Abdominal: Soft. She exhibits no distension. There is no tenderness. There is no rebound and no guarding. Genitourinary:   Genitourinary Comments: deferred   Musculoskeletal: She exhibits no edema. Neurological: She is alert. Skin: Skin is warm and dry. Psychiatric: She has a normal mood and affect. Nursing note and vitals reviewed. Note written by Author Rickie Todd, as dictated by Dory Mathis. Kelton Henderson MD 8:55 AM    [facility]  ED Course       Procedures      CONSULT NOTE:  8:57 AM Dory Mathis. Domenica Reynoso MD spoke with Dr. Fay Barajas, Consult for Cardiology. Discussed available diagnostic tests and clinical findings. Dr. Fay Barajas will come see the patient. The patient presents with SOB and chest tightness s/p cardiac stent a few days ago with a differential diagnosis of thrombosed stent, ACS, CHF, pneumonia    ED Course:  Cardiology consulted who stated patient should be observed but didn't feel the patient was infarcting          EKG Per My Interpretation:  Rhythm: normal sinus rhythm;  Rate (approx.): 83; Axis: normal; QRS interval: normal ; ST/T wave: elevated ; Other findings: abnormal ekg. Elevations are in V1 and V2 are new compared to prior    LABORATORY TESTS:  Recent Results (from the past 24 hour(s))   EKG, 12 LEAD, INITIAL    Collection Time: 02/20/17  8:30 AM   Result Value Ref Range    Ventricular Rate 94 BPM    Atrial Rate 94 BPM    P-R Interval 148 ms    QRS Duration 80 ms    Q-T Interval 376 ms    QTC Calculation (Bezet) 470 ms    Calculated P Axis 53 degrees    Calculated R Axis 31 degrees    Calculated T Axis 68 degrees    Diagnosis       Sinus rhythm with frequent premature ventricular complexes  Anteroseptal infarct (cited on or before 20-FEB-2017)  Abnormal ECG  When compared with ECG of 15-FEB-2017 22:35,  premature ventricular complexes are now present  Confirmed by Ijeoma Coffey MD, GINO ([id number]) on 2/20/2017 10:31:10 AM     CBC WITH AUTOMATED DIFF    Collection Time: 02/20/17  8:59 AM   Result Value Ref Range    WBC 8.7 3.6 - 11.0 K/uL    RBC 4.47 3.80 - 5.20 M/uL    HGB 11.9 11.5 - 16.0 g/dL    HCT 38.0 35.0 - 47.0 %    MCV 85.0 80.0 - 99.0 FL    MCH 26.6 26.0 - 34.0 PG    MCHC 31.3 30.0 - 36.5 g/dL    RDW 14.6 (H) 11.5 - 14.5 %    PLATELET 483 (H) 635 - 400 K/uL    NEUTROPHILS 59 32 - 75 %    LYMPHOCYTES 33 12 - 49 %    MONOCYTES 5 5 - 13 %    EOSINOPHILS 3 0 - 7 %    BASOPHILS 0 0 - 1 %    ABS. NEUTROPHILS 5.1 1.8 - 8.0 K/UL    ABS. LYMPHOCYTES 2.9 0.8 - 3.5 K/UL    ABS.  MONOCYTES 0.4 0.0 - 1.0 K/UL    ABS. EOSINOPHILS 0.3 0.0 - 0.4 K/UL    ABS.  BASOPHILS 0.0 0.0 - 0.1 K/UL    RBC COMMENTS NORMOCYTIC, NORMOCHROMIC      WBC COMMENTS REACTIVE LYMPHS     NUCLEATED RBC    Collection Time: 02/20/17  8:59 AM   Result Value Ref Range    NRBC 1.5 (H) 0  WBC    ABSOLUTE NRBC 0.13 (H) 0.00 - 0.01 K/uL    WBC CORRECTED FOR NR ADJUSTED FOR NUCLEATED RBC'S     METABOLIC PANEL, BASIC    Collection Time: 02/20/17  9:11 AM   Result Value Ref Range    Sodium 141 136 - 145 mmol/L    Potassium 4.2 3.5 - 5.1 mmol/L    Chloride 108 97 - 108 mmol/L    CO2 20 (L) 21 - 32 mmol/L    Anion gap 13 5 - 15 mmol/L    Glucose 127 (H) 65 - 100 mg/dL    BUN 22 (H) 6 - 20 MG/DL    Creatinine 0.77 0.55 - 1.02 MG/DL    BUN/Creatinine ratio 29 (H) 12 - 20      GFR est AA >60 >60 ml/min/1.73m2    GFR est non-AA >60 >60 ml/min/1.73m2    Calcium 9.2 8.5 - 10.1 MG/DL   TROPONIN I    Collection Time: 02/20/17  9:11 AM   Result Value Ref Range    Troponin-I, Qt. 0.08 (H) <0.05 ng/mL   PRO-BNP    Collection Time: 02/20/17  9:11 AM   Result Value Ref Range    NT pro-BNP 4291 (H) 0 - 450 PG/ML   POC TROPONIN-I    Collection Time: 02/20/17  9:11 AM   Result Value Ref Range    Troponin-I (POC) 0.07 0.00 - 0.08 ng/mL   CK W/ CKMB & INDEX    Collection Time: 02/20/17  9:11 AM   Result Value Ref Range    CK 88 26 - 192 U/L    CK - MB 1.2 <3.6 NG/ML    CK-MB Index 1.4 0 - 2.5     CK    Collection Time: 02/20/17  9:11 AM   Result Value Ref Range    CK 93 26 - 192 U/L   EKG, 12 LEAD, INITIAL    Collection Time: 02/20/17  9:17 AM   Result Value Ref Range    Ventricular Rate 83 BPM    Atrial Rate 83 BPM    P-R Interval 172 ms    QRS Duration 88 ms    Q-T Interval 390 ms    QTC Calculation (Bezet) 458 ms    Calculated P Axis 56 degrees    Calculated R Axis 27 degrees    Calculated T Axis 77 degrees    Diagnosis       Sinus rhythm with occasional premature ventricular complexes  Septal infarct , age undetermined  Abnormal ECG  When compared with ECG of 20-FEB-2017 08:30,  MANUAL COMPARISON REQUIRED, DATA IS UNCONFIRMED     GLUCOSE, POC    Collection Time: 02/20/17  1:11 PM   Result Value Ref Range    Glucose (POC) 105 (H) 65 - 100 mg/dL    Performed by Leslie Mei        IMAGING RESULTS:  Xr Chest Port    Result Date: 2/20/2017  EXAM:  XR CHEST PORT INDICATION:  sob, since being released from hospital last Friday for stent placement. COMPARISON:  February 15, 2017 FINDINGS: A portable AP radiograph of the chest was obtained at 1040 hours. The patient is on a cardiac monitor. The heart size is normal. The lungs demonstrate new bandlike opacities in the distribution left lower lobe extending inferiorly and partially silhouettes the left hemidiaphragm. This could be due to subsegmental atelectasis or developing pneumonia. Right lung is clear. No pulmonary edema. IMPRESSION: 1. New bandlike opacities at the left lung base in the retrocardiac region. There are some air bronchograms. These findings could be due to atelectasis and/or pneumonia. MEDICATIONS GIVEN:  Medications   acetaminophen (TYLENOL) tablet 1,000 mg (not administered)   aspirin chewable tablet 81 mg (not administered)   carvedilol (COREG) tablet 3.125 mg (not administered)   . PHARMACY TO SUBSTITUTE PER PROTOCOL (not administered)   fluticasone (FLONASE) 50 mcg/actuation nasal spray 1 Spray (not administered)   lisinopril (PRINIVIL, ZESTRIL) tablet 10 mg (not administered)   metFORMIN (GLUCOPHAGE) tablet 1,000 mg (not administered)   pregabalin (LYRICA) capsule 150 mg (not administered)   rosuvastatin (CRESTOR) tablet 20 mg (not administered)   therapeutic multivitamin (THERAGRAN) tablet 1 Tab (not administered)   ticagrelor (BRILINTA) tablet 90 mg (not administered)   traMADol (ULTRAM) tablet 50 mg (not administered)   sodium chloride (NS) flush 5-10 mL ( IntraVENous Canceled Entry 2/20/17 1400)   sodium chloride (NS) flush 5-10 mL (10 mL IntraVENous Given 2/20/17 1313)   enoxaparin (LOVENOX) injection 40 mg (not administered)   bumetanide (BUMEX) injection 1 mg (1 mg IntraVENous Given 2/20/17 1100)   hydrALAZINE (APRESOLINE) 20 mg/mL injection 10 mg (not administered)   insulin lispro (HUMALOG) injection (0 Units SubCUTAneous Held 2/20/17 1311)   glucose chewable tablet 16 g (not administered)   dextrose (D50W) injection syrg 12.5-25 g (not administered)   glucagon (GLUCAGEN) injection 1 mg (not administered)   aspirin chewable tablet 324 mg (324 mg Oral Given 2/20/17 0908)       IMPRESSION:  1. SOB (shortness of breath)        PLAN:  -  aspirin    Disposition:  admit    Condition: stable    Total critical care time spend exclusive of procedures:  0    Bruce Betancur MD

## 2017-02-20 NOTE — ED NOTES
Informed Dr. Farzaneh Church of pt's wishes of staying now. He states he will speak with Dr. Josie Salazar again and call back.

## 2017-02-20 NOTE — PROGRESS NOTES
[facility]: MED RECONCILIATION    Comments/Recommendations:   Patient was alert and oriented  No new allergies reported  Patient provided med list  Stated was going to be stopping premarin in ~3 months  Has a hard time remembering to take ergocalciferol (missed past Sunday dose)  Patient reported taking half lisinopril dose (5mg) previously, but currently on 10mg dose  Patient previously on Fish oil 1,000mg capsules, but has not taken in at least 1 month    Medications added:     · APAP 500mg    Medications removed:    · Fish oil 1000mg capsules once daily    Medications adjusted:    · Lisinopril changed from 5mg to 10mg    Information obtained from: patient, Rx Query    Significant PMH/Disease States:   Past Medical History   Diagnosis Date    Arthritis     CAD (coronary artery disease)      NSTEMI with PCI LCX on 2/16/17    Cardiomyopathy ([facility]      ECHO 2/17/2017; EF 35-40%, mid-distal AS/Septal/apical AK     Diabetes mellitus ([facility]     Fibromyalgia     High cholesterol     Hypertension     NSTEMI (non-ST elevated myocardial infarction) ([facility]      NSTEMI with PCI LCX on 2/16/17     Chief Complaint for this Admission:   Chief Complaint   Patient presents with    Shortness of Breath     Allergies: Review of patient's allergies indicates no known allergies. Prior to Admission Medications:   Prior to Admission Medications   Prescriptions Last Dose Informant Patient Reported? Taking?   acetaminophen (TYLENOL) 500 mg tablet 2/17/2017 at PM  Yes Yes   Sig: Take 1,000 mg by mouth every six (6) hours as needed for Pain. aspirin 81 mg chewable tablet 2/19/2017 at Bedtime  Yes Yes   Sig: Take 81 mg by mouth nightly. carvedilol (COREG) 3.125 mg tablet 2/19/2017 at PM  No Yes   Sig: TAKE 1 TABLET TWICE A DAY   co-enzyme Q-10 (CO Q-10) 50 mg capsule 2/19/2017 at Bedtime  Yes Yes   Sig: Take 100 mg by mouth nightly.    conjugated estrogens (PREMARIN) 0.625 mg tablet 2/19/2017 at Bedtime  Yes Yes   Sig: Take 0.625 mg by mouth every evening.   ergocalciferol (VITAMIN D2) 50,000 unit capsule 2017 at Bedtime  Yes Yes   Sig: Take 50,000 Units by mouth every . fenofibric acid (TRILIPIX) 135 mg capsule 2017 at Bedtime Self Yes Yes   Sig: Take 135 mg by mouth nightly. fluticasone (FLONASE) 50 mcg/actuation nasal spray 2017 at Bedtime Self Yes Yes   [place] by Both Nostrils route nightly. lisinopril (PRINIVIL, ZESTRIL) 10 mg tablet 2017 at Bedtime  Yes Yes   Sig: Take 10 mg by mouth nightly. metFORMIN (GLUCOPHAGE) 1,000 mg tablet 2017 at PM  No Yes   Sig: Take 1 Tab by mouth two (2) times daily (with meals). pregabalin (LYRICA) 150 mg capsule 2017 at PM  Yes Yes   Sig: Take 150 mg by mouth two (2) times a day. rosuvastatin (CRESTOR) 20 mg tablet 2017 at Bedtime  No Yes   Sig: TAKE 1 TABLET NIGHTLY   therapeutic multivitamin (THERAGRAN) tablet 2017 at Bedtime  Yes Yes   Sig: Take 1 Tab by mouth daily. ticagrelor (BRILINTA) 90 mg tablet 2017 at Bedtime  No Yes   Sig: Take 1 Tab by mouth every twelve (12) hours every twelve (12) hours. Indications: Thrombosis Prevention after PCI   traMADol (ULTRAM) 50 mg tablet 2017 at Bedtime  Yes Yes   Sig: Take 50 mg by mouth every six (6) hours as needed for Pain.  Patient will take 2 tabs every 8 hours as needed (0900, 1600, 2100)      Facility-Administered Medications: None       Thank you,  [employer]  Class of 2017

## 2017-02-20 NOTE — ED NOTES
Per Dr. Ap Gomez, states he spoke with Dr. Lars Fenton, who states the increase of troponin levels could be related to her cardiac cath done last week and with her CHF. Does not think they would do another cardiac cath for it. States if pt wants to stay for serial troponin enzymes, pt can stay-otherwise pt is okay to go home. Will update pt/pt's spouse and notify Dr. Ap Gomez of pt's wishes.

## 2017-02-20 NOTE — DISCHARGE SUMMARY
Hospitalist Discharge Summary     Patient ID:    Ana Montoya  [id number]  89 y.o.  1940    Admit date: 2/20/2017    Discharge date and time: 2/20/2017    Admission Diagnoses: chest pain  sob    Chronic Diagnoses:    Problem List as of 2/20/2017  Date Reviewed: 2/20/2017          Codes Class Noted - Resolved    SOB (shortness of breath) ICD-10-CM: R06.02  ICD-9-CM: 786.05  2/20/2017 - Present        Systolic CHF, acute ([facility] ICD-10-CM: I50.21  ICD-9-CM: 428.21, 428.0  2/20/2017 - Present        NSTEMI (non-ST elevated myocardial infarction) ([facility] ICD-10-CM: I21.4  ICD-9-CM: 410.70  2/17/2017 - Present        Chest pain ICD-10-CM: R07.9  ICD-9-CM: 786.50  2/15/2017 - Present        HTN (hypertension) ICD-10-CM: I10  ICD-9-CM: 401.9  2/15/2017 - Present        Dyslipidemia ICD-10-CM: E78.5  ICD-9-CM: 272.4  6/25/2015 - Present        Takotsubo cardiomyopathy ICD-10-CM: I51.81  ICD-9-CM: 429.83  12/12/2012 - Present        CAD (coronary artery disease) ICD-10-CM: I25.10  ICD-9-CM: 414.00  12/12/2012 - Present    Overview Addendum 12/12/2012 12:20 PM by Kera Dillon MD     CATH 12/11/12  Mid LAD: There was a diffuse 30 % stenosis. Distal LAD: There was a diffuse 20 % stenosis. Proximal circumflex: There was a discrete 60 % stenosis. Mid circumflex: There was a diffuse 60 % stenosis. 1st obtuse marginal: There was a discrete 20 % stenosis in the proximal third of the vessel segment. Proximal RCA: There was a discrete 20 % stenosis at a site with no prior intervention. Mid RCA: There was a discrete 20 % stenosis. Distal RCA: There was a 20 % stenosis.                    DM (diabetes mellitus) ([facility] ICD-10-CM: E11.9  ICD-9-CM: 250.00  12/12/2012 - Present        RESOLVED: Elevated troponin ICD-10-CM: R79.89  ICD-9-CM: 790.6  2/15/2017 - 2/17/2017        RESOLVED: NSTEMI (non-ST elevated myocardial infarction) [facility]) ICD-10-CM: I21.4  ICD-9-CM: 410.70  12/10/2012 - 12/12/2012              Discharge Medications:   Current Discharge Medication List      START taking these medications    Details   bumetanide (BUMEX) 0.5 mg tablet Take 2 Tabs by mouth daily. Qty: 30 Tab, Refills: 0         CONTINUE these medications which have NOT CHANGED    Details   lisinopril (PRINIVIL, ZESTRIL) 10 mg tablet Take 10 mg by mouth nightly. acetaminophen (TYLENOL) 500 mg tablet Take 1,000 mg by mouth every six (6) hours as needed for Pain. co-enzyme Q-10 (CO Q-10) 50 mg capsule Take 100 mg by mouth nightly. metFORMIN (GLUCOPHAGE) 1,000 mg tablet Take 1 Tab by mouth two (2) times daily (with meals). Qty: 1 Tab, Refills: 1      ticagrelor (BRILINTA) 90 mg tablet Take 1 Tab by mouth every twelve (12) hours every twelve (12) hours. Indications: Thrombosis Prevention after PCI  Qty: 60 Tab, Refills: 11      aspirin 81 mg chewable tablet Take 81 mg by mouth nightly. therapeutic multivitamin (THERAGRAN) tablet Take 1 Tab by mouth daily. conjugated estrogens (PREMARIN) 0.625 mg tablet Take 0.625 mg by mouth every evening. pregabalin (LYRICA) 150 mg capsule Take 150 mg by mouth two (2) times a day. rosuvastatin (CRESTOR) 20 mg tablet TAKE 1 TABLET NIGHTLY  Qty: 90 Tab, Refills: 0    Associated Diagnoses: Dyslipidemia      carvedilol (COREG) 3.125 mg tablet TAKE 1 TABLET TWICE A DAY  Qty: 180 Tab, Refills: 1      ergocalciferol (VITAMIN D2) 50,000 unit capsule Take 50,000 Units by mouth every Sunday. traMADol (ULTRAM) 50 mg tablet Take 50 mg by mouth every six (6) hours as needed for Pain. Patient will take 2 tabs every 8 hours as needed (0900, 1600, 2100)      fluticasone (FLONASE) 50 mcg/actuation nasal spray 1 [place] by Both Nostrils route nightly. fenofibric acid (TRILIPIX) 135 mg capsule Take 135 mg by mouth nightly. Follow up Care:    [facility], NP in 1-2 weeks  2. Dr Gian Mccoy on 3/2/17    Diet:  Cardiac Diet and Diabetic Diet    Disposition:  Home.     Advanced Directive:    Discharge Exam:  See today's note. CONSULTATIONS: Cardiology    Significant Diagnostic Studies:   Recent Labs      02/20/17   0859   WBC  8.7   HGB  11.9   HCT  38.0   PLT  436*     Recent Labs      02/20/17   0911   NA  141   K  4.2   CL  108   CO2  20*   BUN  22*   CREA  0.77   GLU  127*   CA  9.2     No results for input(s): SGOT, GPT, ALT, AP, TBIL, TBILI, TP, ALB, GLOB, GGT, AML, LPSE in the last 72 hours. No lab exists for component: AMYP, HLPSE  No results for input(s): INR, PTP, APTT in the last 72 hours. No lab exists for component: INREXT   No results for input(s): FE, TIBC, PSAT, FERR in the last 72 hours. No results for input(s): PH, PCO2, PO2 in the last 72 hours. Recent Labs      02/20/17   0911   CPK  88  93   CKMB  1.2     Lab Results   Component Value Date/Time    Glucose (POC) 105 02/20/2017 01:11 PM    Glucose (POC) 140 02/17/2017 12:56 PM    Glucose (POC) 171 02/17/2017 11:12 AM    Glucose (POC) 105 02/17/2017 07:36 AM    Glucose (POC) 172 02/16/2017 09:06 PM             HOSPITAL COURSE & TREATMENT RENDERED:   1. SOB (shortness of breath) (2/20/2017), likely secondary to acute systolic CHF exacerbation. proBNP is elevated. Pt admitted she took salty diet after discharge from the hospital. Her last echocardiogram on 2/16/17 EF is 35-40%. After a dose of bumex IV had good output and clinically felt better. Wants to go home. Continue lisinopril and coreg. Has appointment with cardiology on 3/2.       2. Recent NSTEMI. S/p cath with PCI/ALETHEA to L Circumflex. Continue ASA, Brillinta, coreg, statin.       3. Hx of CAD/ Takotsubo cardiomyopathy (12/12/2012). Continue ASA and BB as above.       4. DM (diabetes mellitus) ([facility] (12/12/2012). Recent A1C is 5.8. Resume metformin. SSI      5. Dyslipidemia (6/25/2015). Continue crestor      6. HTN (hypertension) (2/15/2017). On lisinopril and coreg.     7. Fibromyalgia.  Continue lyrica    Pt is discharged in improved condition Signed:  Luz Fontanez MD  2/20/2017  3:56 PM

## 2017-02-20 NOTE — Clinical Note
Patient Class[de-identified] Observation [983] Type of Bed: Telemetry [19] Reason for Observation: chest pain Admitting Physician: Gloria Pollock Attending Physician: Gloria Pollock Diagnosis: chest pain

## 2017-02-20 NOTE — ED NOTES
Report received from Kori Davison, [facility]. Assumed care of pt. Bed locked and in low position with call bell within reach. Using AIDET-Introduced self as Primary RN and plan discussed with pt with understanding was verbalized. Pt denies additional complaints at this time. White board updated with this nurse's name. Patient advised that medical information will be discussed and it is their own responsibility to tell nurse if such conversation should not take place in the presence of visitors. Pt verbalizes understanding. Pt's spouse at bedside. Pt on monitor x 3 with NAD. NSR with PVCs noted.

## 2017-02-20 NOTE — DISCHARGE INSTRUCTIONS
ACUTE DIAGNOSES:  chest pain  sob    CHRONIC MEDICAL DIAGNOSES:  Problem List as of 2/20/2017  Date Reviewed: 2/20/2017          Codes Class Noted - Resolved    SOB (shortness of breath) ICD-10-CM: R06.02  ICD-9-CM: 786.05  2/20/2017 - Present        Systolic CHF, acute ([facility] ICD-10-CM: I50.21  ICD-9-CM: 428.21, 428.0  2/20/2017 - Present        NSTEMI (non-ST elevated myocardial infarction) ([facility] ICD-10-CM: I21.4  ICD-9-CM: 410.70  2/17/2017 - Present        Chest pain ICD-10-CM: R07.9  ICD-9-CM: 786.50  2/15/2017 - Present        HTN (hypertension) ICD-10-CM: I10  ICD-9-CM: 401.9  2/15/2017 - Present        Dyslipidemia ICD-10-CM: E78.5  ICD-9-CM: 272.4  6/25/2015 - Present        Takotsubo cardiomyopathy ICD-10-CM: I51.81  ICD-9-CM: 429.83  12/12/2012 - Present        CAD (coronary artery disease) ICD-10-CM: I25.10  ICD-9-CM: 414.00  12/12/2012 - Present    Overview Addendum 12/12/2012 12:20 PM by Amalia Urbano MD     CATH 12/11/12  Mid LAD: There was a diffuse 30 % stenosis. Distal LAD: There was a diffuse 20 % stenosis. Proximal circumflex: There was a discrete 60 % stenosis. Mid circumflex: There was a diffuse 60 % stenosis. 1st obtuse marginal: There was a discrete 20 % stenosis in the proximal third of the vessel segment. Proximal RCA: There was a discrete 20 % stenosis at a site with no prior intervention. Mid RCA: There was a discrete 20 % stenosis. Distal RCA: There was a 20 % stenosis. DM (diabetes mellitus) ([facility] ICD-10-CM: E11.9  ICD-9-CM: 250.00  12/12/2012 - Present        RESOLVED: Elevated troponin ICD-10-CM: R79.89  ICD-9-CM: 790.6  2/15/2017 - 2/17/2017        RESOLVED: NSTEMI (non-ST elevated myocardial infarction) [facility]) ICD-10-CM: I21.4  ICD-9-CM: 410.70  12/10/2012 - 12/12/2012              DISCHARGE MEDICATIONS:          · It is important that you take the medication exactly as they are prescribed.    · Keep your medication in the bottles provided by the pharmacist and keep a list of the medication names, dosages, and times to be taken in your wallet. · Do not take other medications without consulting your doctor. DIET:  Cardiac Diet and Diabetic Diet    ACTIVITY: Activity as tolerated    ADDITIONAL INFORMATION: If you experience any of the following symptoms then please call your primary care physician or return to the emergency room if you cannot get hold of your doctor: Fever, chills, nausea, vomiting, diarrhea, change in mentation, falling, bleeding, shortness of breath. FOLLOW UP CARE:  Dr. Rachelle Lee, NP  you are to call and set up an appointment to see them in 2 weeks. Follow-up with Dr Steve Stoll on 3/2/17      Information obtained by :  I understand that if any problems occur once I am at home I am to contact my physician. I understand and acknowledge receipt of the instructions indicated above.                                                                                                                                            Physician's or R.N.'s Signature                                                                  Date/Time                                                                                                                                              Patient or Representative Signature                                                          Date/Time

## 2017-02-20 NOTE — ACP (ADVANCE CARE PLANNING)
Responded to request from In Basket request to assist with Advance Medical Directive. Explained document to patient and patient's  Hamida Olivas. Patient will consider the AMD and discuss same with her .  However she verbally indicated that she is happy with her next of kin (i.e. her ) for the present having the decision making authority of an AMD. She was instructed to have  paged if and when she chooses to complete the AMD.  Chaplain Granados MDiv, MS, [place] [id number] PRAY ([phone])

## 2017-02-20 NOTE — ED NOTES
Per Dr. Hill García, he spoke with Dr. Elena Lopez, cardiologist, and he was okay with pt being discharged if she wanted to leave and to follow up as directed. Spoke with pt and she stated that if she was moved to a warmer ED room, she would stay. Spouse was telling her he would rather her stay then leave and something happen to her in the middle of the night. Pt reports she would stay if she could be in a warmer room. This RN told pt we would try to move her into a warmer room until she gets admitted. Will notify Dr. Hill García.

## 2017-02-20 NOTE — PROGRESS NOTES
Spoke to Dr. Antonio Quezada. Ms. Josr Leung feels better after Bumex and wants to go home. I think main problem was Acute CHF (proBNP was elevated). Would recheck troponin now and if OK, then OK for DC home on Bumex 1 mg PO daily. Will see her in clinic soon (3/2/17). Trop 0.11 - slight elevation more likely due to CHF/recent MI/cardiomyopaty rather than STEMI.

## 2017-02-21 ENCOUNTER — TELEPHONE (OUTPATIENT)
Dept: CARDIOLOGY CLINIC | Age: 77
End: 2017-02-21

## 2017-02-21 ENCOUNTER — HOME CARE VISIT (OUTPATIENT)
Dept: HOME HEALTH SERVICES | Facility: HOME HEALTH | Age: 77
End: 2017-02-21

## 2017-02-21 NOTE — TELEPHONE ENCOUNTER
Samuel Childress, HH nurse called and stated pt cancelled hh visit for today. Mrs. Childress can be reached at [phone]. Thanks!

## 2017-02-21 NOTE — TELEPHONE ENCOUNTER
[facility] and stated pt cancelled hh visit for today. Mrs. Rolf Nieves can be reached at [phone]. Thanks!

## 2017-02-21 NOTE — TELEPHONE ENCOUNTER
Mrs. Montenegro is no longer able to take Brilinta, she had a reaction to it which sent her to the ER and she saw Dr. Sosa where he advised her to not take it any longer to contact our office to have it changed.  She can be reached at [phone].      Thank you, Mary

## 2017-02-21 NOTE — ED NOTES
Pt states she would rather go home and sleep in her own bed. Dr. Vidal Ziegler notified and verbalized understanding.

## 2017-02-21 NOTE — TELEPHONE ENCOUNTER
Mrs. Nithin Montanez is no longer able to take Brilinta, she had a reaction to it which sent her to the ER and she saw Dr. Travis Beck where he advised her to not take it any longer to contact our office to have it changed. She can be reached at [phone].       Thank you, Shannon Marquis

## 2017-02-21 NOTE — ED NOTES
Pt given discharge instructions by Federica Mahan RN. Pt given prescription for Bumex from Dr. Hollis Simpson by this RN and instructed on use with understanding being verbalized. Pt ambulatory off the unit with steady gait with spouse and in NAD. Pt declined using a w/c.

## 2017-02-21 NOTE — NURSE NAVIGATOR
EMR reviewed. PCP follow up scheduled for 2/22/2017 at 10:15am. Details placed on patient's discharge instructions.     Heena Dodson RN Nurse Navigator [facility] 22

## 2017-02-23 ENCOUNTER — TELEPHONE (OUTPATIENT)
Dept: CARDIOLOGY CLINIC | Age: 77
End: 2017-02-23

## 2017-02-23 NOTE — TELEPHONE ENCOUNTER
Patient stated that she needs an rx for a blood thinner. Please give her a call at [phone].  Thank you

## 2017-02-24 ENCOUNTER — TELEPHONE (OUTPATIENT)
Dept: CARDIOLOGY CLINIC | Age: 77
End: 2017-02-24

## 2017-02-24 RX ORDER — CLOPIDOGREL BISULFATE 75 MG/1
75 TABLET ORAL DAILY
Qty: 34 TAB | Refills: 3 | Status: SHIPPED | OUTPATIENT
Start: 2017-02-24 | End: 2017-06-05 | Stop reason: SDUPTHER

## 2017-02-24 NOTE — TELEPHONE ENCOUNTER
Spoke with Patient,    Patient reports she will no longer take her Brilinta, she believes this is the culprit of her SOB. She reports after stopping the Brilinta she felt better. Will reach out to Dr. Nikky Etienne for f/u.

## 2017-02-24 NOTE — TELEPHONE ENCOUNTER
Requested Prescriptions     Signed Prescriptions Disp Refills    clopidogrel (PLAVIX) 75 mg tab 34 Tab 3     Sig: Take 1 Tab by mouth daily. Authorizing Provider: Sherrie Cook     Ordering User: MD Serafin Hernandez Dr., LPN        Cc: Lori Quinones RN       Caller: Unspecified Aubrey Wilkersno,  9:19 AM)                     [address] [place] [address] and switch to Plavix. Write for plavix 75 mg.   The first day of Plavix she should take 4 tablets (for total 300 mg).  Then starting the next day, take 75 mg daily.

## 2017-02-27 NOTE — TELEPHONE ENCOUNTER
[address] [facility] and switch to Plavix. Write for plavix 75 mg.   The first day of Plavix she should take 4 tablets (for total 300 mg).  Then starting the next day, take 75 mg daily. Please make sure patient understands the instructions for plavix clearly.      Thanks,   [employer]

## 2017-02-28 ENCOUNTER — DOCUMENTATION ONLY (OUTPATIENT)
Dept: CARDIOLOGY CLINIC | Age: 77
End: 2017-02-28

## 2017-02-28 NOTE — PROGRESS NOTES
Received notification from [address] [place] [address] that patient was referred for rehab on 2/17/17 while in [address] [place] [address]. They contacted the patient, however due to lack of transportation she cannot make the time commitment to the program. Zenaida Mendoza was given contact information for Rivka Monteiro RN should her situation change. Information was forwarded to Dr. Lay Morris.

## 2017-03-02 ENCOUNTER — CLINICAL SUPPORT (OUTPATIENT)
Dept: CARDIOLOGY CLINIC | Age: 77
End: 2017-03-02

## 2017-03-02 ENCOUNTER — OFFICE VISIT (OUTPATIENT)
Dept: CARDIOLOGY CLINIC | Age: 77
End: 2017-03-02

## 2017-03-02 ENCOUNTER — DOCUMENTATION ONLY (OUTPATIENT)
Dept: CARDIOLOGY CLINIC | Age: 77
End: 2017-03-02

## 2017-03-02 VITALS
WEIGHT: 179 LBS | HEIGHT: 66 IN | DIASTOLIC BLOOD PRESSURE: 62 MMHG | RESPIRATION RATE: 18 BRPM | HEART RATE: 74 BPM | OXYGEN SATURATION: 96 % | BODY MASS INDEX: 28.77 KG/M2 | SYSTOLIC BLOOD PRESSURE: 120 MMHG

## 2017-03-02 DIAGNOSIS — I25.10 CORONARY ARTERY DISEASE DUE TO LIPID RICH PLAQUE: ICD-10-CM

## 2017-03-02 DIAGNOSIS — I51.81 TAKOTSUBO CARDIOMYOPATHY: ICD-10-CM

## 2017-03-02 DIAGNOSIS — I50.21 SYSTOLIC CHF, ACUTE (HCC): ICD-10-CM

## 2017-03-02 DIAGNOSIS — R09.89 CAROTID BRUIT, UNSPECIFIED LATERALITY: Primary | ICD-10-CM

## 2017-03-02 DIAGNOSIS — I25.83 CORONARY ARTERY DISEASE DUE TO LIPID RICH PLAQUE: ICD-10-CM

## 2017-03-02 DIAGNOSIS — I21.4 NSTEMI (NON-ST ELEVATED MYOCARDIAL INFARCTION) (HCC): Primary | ICD-10-CM

## 2017-03-02 DIAGNOSIS — I65.23 CAROTID STENOSIS, BILATERAL: ICD-10-CM

## 2017-03-02 RX ORDER — GLUCOSAM/CHONDRO/HERB 149/HYAL 750-100 MG
1 TABLET ORAL DAILY
COMMUNITY

## 2017-03-02 RX ORDER — BUMETANIDE 0.5 MG/1
0.5 TABLET ORAL DAILY
Qty: 30 TAB | Refills: 2 | Status: SHIPPED | OUTPATIENT
Start: 2017-03-02 | End: 2017-06-05 | Stop reason: SDUPTHER

## 2017-03-02 NOTE — MR AVS SNAPSHOT
Visit Information Date & Time Provider Department Dept. Phone Encounter #  
 3/2/2017  9:40 AM Kapil Lopez MD [facility] [phone] [id number] Your Appointments 6/1/2017 10:40 AM  
ESTABLISHED PATIENT with Kapil Lopez MD  
[facility] ([address]) Appt Note: 1yr f/u  
 [facility] 600 1007 [id number] [place] [address] Upcoming Health Maintenance Date Due  
 FOOT EXAM Q1 7/3/1950 MICROALBUMIN Q1 7/3/1950 EYE EXAM RETINAL OR DILATED Q1 7/3/1950 ZOSTER VACCINE AGE 60> 7/3/2000 GLAUCOMA SCREENING Q2Y 7/3/2005 OSTEOPOROSIS SCREENING (DEXA) 7/3/2005 Pneumococcal 65+ Low/Medium Risk (1 of 2 - PCV13) 7/3/2005 MEDICARE YEARLY EXAM 7/3/2005 INFLUENZA AGE 9 TO ADULT 8/1/2016 HEMOGLOBIN A1C Q6M 8/17/2017 LIPID PANEL Q1 2/17/2018 DTaP/Tdap/Td series (2 - Td) 7/30/2026 Allergies as of 3/2/2017  Review Complete On: 3/2/2017 By: Kapil Lopez MD  
 No Known Allergies Current Immunizations  Never Reviewed Name Date Tdap 7/30/2016  8:33 PM  
  
 Not reviewed this visit You Were Diagnosed With   
  
 Codes Comments NSTEMI (non-ST elevated myocardial infarction) ([facility]    -  Primary ICD-10-CM: I21.4 ICD-9-CM: 410.70 Systolic CHF, acute ([facility]     ICD-10-CM: I50.21 ICD-9-CM: 428.21, 428.0 Takotsubo cardiomyopathy     ICD-10-CM: I51.81 
ICD-9-CM: 429.83 Coronary artery disease due to lipid rich plaque     ICD-10-CM: I25.10, I25.83 ICD-9-CM: 414.00, 414.3 Vitals BP  
  
  
  
  
  
 120/62 (BP 1 Location: Left arm, BP Patient Position: Sitting) Vitals History BMI and BSA Data Body Mass Index Body Surface Area  
 28.89 kg/m 2 1.94 m 2 Preferred Pharmacy Pharmacy Name Phone [id number] [address] [facility] [phone] Your Updated Medication List  
  
   
This list is accurate as of: 3/2/17 10:45 AM.  Always use your most recent med list.  
  
  
  
  
 acetaminophen 500 mg tablet Commonly known as:  TYLENOL Take 1,000 mg by mouth every six (6) hours as needed for Pain. aspirin 81 mg chewable tablet Take 81 mg by mouth nightly. bumetanide 0.5 mg tablet Commonly known as:  Amaro Stephen Take 1 Tab by mouth daily. carvedilol 3.125 mg tablet Commonly known as:  COREG  
TAKE 1 TABLET TWICE A DAY  
  
 clopidogrel 75 mg Tab Commonly known as:  PLAVIX Take 1 Tab by mouth daily. co-enzyme Q-10 50 mg capsule Commonly known as:  CO Q-10 Take 100 mg by mouth nightly. FISH OIL 1,000 mg (120 mg-180 mg) Cap Generic drug:  Omega-3-DHA-EPA-Fish Oil Take 1 Cap by mouth daily. FLONASE 50 mcg/actuation nasal spray Generic drug:  fluticasone 1 [place] by Both Nostrils route nightly. lisinopril 10 mg tablet Commonly known as:  Hilaria Zeke Take 10 mg by mouth nightly. metFORMIN 1,000 mg tablet Commonly known as:  GLUCOPHAGE Take 1 Tab by mouth two (2) times daily (with meals). pregabalin 150 mg capsule Commonly known as:  Henry Song Take 150 mg by mouth two (2) times a day. PREMARIN 0.625 mg tablet Generic drug:  conjugated estrogens Take 0.625 mg by mouth every evening. rosuvastatin 20 mg tablet Commonly known as:  CRESTOR  
TAKE 1 TABLET NIGHTLY  
  
 therapeutic multivitamin tablet Commonly known as:  [facility] Take 1 Tab by mouth daily. traMADol 50 mg tablet Commonly known as:  ULTRAM  
Take 50 mg by mouth every six (6) hours as needed for Pain. Patient will take 2 tabs every 8 hours as needed (0900, 1600, 2100) TRILIPIX 135 mg capsule Generic drug:  fenofibric acid Take 135 mg by mouth nightly. VITAMIN D2 50,000 unit capsule Generic drug:  ergocalciferol Take 50,000 Units by mouth every Sunday. Patient Instructions Decrease Bumex to 0.5 mg by mouth once daily. New prescription sent to [facility] on file. Referral to Cardiac Rehab. Follow up with me in 6 months. Introducing [facility]! [employer] introduces [employer] patient portal. Now you can access parts of your medical record, email your doctor's office, and request medication refills online. 1. In your internet browser, go to [email or web address] 2. Click on the First Time User? Click Here link in the Sign In box. You will see the New Member Sign Up page. 3. Enter your [employer] Access Code exactly as it appears below. You will not need to use this code after youve completed the sign-up process. If you do not sign up before the expiration date, you must request a new code. · [employer] Access Code: [email or web address] Expires: 5/16/2017  3:58 PM 
 
4. Enter the last four digits of your Social Security Number (xxxx) and Date of Birth (mm/dd/yyyy) as indicated and click Submit. You will be taken to the next sign-up page. 5. Create a [employer] ID. This will be your [employer] login ID and cannot be changed, so think of one that is secure and easy to remember. 6. Create a [employer] password. You can change your password at any time. 7. Enter your Password Reset Question and Answer. This can be used at a later time if you forget your password. 8. Enter your e-mail address. You will receive e-mail notification when new information is available in [address] 9. Click Sign Up. You can now view and download portions of your medical record. 10. Click the Download Summary menu link to download a portable copy of your medical information. If you have questions, please visit the Frequently Asked Questions section of the [employer] website.  Remember, [employer] is NOT to be used for urgent needs. For medical emergencies, dial 911. Now available from your iPhone and Android! Please provide this summary of care documentation to your next provider. Your primary care clinician is listed as Chase Reno. If you have any questions after today's visit, please call [phone].

## 2017-03-02 NOTE — PROCEDURES
[facility]  *** FINAL REPORT ***    Name: Maria L Tabor  MRN: [id number]       Outpatient  : 1940  HIS Order #: [id number]  [address] Visit #: [id number]  Date: 02 Mar 2017    TYPE OF TEST: Cerebrovascular Duplex    REASON FOR TEST  Carotid bruit    Right Carotid:-             Proximal               Mid                 Distal  cm/s  Systolic  Diastolic  Systolic  Diastolic  Systolic  Diastolic  CCA:     46.5      11.0                            77.0      14.0  Bulb:    45.0      11.0  ECA:     70.0       0.0  ICA:     64.0      12.0       57.0      11.0       61.0      17.0  ICA/CCA:  0.8       0.9    ICA Stenosis: Unknown    Right Vertebral:-  Finding: Antegrade  Sys:       56.0  Aida:       12.0    Right Subclavian:    Left Carotid:-            Proximal                Mid                 Distal  cm/s  Systolic  Diastolic  Systolic  Diastolic  Systolic  Diastolic  CCA:     42.8      14.0                            66.0      17.0  Bulb:    52.0      11.0  ECA:     53.0       0.0  ICA:     45.0      13.0       52.0      14.0       65.0      18.0  ICA/CCA:  0.7       0.8    ICA Stenosis: Unknown    Left Vertebral:-  Finding: Antegrade  Sys:       28.0  Aida:        8.0    Left Subclavian:    INTERPRETATION/FINDINGS  PROCEDURE:  Evaluation of the extracranial cerebrovascular arteries  with ultrasound (B-mode imaging, pulsed Doppler, color Doppler). Includes the common carotid, internal carotid, external carotid, and  vertebral arteries. FINDINGS:  Mild heterogeneous plaque is exhibited within the proximal  internal carotid arteries bilaterally. Only a minimal filling defect  is seen on color flow imaging and peak systolic velocities are normal.    IMPRESSION: Findings are consistent with 10-49% stenosis of the right  internal carotid and 10-49% stenosis of the left internal carotid. Vertebrals are patent with antegrade flow. Brachial pressures are  symmetric.   COMMENTS: Preliminary findings were discussed with the patient at an  associated office visit. ADDITIONAL COMMENTS    I have personally reviewed the data relevant to the interpretation of  this  study.     TECHNOLOGIST: YAJAIRA Hayes  Signed: 03/02/2017 09:52 AM    PHYSICIAN: Ciro Laws MD  Signed: 03/03/2017 10:27 AM

## 2017-03-02 NOTE — PROGRESS NOTES
Office Follow-up    NAME: Jia Barcenas   :  1940  MRM:  [id number]    Date:  3/2/2017            Assessment:     Problem List  Date Reviewed: 3/2/2017          Codes Class Noted    SOB (shortness of breath) ICD-10-CM: R06.02  ICD-9-CM: 786.05  6/15/8051        Systolic CHF, acute ([facility] 75.) ICD-10-CM: I50.21  ICD-9-CM: 428.21, 428.0  2017        NSTEMI (non-ST elevated myocardial infarction) ([facility].) ICD-10-CM: I21.4  ICD-9-CM: 410.70  2017        Chest pain ICD-10-CM: R07.9  ICD-9-CM: 786.50  2/15/2017        HTN (hypertension) ICD-10-CM: I10  ICD-9-CM: 401.9  2/15/2017        Dyslipidemia ICD-10-CM: E78.5  ICD-9-CM: 272.4  2015        Takotsubo cardiomyopathy ICD-10-CM: I51.81  ICD-9-CM: 429.83  2012        CAD (coronary artery disease) ICD-10-CM: I25.10  ICD-9-CM: 414.00  2012    Overview Addendum 2012 12:20 PM by Valeria Ocampo MD     CATH 12  Mid LAD: There was a diffuse 30 % stenosis. Distal LAD: There was a diffuse 20 % stenosis. Proximal circumflex: There was a discrete 60 % stenosis. Mid circumflex: There was a diffuse 60 % stenosis. 1st obtuse marginal: There was a discrete 20 % stenosis in the proximal third of the vessel segment. Proximal RCA: There was a discrete 20 % stenosis at a site with no prior intervention. Mid RCA: There was a discrete 20 % stenosis. Distal RCA: There was a 20 % stenosis. DM (diabetes mellitus) ([facility] 75.) ICD-10-CM: E11.9  ICD-9-CM: 250.00  2012                 Plan:     1. CAD/ LCX ALETHEA/ Residual LAD and OM disease: Continue ASA, Plavix and statins. 2. Dyslipidemia: All parameters including LDL, TG, LDLp, Sm LDL, HsCRP have significantly improved on Crestor, Trilipix and Fish oil. Will continue these with diet discipline. 3. HTN: BP controlled. Continue Coreg. 4. Takotsubo CM/ Chronic Systolic CHF: Decrease Bumex to 0.5 mg po daily. Vj Standard changed to Plavix. 5. DM:  Continue strict DM control. 6. Strongly recommend cardiac rehab to be performed at [facility].   7. FU in 6 months. ER records, notes and diagnostic data personally reviewed. Subjective:     Edilberto Charlton, a 68y.o. year-old who presents for followup after recent cath on 2/16/17 and received a stent to LCX. She is doing well. Did not go to rehab due to lack of transportation. She was admitted to ER after cardiac cath with symptoms of SOB and was thought to be due to [facility] which was changed to Plavix and strated on Bumex 0.5 x 2 tab daily. Exam:     Physical Exam:  Visit Vitals    /62 (BP 1 Location: Left arm, BP Patient Position: Sitting)    Pulse 74    Resp 18    Ht 5' 6\" (1.676 m)    Wt 179 lb (81.2 kg)    SpO2 96%    BMI 28.89 kg/m2     General appearance - alert, well appearing, and in no distress  Mental status - affect appropriate to mood  Eyes - sclera anicteric, moist mucous membranes  Neck - supple, no significant adenopathy  Chest - clear to auscultation, no wheezes, rales or rhonchi  Heart - normal rate, regular rhythm, normal S1, S2, no murmurs, rubs, clicks or gallops  Extremities - peripheral pulses normal, no pedal edema    Medications:     Current Outpatient Prescriptions   Medication Sig    Omega-3-DHA-EPA-Fish Oil (FISH OIL) 1,000 mg (120 mg-180 mg) cap Take 1 Cap by mouth daily.  clopidogrel (PLAVIX) 75 mg tab Take 1 Tab by mouth daily.  lisinopril (PRINIVIL, ZESTRIL) 10 mg tablet Take 10 mg by mouth nightly.  co-enzyme Q-10 (CO Q-10) 50 mg capsule Take 100 mg by mouth nightly.  bumetanide (BUMEX) 0.5 mg tablet Take 2 Tabs by mouth daily.  metFORMIN (GLUCOPHAGE) 1,000 mg tablet Take 1 Tab by mouth two (2) times daily (with meals).  aspirin 81 mg chewable tablet Take 81 mg by mouth nightly.  therapeutic multivitamin (THERAGRAN) tablet Take 1 Tab by mouth daily.  conjugated estrogens (PREMARIN) 0.625 mg tablet Take 0.625 mg by mouth every evening.     pregabalin (LYRICA) 150 mg capsule Take 150 mg by mouth two (2) times a day.  rosuvastatin (CRESTOR) 20 mg tablet TAKE 1 TABLET NIGHTLY    carvedilol (COREG) 3.125 mg tablet TAKE 1 TABLET TWICE A DAY    ergocalciferol (VITAMIN D2) 50,000 unit capsule Take 50,000 Units by mouth every Sunday.  traMADol (ULTRAM) 50 mg tablet Take 50 mg by mouth every six (6) hours as needed for Pain. Patient will take 2 tabs every 8 hours as needed (0900, 1600, 2100)    fluticasone (FLONASE) 50 mcg/actuation nasal spray 1 [place] by Both Nostrils route nightly.  fenofibric acid (TRILIPIX) 135 mg capsule Take 135 mg by mouth nightly.  acetaminophen (TYLENOL) 500 mg tablet Take 1,000 mg by mouth every six (6) hours as needed for Pain. No current facility-administered medications for this visit. Diagnostic Data Review:     EKG:normal sinus rhythm, normal ST segment, normal axis, normal intervals. ECHO 2012: EF 40%, Takotsubo Cardiomyopathy. Mild LVH  ECHO 3/22/13: Normal LV function. ECHO 2/17/2017; EF 35-40%, mid-distal AS/Septal/apical AK       STRESS: Nuc 12/4/13: Normal perfusion, LVEF- 80%.     CATH: :12/11/12 : LAD: m30%, d: 20%, LCX: p60%, m:60%, OM1: 20%, RCA: p20%, m20%, d20%   CATH 2/16/2017: LCX 70% FFR + 0.79   --- s/p ALETHEA (2.25x23)-> LCX      Lab Review:     Lab Results   Component Value Date/Time    Cholesterol, total 116 02/17/2017 04:34 AM    HDL Cholesterol 54 02/17/2017 04:34 AM    LDL, calculated 34.2 02/17/2017 04:34 AM    Triglyceride 139 02/17/2017 04:34 AM    CHOL/HDL Ratio 2.1 02/17/2017 04:34 AM     Lab Results   Component Value Date/Time    Creatinine 0.77 02/20/2017 09:11 AM     Lab Results   Component Value Date/Time    BUN 22 02/20/2017 09:11 AM     Lab Results   Component Value Date/Time    Potassium 4.2 02/20/2017 09:11 AM     Lab Results   Component Value Date/Time    Hemoglobin A1c 5.8 02/17/2017 04:34 AM     Lab Results   Component Value Date/Time    HGB 11.9 02/20/2017 08:59 AM     Lab Results   Component Value Date/Time    PLATELET 520 16/67/1293 08:59 AM     No results for input(s): CPK, CKMB, TROIQ in the last 72 hours. No lab exists for component: CKQMB, CPKMB             ___________________________________________________    Risa Simpson.  Jennifer Hyde MD, [facility]

## 2017-03-02 NOTE — PATIENT INSTRUCTIONS
Decrease Bumex to 0.5 mg by mouth once daily. New prescription sent to [facility] on file. Referral to Cardiac Rehab. Follow up with me in 6 months.

## 2017-03-10 ENCOUNTER — TELEPHONE (OUTPATIENT)
Dept: CARDIOLOGY CLINIC | Age: 77
End: 2017-03-10

## 2017-03-10 NOTE — LETTER
3/10/2017 3:53 PM 
 
Ms. Armando Grad [address] [place] [address] Drive [address] To Whom It May Concern: This is to verify that Mr. Leodan Mendoza needs to be off from work on Mondays and Fridays for the next 12 weeks in order to assist in the care of his wife. Thank you for your prompt attention to this matter. Sincerely, Carlos Manuel Hernández MD

## 2017-03-10 NOTE — TELEPHONE ENCOUNTER
pts spouse stopped by - wife has been ordered to rehab 3 times/week - daughter will be helping - Mylinda Roles will need a letter explaining to work of his need to be away on Monday and Friday For the next 12 weeks.  Please call with any questions

## 2017-05-02 DIAGNOSIS — E78.5 DYSLIPIDEMIA: ICD-10-CM

## 2017-05-02 RX ORDER — ROSUVASTATIN CALCIUM 20 MG/1
TABLET, COATED ORAL
Qty: 90 TAB | Refills: 0 | Status: SHIPPED | OUTPATIENT
Start: 2017-05-02 | End: 2017-06-05 | Stop reason: SDUPTHER

## 2017-05-02 NOTE — TELEPHONE ENCOUNTER
Patient needs just a 30 day supply to get her through until mail order arrives.     Pharmacy verified    Thank you, Alex Melvin

## 2017-05-03 RX ORDER — ROSUVASTATIN CALCIUM 20 MG/1
TABLET, COATED ORAL
Qty: 90 TAB | Refills: 0 | Status: SHIPPED | OUTPATIENT
Start: 2017-05-03 | End: 2017-11-02 | Stop reason: SDUPTHER

## 2017-05-12 DIAGNOSIS — E78.5 DYSLIPIDEMIA: ICD-10-CM

## 2017-06-05 ENCOUNTER — OFFICE VISIT (OUTPATIENT)
Dept: CARDIOLOGY CLINIC | Age: 77
End: 2017-06-05

## 2017-06-05 VITALS
HEART RATE: 76 BPM | OXYGEN SATURATION: 98 % | DIASTOLIC BLOOD PRESSURE: 64 MMHG | WEIGHT: 182.2 LBS | BODY MASS INDEX: 29.28 KG/M2 | SYSTOLIC BLOOD PRESSURE: 120 MMHG | RESPIRATION RATE: 16 BRPM | HEIGHT: 66 IN

## 2017-06-05 DIAGNOSIS — I50.21 SYSTOLIC CHF, ACUTE (HCC): ICD-10-CM

## 2017-06-05 DIAGNOSIS — I21.4 NSTEMI (NON-ST ELEVATED MYOCARDIAL INFARCTION) (HCC): ICD-10-CM

## 2017-06-05 DIAGNOSIS — I10 ESSENTIAL HYPERTENSION: ICD-10-CM

## 2017-06-05 DIAGNOSIS — I25.10 CORONARY ARTERY DISEASE DUE TO LIPID RICH PLAQUE: Primary | ICD-10-CM

## 2017-06-05 DIAGNOSIS — I51.81 TAKOTSUBO CARDIOMYOPATHY: ICD-10-CM

## 2017-06-05 DIAGNOSIS — I25.83 CORONARY ARTERY DISEASE DUE TO LIPID RICH PLAQUE: Primary | ICD-10-CM

## 2017-06-05 RX ORDER — BUMETANIDE 0.5 MG/1
0.5 TABLET ORAL DAILY
Qty: 90 TAB | Refills: 2 | Status: SHIPPED | OUTPATIENT
Start: 2017-06-05 | End: 2018-02-18 | Stop reason: SDUPTHER

## 2017-06-05 RX ORDER — CLOPIDOGREL BISULFATE 75 MG/1
75 TABLET ORAL DAILY
Qty: 90 TAB | Refills: 3 | Status: SHIPPED | OUTPATIENT
Start: 2017-06-05 | End: 2019-07-24

## 2017-06-05 RX ORDER — CARVEDILOL 3.12 MG/1
TABLET ORAL
Qty: 180 TAB | Refills: 3 | Status: SHIPPED | OUTPATIENT
Start: 2017-06-05 | End: 2017-06-05 | Stop reason: SDUPTHER

## 2017-06-05 RX ORDER — CARVEDILOL 3.12 MG/1
TABLET ORAL
Qty: 60 TAB | Refills: 0 | Status: SHIPPED | OUTPATIENT
Start: 2017-06-05 | End: 2017-07-03 | Stop reason: SDUPTHER

## 2017-06-05 NOTE — PROGRESS NOTES
Office Follow-up    NAME: Ruben Krishna   :  1940  MRM:  [id number]    Date:  2017            Assessment:     Problem List  Date Reviewed: 3/2/2017          Codes Class Noted    SOB (shortness of breath) ICD-10-CM: R06.02  ICD-9-CM: 786.05  6/10/0513        Systolic CHF, acute ([facility] ICD-10-CM: I50.21  ICD-9-CM: 428.21, 428.0  2017        NSTEMI (non-ST elevated myocardial infarction) ([facility] ICD-10-CM: I21.4  ICD-9-CM: 410.70  2017        Chest pain ICD-10-CM: R07.9  ICD-9-CM: 786.50  2/15/2017        HTN (hypertension) ICD-10-CM: I10  ICD-9-CM: 401.9  2/15/2017        Dyslipidemia ICD-10-CM: E78.5  ICD-9-CM: 272.4  2015        Takotsubo cardiomyopathy ICD-10-CM: I51.81  ICD-9-CM: 429.83  2012        CAD (coronary artery disease) ICD-10-CM: I25.10  ICD-9-CM: 414.00  2012    Overview Addendum 2012 12:20 PM by Wilton Carson MD     CATH 12  Mid LAD: There was a diffuse 30 % stenosis. Distal LAD: There was a diffuse 20 % stenosis. Proximal circumflex: There was a discrete 60 % stenosis. Mid circumflex: There was a diffuse 60 % stenosis. 1st obtuse marginal: There was a discrete 20 % stenosis in the proximal third of the vessel segment. Proximal RCA: There was a discrete 20 % stenosis at a site with no prior intervention. Mid RCA: There was a discrete 20 % stenosis. Distal RCA: There was a 20 % stenosis. DM (diabetes mellitus) ([facility] ICD-10-CM: E11.9  ICD-9-CM: 250.00  2012                 Plan:     1. CAD/ LCX ALETHEA/ Residual LAD and OM disease: Continue ASA, Plavix and statins. 2. Dyslipidemia: All parameters including LDL, TG, LDLp, Sm LDL, HsCRP have significantly improved on Crestor, Trilipix and Fish oil. Will continue these with diet discipline. 3. HTN: BP controlled. Continue Coreg. 4. Takotsubo CM/ Chronic Systolic CHF: Reassess LV function by an echocardiogram.  Continue Bumex at 0.5 mg p.o. daily.   5. DM:  Continue strict DM control. 6. Continue cardiac rehab at [address] [place] [address]  7. FU in 6 months. Subjective:     Allyn Palencia, a 68y.o. year-old who who underwent cardiac catheterization on February 16, 2017 and received a stent in the left circumflex coronary artery. She is been going to the cardiac rehab and really loves the program.  She is feeling quite energetic and even her pain from sciatica has improved. She would like to continue cardiac rehab. Exam:     Physical Exam:  Visit Vitals    /64 (BP 1 Location: Right arm, BP Patient Position: Sitting)    Pulse 76    Resp 16    Ht 5' 6\" (1.676 m)    Wt 182 lb 3.2 oz (82.6 kg)    SpO2 98%    BMI 29.41 kg/m2     General appearance - alert, well appearing, and in no distress  Mental status - affect appropriate to mood  Eyes - sclera anicteric, moist mucous membranes  Neck - supple, no significant adenopathy  Chest - clear to auscultation, no wheezes, rales or rhonchi  Heart - normal rate, regular rhythm, normal S1, S2, no murmurs, rubs, clicks or gallops  Extremities - peripheral pulses normal, no pedal edema    Medications:     Current Outpatient Prescriptions   Medication Sig    rosuvastatin (CRESTOR) 20 mg tablet TAKE 1 TABLET NIGHTLY    Omega-3-DHA-EPA-Fish Oil (FISH OIL) 1,000 mg (120 mg-180 mg) cap Take 1 Cap by mouth daily.  bumetanide (BUMEX) 0.5 mg tablet Take 1 Tab by mouth daily.  clopidogrel (PLAVIX) 75 mg tab Take 1 Tab by mouth daily.  lisinopril (PRINIVIL, ZESTRIL) 10 mg tablet Take 10 mg by mouth nightly.  acetaminophen (TYLENOL) 500 mg tablet Take 1,000 mg by mouth every six (6) hours as needed for Pain.  co-enzyme Q-10 (CO Q-10) 50 mg capsule Take 100 mg by mouth nightly.  metFORMIN (GLUCOPHAGE) 1,000 mg tablet Take 1 Tab by mouth two (2) times daily (with meals).  aspirin 81 mg chewable tablet Take 81 mg by mouth nightly.  therapeutic multivitamin (THERAGRAN) tablet Take 1 Tab by mouth daily.     conjugated estrogens (PREMARIN) 0.625 mg tablet Take 0.625 mg by mouth every evening.  pregabalin (LYRICA) 150 mg capsule Take 150 mg by mouth two (2) times a day.  carvedilol (COREG) 3.125 mg tablet TAKE 1 TABLET TWICE A DAY    ergocalciferol (VITAMIN D2) 50,000 unit capsule Take 50,000 Units by mouth every Sunday.  traMADol (ULTRAM) 50 mg tablet Take 50 mg by mouth every six (6) hours as needed for Pain. Patient will take 2 tabs every 8 hours as needed (0900, 1600, 2100)    fluticasone (FLONASE) 50 mcg/actuation nasal spray 1 [place] by Both Nostrils route nightly.  fenofibric acid (TRILIPIX) 135 mg capsule Take 135 mg by mouth nightly. No current facility-administered medications for this visit. Diagnostic Data Review:     EKG:normal sinus rhythm, normal ST segment, normal axis, normal intervals. 2/16/2017: CATH- LCX 70% FFR + 0.79   --- s/p ALETHEA (2.25x23)-> LCX  2/17/2017: ECHO- EF 35-40%, mid-distal AS/Septal/apical AK   12/4/13: STRESS NUC- Normal perfusion, LVEF- 80%. 3/22/13: ECHO- Normal LV function. 12/11/12: CATH- LAD: m30%, d: 20%, LCX: p60%, m:60%, OM1: 20%, RCA: p20%, m20%, d20%   2012: ECHO- EF 40%, Takotsubo Cardiomyopathy.  Mild LVH      Lab Review:     Lab Results   Component Value Date/Time    Cholesterol, total 116 02/17/2017 04:34 AM    HDL Cholesterol 54 02/17/2017 04:34 AM    LDL, calculated 34.2 02/17/2017 04:34 AM    Triglyceride 139 02/17/2017 04:34 AM    CHOL/HDL Ratio 2.1 02/17/2017 04:34 AM     Lab Results   Component Value Date/Time    Creatinine 0.77 02/20/2017 09:11 AM     Lab Results   Component Value Date/Time    BUN 22 02/20/2017 09:11 AM     Lab Results   Component Value Date/Time    Potassium 4.2 02/20/2017 09:11 AM     Lab Results   Component Value Date/Time    Hemoglobin A1c 5.8 02/17/2017 04:34 AM     Lab Results   Component Value Date/Time    HGB 11.9 02/20/2017 08:59 AM     Lab Results   Component Value Date/Time    PLATELET 530 33/13/5048 08:59 AM     No results for input(s): CPK, CKMB, TROIQ in the last 72 hours. No lab exists for component: HILARY, MAISHAB             ___________________________________________________    Giancarlo Mcginnis.  Naresh uQreshi MD, [facility]

## 2017-06-05 NOTE — PROGRESS NOTES
Chief Complaint   Patient presents with    Coronary Artery Disease    Hypertension     Visit Vitals    /64 (BP 1 Location: Right arm, BP Patient Position: Sitting)    Pulse 76    Resp 16    Ht 5' 6\" (1.676 m)    Wt 182 lb 3.2 oz (82.6 kg)    SpO2 98%    BMI 29.41 kg/m2     Pt presents today w/o complaint. Pt currently attending cardiac rehab.

## 2017-06-05 NOTE — MR AVS SNAPSHOT
Visit Information Date & Time Provider Department Dept. Phone Encounter #  
 6/5/2017  2:20 PM Janis Clark [facility] [phone] [id number] Follow-up Instructions Follow-up and Disposition History Your Appointments 9/7/2017 10:00 AM  
ESTABLISHED PATIENT with Janis Clark MD  
[facility] ([facility]) Appt Note: 6 MO FUP  
 [address] [address] [facility]  
[address] [place] [address] [facility] [address] Upcoming Health Maintenance Date Due  
 FOOT EXAM Q1 7/3/1950 MICROALBUMIN Q1 7/3/1950 EYE EXAM RETINAL OR DILATED Q1 7/3/1950 ZOSTER VACCINE AGE 60> 7/3/2000 GLAUCOMA SCREENING Q2Y 7/3/2005 OSTEOPOROSIS SCREENING (DEXA) 7/3/2005 Pneumococcal 65+ Low/Medium Risk (1 of 2 - PCV13) 7/3/2005 MEDICARE YEARLY EXAM 7/3/2005 INFLUENZA AGE 9 TO ADULT 8/1/2017 HEMOGLOBIN A1C Q6M 8/17/2017 LIPID PANEL Q1 2/17/2018 DTaP/Tdap/Td series (2 - Td) 7/30/2026 Allergies as of 6/5/2017  Review Complete On: 3/2/2017 By: Janis Clark MD  
 No Known Allergies Current Immunizations  Never Reviewed Name Date Tdap 7/30/2016  8:33 PM  
  
 Not reviewed this visit You Were Diagnosed With   
  
 Codes Comments NSTEMI (non-ST elevated myocardial infarction) ([facility]    -  Primary ICD-10-CM: I21.4 ICD-9-CM: 410.70 Essential hypertension     ICD-10-CM: I10 
ICD-9-CM: 401.9 Coronary artery disease due to lipid rich plaque     ICD-10-CM: I25.10, I25.83 ICD-9-CM: 414.00, 414.3 Takotsubo cardiomyopathy     ICD-10-CM: I51.81 
ICD-9-CM: 429.83 Systolic CHF, acute ([facility]     ICD-10-CM: I50.21 ICD-9-CM: 428.21, 428.0 Vitals  BP Pulse Resp Height(growth percentile) Weight(growth percentile) SpO2  
 120/64 (BP 1 Location: Right arm, BP Patient Position: Sitting) 76 16 5' 6\" (1.676 m) 182 lb 3.2 oz (82.6 kg) 98% BMI OB Status Smoking Status 29.41 kg/m2 Hysterectomy Never Smoker BMI and BSA Data Body Mass Index Body Surface Area  
 29.41 kg/m 2 1.96 m 2 [facility]erred Pharmacy Pharm[facility] Name Phone [id number] [address] AT [place] [phone] Your Updated Medication List  
  
   
This list is accurate as of: 6/5/17  2:48 PM.  Always use your most recent med list.  
  
  
  
  
 acetaminophen 500 mg tablet Commonly known as:  TYLENOL Take 1,000 mg by mouth every six (6) hours as needed for Pain. aspirin 81 mg chewable tablet Take 81 mg by mouth nightly. bumetanide 0.5 mg tablet Commonly known as:  Luis A Finders Take 1 Tab by mouth daily. carvedilol 3.125 mg tablet Commonly known as:  COREG  
TAKE 1 TABLET TWICE A DAY  
  
 clopidogrel 75 mg Tab Commonly known as:  PLAVIX Take 1 Tab by mouth daily. co-enzyme Q-10 50 mg capsule Commonly known as:  CO Q-10 Take 100 mg by mouth nightly. FISH OIL 1,000 mg (120 mg-180 mg) Cap Generic drug:  Omega-3-DHA-EPA-Fish Oil Take 1 Cap by mouth daily. FLONASE 50 mcg/actuation nasal spray Generic drug:  fluticasone 1 [place] by Both Nostrils route nightly. lisinopril 10 mg tablet Commonly known as:  Jhon Lofty Take 10 mg by mouth nightly. metFORMIN 1,000 mg tablet Commonly known as:  GLUCOPHAGE Take 1 Tab by mouth two (2) times daily (with meals). pregabalin 150 mg capsule Commonly known as:  Sammy Handsome Take 150 mg by mouth two (2) times a day. PREMARIN 0.625 mg tablet Generic drug:  conjugated estrogens Take 0.625 mg by mouth every evening. rosuvastatin 20 mg tablet Commonly known as:  CRESTOR  
TAKE 1 TABLET NIGHTLY  
  
 therapeutic multivitamin tablet Commonly known as:  [facility] Take 1 Tab by mouth daily. traMADol 50 mg tablet Commonly known as:  ULTRAM  
Take 50 mg by mouth every six (6) hours as needed for Pain. Patient will take 2 tabs every 8 hours as needed (0900, 1600, 2100) TRILIPIX 135 mg capsule Generic drug:  fenofibric acid Take 135 mg by mouth nightly. VITAMIN D2 50,000 unit capsule Generic drug:  ergocalciferol Take 50,000 Units by mouth every Sunday. Patient Instructions Schedule your Echo in next few days. See Dr. Corbin Seek again in 6 months. Introducing [facility] & HEALTH SERVICES! Uvaldo Pita introduces [email or web address] patient portal. Now you can access parts of your medical record, email your doctor's office, and request medication refills online. 1. In your internet browser, go to [email or web address] 2. Click on the First Time User? Click Here link in the Sign In box. You will see the New Member Sign Up page. 3. Enter your [email or web address] Access Code exactly as it appears below. You will not need to use this code after youve completed the sign-up process. If you do not sign up before the expiration date, you must request a new code. · [email or web address] Access Code: [email or web address] Expires: 9/3/2017  2:48 PM 
 
4. Enter the last four digits of your Social Security Number (xxxx) and Date of Birth (mm/dd/yyyy) as indicated and click Submit. You will be taken to the next sign-up page. 5. Create a bounce.[email or web address] ID. This will be your [email or web address] login ID and cannot be changed, so think of one that is secure and easy to remember. 6. Create a [email or web address] password. You can change your password at any time. 7. Enter your Password Reset Question and Answer. This can be used at a later time if you forget your password. 8. Enter your e-mail address. You will receive e-mail notification when new information is available in [address] 9. Click Sign Up. You can now view and download portions of your medical record.  
10. Click the Download Summary menu link to download a portable copy of your medical information. If you have questions, please visit the Frequently Asked Questions section of the Animail website. Remember, Animail is NOT to be used for urgent needs. For medical emergencies, dial 911. Now available from your iPhone and Android! Please provide this summary of care documentation to your next provider. Your primary care clinician is listed as Tonya Ramos. If you have any questions after today's visit, please call [phone].

## 2017-06-30 ENCOUNTER — CLINICAL SUPPORT (OUTPATIENT)
Dept: CARDIOLOGY CLINIC | Age: 77
End: 2017-06-30

## 2017-06-30 DIAGNOSIS — I25.83 CORONARY ARTERY DISEASE DUE TO LIPID RICH PLAQUE: ICD-10-CM

## 2017-06-30 DIAGNOSIS — R06.02 SOB (SHORTNESS OF BREATH): ICD-10-CM

## 2017-06-30 DIAGNOSIS — I50.21 SYSTOLIC CHF, ACUTE (HCC): ICD-10-CM

## 2017-06-30 DIAGNOSIS — I51.81 TAKOTSUBO CARDIOMYOPATHY: Primary | ICD-10-CM

## 2017-06-30 DIAGNOSIS — I21.4 NSTEMI (NON-ST ELEVATED MYOCARDIAL INFARCTION) (HCC): ICD-10-CM

## 2017-06-30 DIAGNOSIS — I10 ESSENTIAL HYPERTENSION: ICD-10-CM

## 2017-06-30 DIAGNOSIS — I25.10 CORONARY ARTERY DISEASE DUE TO LIPID RICH PLAQUE: ICD-10-CM

## 2017-07-03 ENCOUNTER — TELEPHONE (OUTPATIENT)
Dept: CARDIOLOGY CLINIC | Age: 77
End: 2017-07-03

## 2017-07-03 NOTE — TELEPHONE ENCOUNTER
Patient said she would like to speak with someone about her possible tooth extraction for later today.  Please call

## 2017-07-03 NOTE — TELEPHONE ENCOUNTER
Return call placed to pt (IDx2). Spoke with patient about tooth extraction. Informed pt that she could not stop her Plavix. Discussed with pt that it was up to the dental office whether they would extract the tooth or not considering she is on Plavix. Pt spoke with Yasmin Douglas at Dr. Maksim Chisholm office. States that they need to know if patient could have tooth extracted in office d/t dentist office policy. Nurse informed by office that policy states if the patient is on Plavix they must have patient sent to oral surgeon. Explained that patient could have tooth extracted, but could NOT stop her Plavix. Discussed that if they had protocols in place for patients on Plavix, they would need to make that decision on whether to extract in office of with oral surgeon. Opportunities for questions, clarifications, and concerns provided.

## 2017-07-03 NOTE — TELEPHONE ENCOUNTER
Return call placed to patients' dentist, she may need a urgent  tooth extraction later this morning and  asks if she is ok to do so in her office or to the oral surgeon. While we are unable to make the determination if the patient must go to the oral surgeon, she may have tooth extracted if needed . She cannot however stop Plavix to do so, she had a drug eluding stent placed in left circumflex. 2/16/17. Letter faxed [phone].

## 2017-07-03 NOTE — TELEPHONE ENCOUNTER
Dr Verito Mathur) calling to discuss patient's condition. Patient will possibly need a tooth extraction later this morning. Can be reached at [phone]. Thanks!

## 2017-07-04 RX ORDER — CARVEDILOL 3.12 MG/1
TABLET ORAL
Qty: 60 TAB | Refills: 0 | Status: SHIPPED | OUTPATIENT
Start: 2017-07-04 | End: 2017-07-31 | Stop reason: SDUPTHER

## 2017-07-04 RX ORDER — LISINOPRIL 10 MG/1
TABLET ORAL
Qty: 90 TAB | Refills: 2 | Status: SHIPPED | OUTPATIENT
Start: 2017-07-04 | End: 2018-03-01 | Stop reason: SDUPTHER

## 2017-07-10 RX ORDER — BUMETANIDE 0.5 MG/1
TABLET ORAL
Qty: 30 TAB | Refills: 2 | Status: SHIPPED | OUTPATIENT
Start: 2017-07-10 | End: 2018-11-15 | Stop reason: SDUPTHER

## 2017-07-31 RX ORDER — CARVEDILOL 3.12 MG/1
TABLET ORAL
Qty: 180 TAB | Refills: 3 | Status: SHIPPED | OUTPATIENT
Start: 2017-07-31 | End: 2017-07-31 | Stop reason: SDUPTHER

## 2017-07-31 RX ORDER — CARVEDILOL 3.12 MG/1
TABLET ORAL
Qty: 28 TAB | Refills: 0 | Status: SHIPPED | OUTPATIENT
Start: 2017-07-31 | End: 2018-06-07 | Stop reason: SDUPTHER

## 2017-10-05 ENCOUNTER — TELEPHONE (OUTPATIENT)
Dept: CARDIOLOGY CLINIC | Age: 77
End: 2017-10-05

## 2017-10-05 NOTE — TELEPHONE ENCOUNTER
Pt needs cardiac clearance for Coronia transplant and cataract surgery on left eye with Dr. Fuentes Yris on 10/18/17. Clearance can be faxed to [phone].     Thank you,  Rosalie Leung

## 2017-10-05 NOTE — TELEPHONE ENCOUNTER
Pt needs cardiac clearance for cornea and cataract surgery for 10/18/17. Pt is on Plavix since her NSTEMI with PCI 2/16/17. Last office visit June 2017. Please advise on Plavix and Clearance Letter.

## 2017-10-13 ENCOUNTER — TELEPHONE (OUTPATIENT)
Dept: CARDIOLOGY CLINIC | Age: 77
End: 2017-10-13

## 2017-10-13 NOTE — TELEPHONE ENCOUNTER
Patient is calling re her cardiac clearance she says nothing has been done so far and she is due to have the procedure this coming Wednesday @10am  she's having cadderac surgery and a coronia replaced in her eye with dr. Fuentes Yris   Phone surgery center: [phone]    Fax: [phone]

## 2017-11-02 DIAGNOSIS — E78.5 DYSLIPIDEMIA: ICD-10-CM

## 2017-11-02 RX ORDER — ROSUVASTATIN CALCIUM 20 MG/1
TABLET, COATED ORAL
Qty: 90 TAB | Refills: 1 | Status: SHIPPED | OUTPATIENT
Start: 2017-11-02 | End: 2018-06-04 | Stop reason: SDUPTHER

## 2017-11-02 NOTE — TELEPHONE ENCOUNTER
Requested Prescriptions     Signed Prescriptions Disp Refills    rosuvastatin (CRESTOR) 20 mg tablet 90 Tab 1     Sig: TAKE 1 TABLET NIGHTLY     Authorizing Provider: Keisha Sorensen     Ordering User: Chelsea Avina

## 2017-11-02 NOTE — TELEPHONE ENCOUNTER
Requested Prescriptions     Pending Prescriptions Disp Refills    rosuvastatin (CRESTOR) 20 mg tablet 90 Tab 0     Sig: TAKE 1 TABLET NIGHTLY     Last OV 6/5/17  Next OV 12/1/17    Pharmacy verified  90 day supply    Thank you, AP

## 2018-01-09 ENCOUNTER — TELEPHONE (OUTPATIENT)
Dept: CARDIOLOGY CLINIC | Age: 78
End: 2018-01-09

## 2018-01-09 NOTE — TELEPHONE ENCOUNTER
Patient called stating she will be having cataract surgery as well as a cornea transplant. She needs a letter of clearance faxed to Dr. Pratibha Amaro's office at [phone].  Thank you

## 2018-01-09 NOTE — TELEPHONE ENCOUNTER
She was last seen in June. Does she need to come in since she is having a cornea transplant or can you clear her from her last OV? She also takes Plavix and ASA.

## 2018-01-09 NOTE — TELEPHONE ENCOUNTER
She does not come to see me for the clearance. When is she going to get her surgery. She received her stent Feb 16th 2017 so technically she should be on Plavix until Feb 16th 2018. Is she planning surgery after this or before this date.      Dahlia ESIPNOSA

## 2018-01-09 NOTE — TELEPHONE ENCOUNTER
So, come to find out, she does NOT need to come off her meds! Surgery will be Jan 17. They did the left eye transplant 3 months ago with approval from Dr Maddy Lopez, now she needs the right eye done and again they need his approval since it has not been a year since she has had her stent but again they DO NOT need her meds stopped.

## 2018-01-12 ENCOUNTER — OFFICE VISIT (OUTPATIENT)
Dept: FAMILY MEDICINE CLINIC | Age: 78
End: 2018-01-12

## 2018-01-12 VITALS
TEMPERATURE: 97.9 F | SYSTOLIC BLOOD PRESSURE: 99 MMHG | BODY MASS INDEX: 29.73 KG/M2 | WEIGHT: 185 LBS | DIASTOLIC BLOOD PRESSURE: 64 MMHG | HEART RATE: 76 BPM | HEIGHT: 66 IN | RESPIRATION RATE: 16 BRPM | OXYGEN SATURATION: 95 %

## 2018-01-12 DIAGNOSIS — Z01.818 PRE-OPERATIVE EXAM: Primary | ICD-10-CM

## 2018-01-12 DIAGNOSIS — H26.9 CATARACT OF RIGHT EYE, UNSPECIFIED CATARACT TYPE: ICD-10-CM

## 2018-01-12 NOTE — PROGRESS NOTES
Subjective    Chief complaint: pre-op evaluation     Luis Eduardo Gutiérrez is an 68 y.o. female with a hx of CAD s/p stents in 2/17, HLD, HTN, T2DM, fibromyalgia presents as a new patient for preo-operative evaluation. She is undergoing a right corneal replacement and right cataract removal. She underwent similar procedures on her left eye 3 months ago. Procedure is to take place at the [facility] and surgery is to be performed by Dr. Atilio Charles in 2 weeks. CAD s/p stent: ASA 81mg daily, Plavix 75mg daily, Crestor 20mg daily   HTN: Lisinopril 10mg daily and Coreg 3.125mg daily   HLD: Crestor 20mg daily   T2DM: Metformin 1000mg BID daily. BG stable at home ranging between   Fibromyalgia: Lyrica 150mg BID      Pt reports continuing asa and plavix during her previous surgery and she did not experience any bleeding complications. Allergies - reviewed:   No Known Allergies      Medications - reviewed:   Current Outpatient Prescriptions   Medication Sig    rosuvastatin (CRESTOR) 20 mg tablet TAKE 1 TABLET NIGHTLY    carvedilol (COREG) 3.125 mg tablet TAKE 1 TABLET TWICE A DAY    bumetanide (BUMEX) 0.5 mg tablet TAKE 1 TAB BY MOUTH DAILY.  lisinopril (PRINIVIL, ZESTRIL) 10 mg tablet TAKE 1 TABLET DAILY    clopidogrel (PLAVIX) 75 mg tab Take 1 Tab by mouth daily.  co-enzyme Q-10 (CO Q-10) 50 mg capsule Take 100 mg by mouth nightly.  metFORMIN (GLUCOPHAGE) 1,000 mg tablet Take 1 Tab by mouth two (2) times daily (with meals).  aspirin 81 mg chewable tablet Take 81 mg by mouth nightly.  therapeutic multivitamin (THERAGRAN) tablet Take 1 Tab by mouth daily.  pregabalin (LYRICA) 150 mg capsule Take 150 mg by mouth two (2) times a day.  traMADol (ULTRAM) 50 mg tablet Take 50 mg by mouth every six (6) hours as needed for Pain.  Patient will take 2 tabs every 8 hours as needed (0900, 1600, 2100)    fluticasone (FLONASE) 50 mcg/actuation nasal spray 1 Spray by Both Nostrils route nightly.  fenofibric acid (TRILIPIX) 135 mg capsule Take 135 mg by mouth nightly.  bumetanide (BUMEX) 0.5 mg tablet Take 1 Tab by mouth daily.  Omega-3-DHA-EPA-Fish Oil (FISH OIL) 1,000 mg (120 mg-180 mg) cap Take 1 Cap by mouth daily.  lisinopril (PRINIVIL, ZESTRIL) 10 mg tablet Take 10 mg by mouth nightly.  acetaminophen (TYLENOL) 500 mg tablet Take 1,000 mg by mouth every six (6) hours as needed for Pain.  conjugated estrogens (PREMARIN) 0.625 mg tablet Take 0.625 mg by mouth every evening.  ergocalciferol (VITAMIN D2) 50,000 unit capsule Take 50,000 Units by mouth every Sunday. No current facility-administered medications for this visit. Past Medical History - reviewed:  Past Medical History:   Diagnosis Date    Arthritis     CAD (coronary artery disease)     NSTEMI with PCI LCX on 2/16/17    Cardiomy[facility] ([facility]     ECHO 2/17/2017; EF 35-40%, mid-distal AS/Septal/apical AK     Diabetes mellitus ([facility]     Fibromyalgia     High cholesterol     Hypertension     NSTEMI (non-ST elevated myocardial infarction) ([facility]     NSTEMI with PCI LCX on 2/16/17         Immunizations - reviewed:   Immunization History   Administered Date(s) Administered    Influenza Vaccine 11/01/2017    Tdap 07/30/2016         ROS  Constitutional: negative for fatigue and malaise  Respiratory: negative for shortness of breath  Cardiovascular: negative for chest pain  Gastrointestinal: negative for abdominal pain  Neurological: negative for dizziness/headache      Physical Exam  Visit Vitals    BP 99/64 (BP 1 Location: Right arm, BP Patient Position: Sitting)    Pulse 76    Temp 97.9 °F (36.6 °C) (Oral)    Resp 16    Ht 5' 6\" (1.676 m)    Wt 185 lb (83.9 kg)    SpO2 95%    BMI 29.86 kg/m2       General appearance - Alert, NAD. Head: Atraumatic. Normocephalic. No lymphadenopathy  Eyes: EOMI. Sclera white. Respiratory - LCTAB.  No wheeze/rale/rhonchi  Heart - Normal rate, regular rhythm. No m/r/r  Abdomen - Soft, non tender. Non distended. Neurological - No focal deficits. Speech normal.   Musculoskeletal - Normal ROM, Gait normal.    Extremities - No LE edema. Distal pulses intact  Skin - normal coloration and normal turgor. No cyanosis, no rash. Assessment/Plan    ICD-10-CM ICD-9-CM    1. Pre-operative exam Z01.818 V72.84    2. Cataract of right eye, unspecified cataract type H26.9 366.9      68 y.o. female with a hx of CAD s/p stents in 2/17, HLD, HTN, T2DM, fibromyalgia presents as a new patient for preo-operative evaluation for right corneal transplant and cataract removal. Well appearing on exam and denied any acute cardiovascular or respiratory complaints today.   -Continue all medications perioperatively   -No indications for labs/EKG   -Follow-up for an annual wellness exam     Follow-up Disposition:  Return if symptoms worsen or fail to improve. I have discussed the diagnosis with the patient and the intended plan as seen in the above orders.  she has expressed understanding.  The patient has received an after-visit summary and questions were answered concerning future plans.  I have discussed medication side effects and warnings with the patient as well.     Pt discussed with Dr. Ozzie Macario MD  Family Medicine Resident

## 2018-01-12 NOTE — MR AVS SNAPSHOT
Visit Information Date & Time Provider Department Dept. Phone Encounter #  
 1/12/2018  9:00 AM Bon Tenorio MD [facility] [place] [phone] [id number] Follow-up Instructions Return if symptoms worsen or fail to improve. Your Appointments 1/12/2018  9:00 AM  
New Patient with Bon Tenorio MD  
[facility]) Appt Note: NP est PCP - pre op eye surgery form needs to be filled anna marie 1/2/18  
 [address] [place] [address] 3 [address] [facility]  
[phone]  
  
   
 [address] [place] [address] [facility] [address] Upcoming Health Maintenance Date Due  
 FOOT EXAM Q1 7/3/1950 MICROALBUMIN Q1 7/3/1950 EYE EXAM RETINAL OR DILATED Q1 7/3/1950 ZOSTER VACCINE AGE 60> 5/3/2000 GLAUCOMA SCREENING Q2Y 7/3/2005 OSTEOPOROSIS SCREENING (DEXA) 7/3/2005 Pneumococcal 65+ Low/Medium Risk (1 of 2 - PCV13) 7/3/2005 MEDICARE YEARLY EXAM 7/3/2005 Influenza Age 5 to Adult 8/1/2017 HEMOGLOBIN A1C Q6M 8/17/2017 LIPID PANEL Q1 2/17/2018 DTaP/Tdap/Td series (2 - Td) 7/30/2026 Allergies as of 1/12/2018  Review Complete On: 1/12/2018 By: Liliya Modi LPN No Known Allergies Current Immunizations  Never Reviewed Name Date Influenza Vaccine 11/1/2017 Tdap 7/30/2016  8:33 PM  
  
 Not reviewed this visit Vitals BP Pulse Temp Resp Height(growth percentile) Weight(growth percentile) 96/61 (BP 1 Location: Left arm, BP Patient Position: Sitting) 76 97.9 °F (36.6 °C) (Oral) 16 5' 6\" (1.676 m) 185 lb (83.9 kg) SpO2 BMI OB Status Smoking Status 95% 29.86 kg/m2 Hysterectomy Never Smoker Vitals History BMI and BSA Data Body Mass Index Body Surface Area  
 29.86 kg/m 2 1.98 m 2 Preferred Pharmacy Pharmacy Name Phone [address] [facility] [phone] Your Updated Medication List  
  
   
 This list is accurate as of: 1/12/18  8:57 AM.  Always use your most recent med list.  
  
  
  
  
 acetaminophen 500 mg tablet Commonly known as:  TYLENOL Take 1,000 mg by mouth every six (6) hours as needed for Pain. aspirin 81 mg chewable tablet Take 81 mg by mouth nightly. * bumetanide 0.5 mg tablet Commonly known as:  Willean Margarita Take 1 Tab by mouth daily. * bumetanide 0.5 mg tablet Commonly known as:  Willean Margarita TAKE 1 TAB BY MOUTH DAILY. carvedilol 3.125 mg tablet Commonly known as:  COREG  
TAKE 1 TABLET TWICE A DAY  
  
 clopidogrel 75 mg Tab Commonly known as:  PLAVIX Take 1 Tab by mouth daily. co-enzyme Q-10 50 mg capsule Commonly known as:  CO Q-10 Take 100 mg by mouth nightly. FISH OIL 1,000 mg (120 mg-180 mg) Cap Generic drug:  Omega-3-DHA-EPA-Fish Oil Take 1 Cap by mouth daily. FLONASE 50 mcg/actuation nasal spray Generic drug:  fluticasone 1 [place] by Both Nostrils route nightly. * lisinopril 10 mg tablet Commonly known as:  Kasper Anna Take 10 mg by mouth nightly. * lisinopril 10 mg tablet Commonly known as:  PRINIVIL, ZESTRIL  
TAKE 1 TABLET DAILY  
  
 metFORMIN 1,000 mg tablet Commonly known as:  GLUCOPHAGE Take 1 Tab by mouth two (2) times daily (with meals). pregabalin 150 mg capsule Commonly known as:  Romeliayoshiramsey Adalgisa Take 150 mg by mouth two (2) times a day. PREMARIN 0.625 mg tablet Generic drug:  conjugated estrogens Take 0.625 mg by mouth every evening. rosuvastatin 20 mg tablet Commonly known as:  CRESTOR  
TAKE 1 TABLET NIGHTLY  
  
 therapeutic multivitamin tablet Commonly known as:  [facility] Take 1 Tab by mouth daily. traMADol 50 mg tablet Commonly known as:  ULTRAM  
Take 50 mg by mouth every six (6) hours as needed for Pain. Patient will take 2 tabs every 8 hours as needed (0900, 1600, 2100) TRILIPIX 135 mg capsule Generic drug:  fenofibric acid Take 135 mg by mouth nightly. VITAMIN D2 50,000 unit capsule Generic drug:  ergocalciferol Take 50,000 Units by mouth every Sunday. * Notice: This list has 4 medication(s) that are the same as other medications prescribed for you. Read the directions carefully, and ask your doctor or other care provider to review them with you. Follow-up Instructions Return if symptoms worsen or fail to improve. Patient Instructions Cataracts: Care Instructions Your Care Instructions A cataract is a clouding of the lens of the eye. The lens focuses light on the retina at the back of the eye. Cataracts block some of the light and make it harder for you to see clearly. Cataracts often develop when you get older. Most cataracts grow slowly. At first, you may just need stronger glasses to help you see better. Later, if the cataracts grow and begin to seriously impair your vision, you can have surgery to remove them. Follow-up care is a key part of your treatment and safety. Be sure to make and go to all appointments, and call your doctor if you are having problems. It's also a good idea to know your test results and keep a list of the medicines you take. How can you care for yourself at home? · Move room lights and use window shades to avoid glare. · Use more lighting or higher-watt bulbs. · Use a magnifying glass for reading. Look for large-print books and other reading material to make reading more enjoyable. · Have your eyes checked regularly, and update your glasses when needed. · Wear sunglasses to block out harmful sunlight. Buy sunglasses that screen out ultraviolet A and B (UVA and UVB) rays. · Do not smoke. Smoking can make cataracts worse. If you need help quitting, talk to your doctor about stop-smoking programs and medicines. These can increase your chances of quitting for good. When should you call for help? Watch closely for changes in your health, and be sure to contact your doctor if: 
? · Your vision is getting worse. ? · You have increasing trouble doing everyday tasks, like driving or reading the newspaper, because of your eyesight. Where can you learn more? Go to http://marty-mario.info/. Enter P916 in the search box to learn more about \"Cataracts: Care Instructions. \" Current as of: March 3, 2017 Content Version: 11.4 © 1675-2616 ChangeAgain.Me. Care instructions adapted under license by [employer] (which disclaims liability or warranty for this information). If you have questions about a medical condition or this instruction, always ask your healthcare professional. [address] any warranty or liability for your use of this information. Introducing \[facility]\"" & HEALTH SERVICES! Fortino Martinez introduces [employer] patient portal. Now you can access parts of your medical record, email your doctor's office, and request medication refills online. 1. In your internet browser, go to [email or web address] 2. Click on the First Time User? Click Here link in the Sign In box. You will see the New Member Sign Up page. 3. Enter your [employer] Access Code exactly as it appears below. You will not need to use this code after youve completed the sign-up process. If you do not sign up before the expiration date, you must request a new code. · [employer] Access Code: Louisa Aguirre Expires: 4/12/2018  8:57 AM 
 
4. Enter the last four digits of your Social Security Number (xxxx) and Date of Birth (mm/dd/yyyy) as indicated and click Submit. You will be taken to the next sign-up page. 5. Create a [employer] ID. This will be your [employer] login ID and cannot be changed, so think of one that is secure and easy to remember. 6. Create a [employer] password. You can change your password at any time. 7. Enter your Password Reset Question and Answer. This can be used at a later time if you forget your password. 8. Enter your e-mail address. You will receive e-mail notification when new information is available in [address] 9. Click Sign Up. You can now view and download portions of your medical record. 10. Click the Download Summary menu link to download a portable copy of your medical information. If you have questions, please visit the Frequently Asked Questions section of the [employer] website. Remember, [employer] is NOT to be used for urgent needs. For medical emergencies, dial 911. Now available from your iPhone and Android! Please provide this summary of care documentation to your next provider. Your primary care clinician is listed as Sherrie Guzman. If you have any questions after today's visit, please call [phone].

## 2018-01-12 NOTE — TELEPHONE ENCOUNTER
Patient called to speak with you regarding her cardiac clearance for upcoming surgery.   Phone [phone]  SK

## 2018-01-12 NOTE — TELEPHONE ENCOUNTER
The physician is Dr Patience Sheldon fax [phone] in writing that cataract surgery and cornea transplant is okay since it has not been a year since stent was placed. She does NOT need to hold meds.

## 2018-01-12 NOTE — PATIENT INSTRUCTIONS
Cataracts: Care Instructions  Your Care Instructions    A cataract is a clouding of the lens of the eye. The lens focuses light on the retina at the back of the eye. Cataracts block some of the light and make it harder for you to see clearly. Cataracts often develop when you get older. Most cataracts grow slowly. At first, you may just need stronger glasses to help you see better. Later, if the cataracts grow and begin to seriously impair your vision, you can have surgery to remove them. Follow-up care is a key part of your treatment and safety. Be sure to make and go to all appointments, and call your doctor if you are having problems. It's also a good idea to know your test results and keep a list of the medicines you take. How can you care for yourself at home? · Move room lights and use window shades to avoid glare. · Use more lighting or higher-watt bulbs. · Use a magnifying glass for reading. Look for large-print books and other reading material to make reading more enjoyable. · Have your eyes checked regularly, and update your glasses when needed. · Wear sunglasses to block out harmful sunlight. Buy sunglasses that screen out ultraviolet A and B (UVA and UVB) rays. · Do not smoke. Smoking can make cataracts worse. If you need help quitting, talk to your doctor about stop-smoking programs and medicines. These can increase your chances of quitting for good. When should you call for help? Watch closely for changes in your health, and be sure to contact your doctor if:  ? · Your vision is getting worse. ? · You have increasing trouble doing everyday tasks, like driving or reading the newspaper, because of your eyesight. Where can you learn more? Go to http://marty-mario.info/. Enter P916 in the search box to learn more about \"Cataracts: Care Instructions. \"  Current as of: March 3, 2017  Content Version: 11.4  © 3830-1610 [employer].  Care instructions adapted under license by [employer] (which disclaims liability or warranty for this information). If you have questions about a medical condition or this instruction, always ask your healthcare professional. [address] any warranty or liability for your use of this information.

## 2018-01-16 NOTE — TELEPHONE ENCOUNTER
MD Diana Vazquez, RN        Caller: Unspecified (1 week ago,  9:48 AM)                     She can undergo eye surgery. Does not need to see me for cardiac clearance.

## 2018-01-21 ENCOUNTER — APPOINTMENT (OUTPATIENT)
Dept: GENERAL RADIOLOGY | Age: 78
End: 2018-01-21
Attending: EMERGENCY MEDICINE
Payer: MEDICARE

## 2018-01-21 ENCOUNTER — HOSPITAL ENCOUNTER (EMERGENCY)
Age: 78
Discharge: HOME | End: 2018-01-21
Attending: EMERGENCY MEDICINE
Payer: MEDICARE

## 2018-01-21 VITALS
HEART RATE: 75 BPM | DIASTOLIC BLOOD PRESSURE: 65 MMHG | WEIGHT: 175 LBS | TEMPERATURE: 98.5 F | SYSTOLIC BLOOD PRESSURE: 130 MMHG | BODY MASS INDEX: 31.01 KG/M2 | RESPIRATION RATE: 14 BRPM | HEIGHT: 63 IN | OXYGEN SATURATION: 98 %

## 2018-01-21 DIAGNOSIS — R06.02 SHORTNESS OF BREATH: ICD-10-CM

## 2018-01-21 DIAGNOSIS — R61 DIAPHORESIS: ICD-10-CM

## 2018-01-21 DIAGNOSIS — R45.0 NERVOUSNESS: Primary | ICD-10-CM

## 2018-01-21 LAB
ALBUMIN SERPL-MCNC: 3.9 G/DL (ref 3.5–5)
ALBUMIN/GLOB SERPL: 1 {RATIO} (ref 1.1–2.2)
ALP SERPL-CCNC: 39 U/L (ref 45–117)
ALT SERPL-CCNC: 26 U/L (ref 12–78)
ANION GAP SERPL CALC-SCNC: 11 MMOL/L (ref 5–15)
AST SERPL-CCNC: 22 U/L (ref 15–37)
BASOPHILS # BLD: 0 K/UL (ref 0–0.1)
BASOPHILS NFR BLD: 0 % (ref 0–1)
BILIRUB SERPL-MCNC: 0.3 MG/DL (ref 0.2–1)
BUN SERPL-MCNC: 26 MG/DL (ref 6–20)
BUN/CREAT SERPL: 22 (ref 12–20)
CALCIUM SERPL-MCNC: 9.6 MG/DL (ref 8.5–10.1)
CHLORIDE SERPL-SCNC: 106 MMOL/L (ref 97–108)
CO2 SERPL-SCNC: 24 MMOL/L (ref 21–32)
CREAT SERPL-MCNC: 1.19 MG/DL (ref 0.55–1.02)
D DIMER PPP FEU-MCNC: 0.48 MG/L FEU (ref 0–0.65)
EOSINOPHIL # BLD: 0.2 K/UL (ref 0–0.4)
EOSINOPHIL NFR BLD: 3 % (ref 0–7)
ERYTHROCYTE [DISTWIDTH] IN BLOOD BY AUTOMATED COUNT: 14.5 % (ref 11.5–14.5)
GLOBULIN SER CALC-MCNC: 4 G/DL (ref 2–4)
GLUCOSE SERPL-MCNC: 123 MG/DL (ref 65–100)
HCT VFR BLD AUTO: 37.7 % (ref 35–47)
HGB BLD-MCNC: 12.2 G/DL (ref 11.5–16)
LYMPHOCYTES # BLD: 2.1 K/UL (ref 0.8–3.5)
LYMPHOCYTES NFR BLD: 37 % (ref 12–49)
MCH RBC QN AUTO: 26.1 PG (ref 26–34)
MCHC RBC AUTO-ENTMCNC: 32.4 G/DL (ref 30–36.5)
MCV RBC AUTO: 80.6 FL (ref 80–99)
MONOCYTES # BLD: 0.4 K/UL (ref 0–1)
MONOCYTES NFR BLD: 6 % (ref 5–13)
NEUTS SEG # BLD: 3.1 K/UL (ref 1.8–8)
NEUTS SEG NFR BLD: 54 % (ref 32–75)
PLATELET # BLD AUTO: 363 K/UL (ref 150–400)
POTASSIUM SERPL-SCNC: 4.4 MMOL/L (ref 3.5–5.1)
PROT SERPL-MCNC: 7.9 G/DL (ref 6.4–8.2)
RBC # BLD AUTO: 4.68 M/UL (ref 3.8–5.2)
SODIUM SERPL-SCNC: 141 MMOL/L (ref 136–145)
TROPONIN I SERPL-MCNC: <0.04 NG/ML
TROPONIN I SERPL-MCNC: <0.04 NG/ML
WBC # BLD AUTO: 5.7 K/UL (ref 3.6–11)

## 2018-01-21 PROCEDURE — 36415 COLL VENOUS BLD VENIPUNCTURE: CPT | Performed by: EMERGENCY MEDICINE

## 2018-01-21 PROCEDURE — 93005 ELECTROCARDIOGRAM TRACING: CPT

## 2018-01-21 PROCEDURE — 85025 COMPLETE CBC W/AUTO DIFF WBC: CPT | Performed by: EMERGENCY MEDICINE

## 2018-01-21 PROCEDURE — 80053 COMPREHEN METABOLIC PANEL: CPT | Performed by: EMERGENCY MEDICINE

## 2018-01-21 PROCEDURE — 85379 FIBRIN DEGRADATION QUANT: CPT | Performed by: EMERGENCY MEDICINE

## 2018-01-21 PROCEDURE — 71046 X-RAY EXAM CHEST 2 VIEWS: CPT

## 2018-01-21 PROCEDURE — 99285 EMERGENCY DEPT VISIT HI MDM: CPT

## 2018-01-21 PROCEDURE — 84484 ASSAY OF TROPONIN QUANT: CPT | Performed by: EMERGENCY MEDICINE

## 2018-01-21 RX ORDER — SODIUM CHLORIDE 0.9 % (FLUSH) 0.9 %
5-10 SYRINGE (ML) INJECTION EVERY 8 HOURS
Status: DISCONTINUED | OUTPATIENT
Start: 2018-01-21 | End: 2018-01-21 | Stop reason: HOSPADM

## 2018-01-21 RX ORDER — SODIUM CHLORIDE 0.9 % (FLUSH) 0.9 %
5-10 SYRINGE (ML) INJECTION AS NEEDED
Status: DISCONTINUED | OUTPATIENT
Start: 2018-01-21 | End: 2018-01-21 | Stop reason: HOSPADM

## 2018-01-21 RX ORDER — PREGABALIN 150 MG/1
150 CAPSULE ORAL 2 TIMES DAILY
Qty: 20 CAP | Refills: 0 | Status: SHIPPED | OUTPATIENT
Start: 2018-01-21

## 2018-01-21 NOTE — ED NOTES
Patient given discharge instruction by Jorgito Mcintosh. Verbalized understanding, pt discharge home with family.

## 2018-01-21 NOTE — ED TRIAGE NOTES
Patient arrives with the complaint of thinking that she might be having a heart attack. Denies any chest pain but states that she has been generally fatigued, having some diaphoresis. 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

## 2018-01-21 NOTE — DISCHARGE INSTRUCTIONS
Shortness of Breath: Care Instructions  Your Care Instructions  Shortness of breath has many causes. Sometimes conditions such as anxiety can lead to shortness of breath. Some people get mild shortness of breath when they exercise. Trouble breathing also can be a symptom of a serious problem, such as asthma, lung disease, emphysema, heart problems, and pneumonia. If your shortness of breath continues, you may need tests and treatment. Watch for any changes in your breathing and other symptoms. Follow-up care is a key part of your treatment and safety. Be sure to make and go to all appointments, and call your doctor if you are having problems. It's also a good idea to know your test results and keep a list of the medicines you take. How can you care for yourself at home? · Do not smoke or allow others to smoke around you. If you need help quitting, talk to your doctor about stop-smoking programs and medicines. These can increase your chances of quitting for good. · Get plenty of rest and sleep. · Take your medicines exactly as prescribed. Call your doctor if you think you are having a problem with your medicine. · Find healthy ways to deal with stress. ¨ Exercise daily. ¨ Get plenty of sleep. ¨ Eat regularly and well. When should you call for help? Call 911 anytime you think you may need emergency care. For example, call if:  ? · You have severe shortness of breath. ? · You have symptoms of a heart attack. These may include:  ¨ Chest pain or pressure, or a strange feeling in the chest.  ¨ Sweating. ¨ Shortness of breath. ¨ Nausea or vomiting. ¨ Pain, pressure, or a strange feeling in the back, neck, jaw, or upper belly or in one or both shoulders or arms. ¨ Lightheadedness or sudden weakness. ¨ A fast or irregular heartbeat. After you call 911, the  may tell you to chew 1 adult-strength or 2 to 4 low-dose aspirin. Wait for an ambulance. Do not try to drive yourself.    ?Call your doctor now or seek immediate medical care if:  ? · Your shortness of breath gets worse or you start to wheeze. Wheezing is a high-pitched sound when you breathe. ? · You wake up at night out of breath or have to prop your head up on several pillows to breathe. ? · You are short of breath after only light activity or while at rest.   ? Watch closely for changes in your health, and be sure to contact your doctor if:  ? · You do not get better over the next 1 to 2 days. Where can you learn more? Go to http://marty-mario.info/. Enter S780 in the search box to learn more about \"Shortness of Breath: Care Instructions. \"  Current as of: May 12, 2017  Content Version: 11.4  © 5577-1808 [employer]. Care instructions adapted under license by [employer] (which disclaims liability or warranty for this information). If you have questions about a medical condition or this instruction, always ask your healthcare professional. [address] any warranty or liability for your use of this information.

## 2018-01-21 NOTE — ED PROVIDER NOTES
HPI Comments: 68 y.o. female with past medical history significant for CAD (previous MI with 1x stent placement) and HTN who presents from home via private vehicle with chief complaint of anxiety, diaphoresis, and SOB. Pt reports that her Sx stared last night with a \"nervous feeling\" and associated sweating that lasted through the night. She had intermittent mild SOB as well. Pt reports that her current symptoms are somewhat similar to her past MI symptoms. She denies CP, palpitations, dizziness, nausea, vomiting. She also reports that she had a cornea transplant surgery 5 days ago and has been fairly immobile lying in bed the past few days, but denies Hx of blood clots, leg pain or swelling. In addition to her symptoms she reports that she has been weening off of her Lyrica over the past 4 days second to running out of her prescription. She denies numbness or tingling, focal weakness. There are no other acute medical concerns at this time. Social hx: Pt denies smoking cigarettes, drinking alcohol, or using any illicit substances. PCP: Alivia Mahajan NP  Note written by jovon Camacho, as dictated by Elizabeth Lagos MD 2:17 PM       The history is provided by the patient.         Past Medical History:   Diagnosis Date    Arthritis     CAD (coronary artery disease)     NSTEMI with PCI LCX on 2/16/17    Cardiomyopathy ([address]     ECHO 2/17/2017; EF 35-40%, mid-distal AS/Septal/apical AK     Diabetes mellitus ([address]     Fibromyalgia     High cholesterol     Hypertension     NSTEMI (non-ST elevated myocardial infarction) ([address]     NSTEMI with PCI LCX on 2/16/17       Past Surgical History:   Procedure Laterality Date    HX CORONARY STENT PLACEMENT  02/15/2017    HX GYN      hysterectomy    HX ORTHOPAEDIC           Family History:   Problem Relation Age of Onset    Heart Attack Father     High Cholesterol Father        Social History     Social History    Marital status:      Spouse name: N/A    Number of children: N/A    Years of education: N/A     Occupational History    Not on file. Social History Main Topics    Smoking status: Never Smoker    Smokeless tobacco: Not on file    Alcohol use No    Drug use: No    Sexual activity: Not on file     Other Topics Concern    Not on file     Social History Narrative         ALLERGIES: Review of patient's allergies indicates no known allergies. Review of Systems   Constitutional: Positive for diaphoresis. Negative for chills and fever. HENT: Negative for congestion, rhinorrhea, sneezing and sore throat. Eyes: Negative for redness and visual disturbance. Respiratory: Positive for shortness of breath. Negative for cough. Cardiovascular: Negative for chest pain and leg swelling. Gastrointestinal: Negative for abdominal pain, diarrhea, nausea and vomiting. Genitourinary: Negative for difficulty urinating and frequency. Musculoskeletal: Negative for back pain, myalgias and neck stiffness. Skin: Negative for rash. Neurological: Negative for dizziness, syncope, weakness, numbness and headaches. Hematological: Negative for adenopathy. Psychiatric/Behavioral: The patient is nervous/anxious. All other systems reviewed and are negative. Vitals:    01/21/18 1409   BP: 157/79   Pulse: 82   Resp: 14   Temp: 98.5 °F (36.9 °C)   SpO2: 98%   Weight: 79.4 kg (175 lb)   Height: 5' 3\" (1.6 m)            Physical Exam   Constitutional: She is oriented to person, place, and time. She appears well-developed and well-nourished. No distress. HENT:   Head: Normocephalic and atraumatic. Eyes: Conjunctivae are normal. No scleral icterus. Neck: Neck supple. No tracheal deviation present. Cardiovascular: Normal rate, regular rhythm, normal heart sounds and intact distal pulses. Exam reveals no gallop and no friction rub. No murmur heard. Pulmonary/Chest: Effort normal and breath sounds normal. She has no wheezes.  She has no rales. Abdominal: Soft. She exhibits no distension. There is no tenderness. There is no rebound and no guarding. Musculoskeletal: She exhibits no edema. Neurological: She is alert and oriented to person, place, and time. Skin: Skin is warm and dry. No rash noted. Psychiatric: She has a normal mood and affect. Nursing note and vitals reviewed. Note written by jovon Garg, as dictated by Dea Khoury MD 2:57 PM       [facility]  ED Course       Procedures    2:17 PM  EKG interpretation: (Preliminary)  Rhythm: normal sinus rhythm with occasional PVCs . Rate (approx.): 86 bpm; Axis: normal; P wave: normal; QRS interval: normal ; non-specific ST-T abnormalities. Note written by jovon Garg, as dictated by Dea Khoury MD 2:58 PM    Progress Note:  Results, treatment, and follow up plan have been discussed with patient/. Questions were answered. Dea Khoury MD  4:49 PM    A/P: nervousness, sweating, dyspnea - unclear etiology; possibly due to Lyrica withdrawal; states sx's feel somewhat similar to sx's prior to previous MI; reassuring exam; VSS; CBC, CMP, trop times 2, D-dimer, CXR ok; will refill Lyrica; PCP/cards f/u.   Dea Khoury MD  4:51 PM

## 2018-01-22 LAB
ATRIAL RATE: 83 BPM
CALCULATED P AXIS, ECG09: 32 DEGREES
CALCULATED R AXIS, ECG10: 25 DEGREES
CALCULATED T AXIS, ECG11: 100 DEGREES
DIAGNOSIS, 93000: NORMAL
P-R INTERVAL, ECG05: 150 MS
Q-T INTERVAL, ECG07: 374 MS
QRS DURATION, ECG06: 88 MS
QTC CALCULATION (BEZET), ECG08: 439 MS
VENTRICULAR RATE, ECG03: 83 BPM

## 2018-02-18 RX ORDER — BUMETANIDE 0.5 MG/1
TABLET ORAL
Qty: 90 TAB | Refills: 2 | Status: SHIPPED | OUTPATIENT
Start: 2018-02-18 | End: 2018-03-26 | Stop reason: SDUPTHER

## 2018-03-01 RX ORDER — LISINOPRIL 10 MG/1
TABLET ORAL
Qty: 90 TAB | Refills: 2 | Status: SHIPPED | OUTPATIENT
Start: 2018-03-01 | End: 2018-11-26 | Stop reason: SDUPTHER

## 2018-03-26 ENCOUNTER — OFFICE VISIT (OUTPATIENT)
Dept: CARDIOLOGY CLINIC | Age: 78
End: 2018-03-26

## 2018-03-26 VITALS
HEIGHT: 63 IN | WEIGHT: 181.6 LBS | SYSTOLIC BLOOD PRESSURE: 124 MMHG | BODY MASS INDEX: 32.18 KG/M2 | DIASTOLIC BLOOD PRESSURE: 66 MMHG | OXYGEN SATURATION: 97 % | HEART RATE: 72 BPM | RESPIRATION RATE: 18 BRPM

## 2018-03-26 DIAGNOSIS — I25.83 CORONARY ARTERY DISEASE DUE TO LIPID RICH PLAQUE: Primary | ICD-10-CM

## 2018-03-26 DIAGNOSIS — E78.5 DYSLIPIDEMIA: ICD-10-CM

## 2018-03-26 DIAGNOSIS — I10 ESSENTIAL HYPERTENSION: ICD-10-CM

## 2018-03-26 DIAGNOSIS — I25.10 CORONARY ARTERY DISEASE DUE TO LIPID RICH PLAQUE: Primary | ICD-10-CM

## 2018-03-26 PROBLEM — E11.21 TYPE 2 DIABETES WITH NEPHROPATHY (HCC): Status: ACTIVE | Noted: 2018-03-26

## 2018-03-26 NOTE — MR AVS SNAPSHOT
[address] [facility] [address] [facility] 
[phone] Patient: Hiral Rider MRN: [id number] EPF:[id number] Visit Information Date & Time Provider Department Dept. Phone Encounter #  
 3/26/2018 10:40 AM Jacques Nance MD [facility] [phone] [id number] Follow-up Instructions Follow-up and Disposition History Your Appointments 3/28/2019 10:20 AM  
ESTABLISHED PATIENT with Jacques Nance MD  
[facility] ([address]) Appt Note: 1yr f/u  
 [address] [facility] [address] [facility]  
[address] [facility] [address] [address] Upcoming Health Maintenance Date Due  
 FOOT EXAM Q1 7/3/1950 MICROALBUMIN Q1 7/3/1950 EYE EXAM RETINAL OR DILATED Q1 7/3/1950 ZOSTER VACCINE AGE 60> 5/3/2000 GLAUCOMA SCREENING Q2Y 7/3/2005 Bone Densitometry (Dexa) Screening 7/3/2005 Pneumococcal 65+ Low/Medium Risk (1 of 2 - PCV13) 7/3/2005 HEMOGLOBIN A1C Q6M 8/17/2017 LIPID PANEL Q1 2/17/2018 MEDICARE YEARLY EXAM 3/14/2018 DTaP/Tdap/Td series (2 - Td) 7/30/2026 Allergies as of 3/26/2018  Review Complete On: 3/26/2018 By: Tatiana Ruiz LPN No Known Allergies Current Immunizations  Never Reviewed Name Date Influenza Vaccine 11/1/2017 Tdap 7/30/2016  8:33 PM  
  
 Not reviewed this visit You Were Diagnosed With   
  
 Codes Comments Coronary artery disease due to lipid rich plaque    -  Primary ICD-10-CM: I25.10, I25.83 ICD-9-CM: 414.00, 414.3 Dyslipidemia     ICD-10-CM: E78.5 ICD-9-CM: 272.4 Essential hypertension     ICD-10-CM: I10 
ICD-9-CM: 401.9 Vitals  BP Pulse Resp Height(growth percentile) Weight(growth percentile) SpO2  
 124/66 (BP 1 Location: Left arm, BP Patient Position: Sitting) 72 18 5' 3\" (1.6 m) 181 lb 9.6 oz (82.4 kg) 97% BMI OB Status Smoking Status 32.17 kg/m2 Hysterectomy Never Smoker Vitals History BMI and BSA Data Body Mass Index Body Surface Area  
 32.17 kg/m 2 1.91 m 2 Preferred Pharmacy Pharmacy Name Phone [address] [facility] [phone] Your Updated Medication List  
  
   
This list is accurate as of 3/26/18 12:04 PM.  Always use your most recent med list.  
  
  
  
  
 acetaminophen 500 mg tablet Commonly known as:  TYLENOL Take 1,000 mg by mouth every six (6) hours as needed for Pain. aspirin 81 mg chewable tablet Take 81 mg by mouth nightly. bumetanide 0.5 mg tablet Commonly known as:  Shyanne Lehi TAKE 1 TAB BY MOUTH DAILY. carvedilol 3.125 mg tablet Commonly known as:  COREG  
TAKE 1 TABLET TWICE A DAY  
  
 clopidogrel 75 mg Tab Commonly known as:  PLAVIX Take 1 Tab by mouth daily. co-enzyme Q-10 50 mg capsule Commonly known as:  CO Q-10 Take 100 mg by mouth nightly. FISH OIL 1,000 mg (120 mg-180 mg) capsule Generic drug:  omega 3-DHA-EPA-fish oil Take 1 Cap by mouth daily. FLONASE 50 mcg/actuation nasal spray Generic drug:  fluticasone 1 [place] by Both Nostrils route nightly. lisinopril 10 mg tablet Commonly known as:  PRINIVIL, ZESTRIL  
TAKE 1 TABLET DAILY  
  
 metFORMIN 1,000 mg tablet Commonly known as:  GLUCOPHAGE Take 1 Tab by mouth two (2) times daily (with meals). pregabalin 150 mg capsule Commonly known as:  Ryan Campa Take 1 Cap by mouth two (2) times a day. Max Daily Amount: 300 mg. Indications: FIBROMYALGIA  
  
 rosuvastatin 20 mg tablet Commonly known as:  CRESTOR  
TAKE 1 TABLET NIGHTLY  
  
 therapeutic multivitamin tablet Commonly known as:  [facility] Take 1 Tab by mouth daily. traMADol 50 mg tablet Commonly known as:  Tsering Villar  
 Take 50 mg by mouth every six (6) hours as needed for Pain. Patient will take 2 tabs every 8 hours as needed (0900, 1600, 2100) TRILIPIX 135 mg capsule Generic drug:  fenofibric acid Take 135 mg by mouth nightly. Patient Instructions Stop Plavix See Dr. Katelyn Melo in 1 year. Introducing [facility] SERVICES! [facility] introduces [employer] patient portal. Now you can access parts of your medical record, email your doctor's office, and request medication refills online. 1. In your internet browser, go to [email or web address] 2. Click on the First Time User? Click Here link in the Sign In box. You will see the New Member Sign Up page. 3. Enter your [employer] Access Code exactly as it appears below. You will not need to use this code after youve completed the sign-up process. If you do not sign up before the expiration date, you must request a new code. · [employer] Access Code: Franciaalia Zhu Expires: 4/12/2018  9:57 AM 
 
4. Enter the last four digits of your Social Security Number (xxxx) and Date of Birth (mm/dd/yyyy) as indicated and click Submit. You will be taken to the next sign-up page. 5. Create a [employer] ID. This will be your [employer] login ID and cannot be changed, so think of one that is secure and easy to remember. 6. Create a [employer] password. You can change your password at any time. 7. Enter your Password Reset Question and Answer. This can be used at a later time if you forget your password. 8. Enter your e-mail address. You will receive e-mail notification when new information is available in [address] 9. Click Sign Up. You can now view and download portions of your medical record. 10. Click the Download Summary menu link to download a portable copy of your medical information. If you have questions, please visit the Frequently Asked Questions section of the [employer] website.  Remember, [employer] is NOT to be used for urgent needs. For medical emergencies, dial 911. Now available from your iPhone and Android! Please provide this summary of care documentation to your next provider. Your primary care clinician is listed as Baron Simmons. If you have any questions after today's visit, please call [phone].

## 2018-03-26 NOTE — PROGRESS NOTES
Katey Sainz is a 68 y.o. female  Chief Complaint   Patient presents with    Coronary Artery Disease    Cholesterol Problem    Hypertension    CHF

## 2018-03-26 NOTE — PROGRESS NOTES
Office Follow-up    NAME: Christie Villanueva   :  1940  MRM:  [id number]    Date:  3/26/2018            Assessment:     Problem List  Date Reviewed: 3/2/2017          Codes Class Noted    Type 2 diabetes with nephropathy ([facility] ICD-10-CM: E11.21  ICD-9-CM: 250.40, 583.81  3/26/2018        SOB (shortness of breath) ICD-10-CM: R06.02  ICD-9-CM: 786.05          Systolic CHF, acute ([facility] ICD-10-CM: I50.21  ICD-9-CM: 428.21, 428.0  2017        NSTEMI (non-ST elevated myocardial infarction) ([facility] ICD-10-CM: I21.4  ICD-9-CM: 410.70  2017        Chest pain ICD-10-CM: R07.9  ICD-9-CM: 786.50  2/15/2017        HTN (hypertension) ICD-10-CM: I10  ICD-9-CM: 401.9  2/15/2017        Dyslipidemia ICD-10-CM: E78.5  ICD-9-CM: 272.4  2015        Takotsubo cardiomyopathy ICD-10-CM: I51.81  ICD-9-CM: 429.83  2012        CAD (coronary artery disease) ICD-10-CM: I25.10  ICD-9-CM: 414.00  2012    Overview Addendum 2012 12:20 PM by Charito Cormier MD     CATH 12  Mid LAD: There was a diffuse 30 % stenosis. Distal LAD: There was a diffuse 20 % stenosis. Proximal circumflex: There was a discrete 60 % stenosis. Mid circumflex: There was a diffuse 60 % stenosis. 1st obtuse marginal: There was a discrete 20 % stenosis in the proximal third of the vessel segment. Proximal RCA: There was a discrete 20 % stenosis at a site with no prior intervention. Mid RCA: There was a discrete 20 % stenosis. Distal RCA: There was a 20 % stenosis. DM (diabetes mellitus) ([facility] ICD-10-CM: E11.9  ICD-9-CM: 250.00  2012                 Plan:     1. CAD/LCx ALETHEA, residual LAD and obtuse marginal lesions: Continue aspirin. It is more than 1 years since she has been taking Plavix and since her ALETHEA stent. According to guidelines we can now consider stopping the Plavix and just continuing the aspirin. I discussed this with her and she is willing to proceed for that.   2. Dyslipidemia: Continue Crestor. Continue diet control. No new lipid profile. 3. Hypertension: Blood pressure is controlled. Continue current medications. 4. History of Takotsubo cardiomyopathy. LVEF has improved to near normal now. Continue current medications including Bumex 0.5 mg p.o. twice daily. 5. Follow-up in 1 year. Subjective:     Ramandeep Menon, a 68y.o. year-old who presents for followup. She has known history of CAD, LCx ALETHEA with residual LAD and obtuse marginal disease. She underwent PCI of the LCx in February 2017. She is being doing well since then. She denies any symptoms of chest pain or shortness of breath. She also has background history of dyslipidemia, hypertension, Takotsubo cardiomyopathy, diabetes. She recently underwent bilateral corneal transplant and is doing well from that standpoint. She has mild bilateral ankle edema which is chronic with some fluctuation on the right ankle. She denies any significant bleeding however skin bruising and spontaneous skin bruising is present occasionally.     Exam:     Physical Exam:  Visit Vitals    /66 (BP 1 Location: Left arm, BP Patient Position: Sitting)    Pulse 72    Resp 18    Ht 5' 3\" (1.6 m)    Wt 181 lb 9.6 oz (82.4 kg)    SpO2 97%    BMI 32.17 kg/m2     General appearance - alert, well appearing, and in no distress  Mental status - affect appropriate to mood  Eyes - sclera anicteric, moist mucous membranes  Neck - supple, no significant adenopathy  Chest - clear to auscultation, no wheezes, rales or rhonchi  Heart - normal rate, regular rhythm, normal S1, S2, no murmurs, rubs, clicks or gallops  Abdomen - soft, nontender, nondistended, no masses or organomegaly  Extremities - peripheral pulses normal, no pedal edema  Skin - normal coloration  no rashes    Medications:     Current Outpatient Prescriptions   Medication Sig    lisinopril (PRINIVIL, ZESTRIL) 10 mg tablet TAKE 1 TABLET DAILY    pregabalin (LYRICA) 150 mg capsule Take 1 Cap by mouth two (2) times a day. Max Daily Amount: 300 mg. Indications: FIBROMYALGIA    rosuvastatin (CRESTOR) 20 mg tablet TAKE 1 TABLET NIGHTLY    carvedilol (COREG) 3.125 mg tablet TAKE 1 TABLET TWICE A DAY    bumetanide (BUMEX) 0.5 mg tablet TAKE 1 TAB BY MOUTH DAILY.  clopidogrel (PLAVIX) 75 mg tab Take 1 Tab by mouth daily.  Omega-3-DHA-EPA-Fish Oil (FISH OIL) 1,000 mg (120 mg-180 mg) cap Take 1 Cap by mouth daily.  acetaminophen (TYLENOL) 500 mg tablet Take 1,000 mg by mouth every six (6) hours as needed for Pain.  co-enzyme Q-10 (CO Q-10) 50 mg capsule Take 100 mg by mouth nightly.  metFORMIN (GLUCOPHAGE) 1,000 mg tablet Take 1 Tab by mouth two (2) times daily (with meals). (Patient taking differently: Take 500 mg by mouth two (2) times daily (with meals). )    aspirin 81 mg chewable tablet Take 81 mg by mouth nightly.  therapeutic multivitamin (THERAGRAN) tablet Take 1 Tab by mouth daily.  traMADol (ULTRAM) 50 mg tablet Take 50 mg by mouth every six (6) hours as needed for Pain. Patient will take 2 tabs every 8 hours as needed (0900, 1600, 2100)    fluticasone (FLONASE) 50 mcg/actuation nasal spray 1 [place] by Both Nostrils route nightly.  fenofibric acid (TRILIPIX) 135 mg capsule Take 135 mg by mouth nightly. No current facility-administered medications for this visit. Diagnostic Data Review:       6/30/17 Echo:EF 60% AV trileaflets,Leaflets exhibited sclerosis w/o stenosis. 3/2/17 Carotid Duplex: 10-49% stenosis of the rt internal carotid and 10-49% stenosis of the lt internal carotid. Vertebrals are patent with antegrade flow. Brachial pressures are  symmetric. 2/16/2017: CATH- LCX 70% FFR + 0.79   --- s/p ALETHEA (2.25x23)-> LCX  2/17/2017: ECHO- EF 35-40%, mid-distal AS/Septal/apical AK   12/4/13: STRESS NUC- Normal perfusion, LVEF- 80%. 3/22/13: ECHO- Normal LV function.    12/11/12: CATH- LAD: m30%, d: 20%, LCX: p60%, m:60%, OM1: 20%, RCA: p20%, m20%, d20%   2012: ECHO- EF 40%, Takotsubo Cardiomyopathy. Mild LVH      Lab Review:     Lab Results   Component Value Date/Time    Cholesterol, total 116 02/17/2017 04:34 AM    HDL Cholesterol 54 02/17/2017 04:34 AM    LDL, calculated 34.2 02/17/2017 04:34 AM    Triglyceride 139 02/17/2017 04:34 AM    CHOL/HDL Ratio 2.1 02/17/2017 04:34 AM     Lab Results   Component Value Date/Time    Creatinine 1.19 (H) 01/21/2018 02:14 PM     Lab Results   Component Value Date/Time    BUN 26 (H) 01/21/2018 02:14 PM     Lab Results   Component Value Date/Time    Potassium 4.4 01/21/2018 02:14 PM     Lab Results   Component Value Date/Time    Hemoglobin A1c 5.8 02/17/2017 04:34 AM     Lab Results   Component Value Date/Time    HGB 12.2 01/21/2018 02:14 PM     Lab Results   Component Value Date/Time    PLATELET 795 13/77/8424 02:14 PM     No results for input(s): CPK, CKMB, TROIQ in the last 72 hours. No lab exists for component: CKQMB, CPKMB             ___________________________________________________    Geremias Gonzalez.  Jake Escobar MD, [facility]

## 2018-06-04 DIAGNOSIS — E78.5 DYSLIPIDEMIA: ICD-10-CM

## 2018-06-04 RX ORDER — ROSUVASTATIN CALCIUM 20 MG/1
TABLET, COATED ORAL
Qty: 90 TAB | Refills: 1 | Status: SHIPPED | OUTPATIENT
Start: 2018-06-04 | End: 2018-12-01 | Stop reason: SDUPTHER

## 2018-06-07 RX ORDER — CARVEDILOL 3.12 MG/1
TABLET ORAL
Qty: 180 TAB | Refills: 3 | Status: SHIPPED | OUTPATIENT
Start: 2018-06-07 | End: 2019-05-19 | Stop reason: SDUPTHER

## 2018-08-07 ENCOUNTER — HOSPITAL ENCOUNTER (OUTPATIENT)
Dept: NUCLEAR MEDICINE | Age: 78
Discharge: HOME | End: 2018-08-07
Attending: PHYSICAL MEDICINE & REHABILITATION
Payer: MEDICARE

## 2018-08-07 DIAGNOSIS — M84.48XA SACRAL INSUFFICIENCY FRACTURE: ICD-10-CM

## 2018-08-07 PROCEDURE — 78315 BONE IMAGING 3 PHASE: CPT

## 2018-08-27 NOTE — ED TRIAGE NOTES
Pt went to PCP for her medication adjustment (lisinopril) and sent to ED for elevated blood pressure and chest discomfort non radiating. Abdomen soft, non-tender, no guarding.

## 2018-10-23 ENCOUNTER — TELEPHONE (OUTPATIENT)
Dept: CARDIOLOGY CLINIC | Age: 78
End: 2018-10-23

## 2018-10-23 NOTE — TELEPHONE ENCOUNTER
Pt calling because [facility] needs a prior authorization for her medication, fenofibric acid. Pt can be reached @ [phone].      Thanks

## 2018-10-23 NOTE — TELEPHONE ENCOUNTER
Return call placed to pt. LVM for pt. Dr. Catrachito Delaney does not prescribe her fenofibric acid. Pt will need to call MD that prescribes the medication for PA to be done.

## 2018-11-15 RX ORDER — BUMETANIDE 0.5 MG/1
TABLET ORAL
Qty: 90 TAB | Refills: 2 | Status: SHIPPED | OUTPATIENT
Start: 2018-11-15 | End: 2019-07-24 | Stop reason: SDUPTHER

## 2018-11-19 ENCOUNTER — TELEPHONE (OUTPATIENT)
Dept: CARDIOLOGY CLINIC | Age: 78
End: 2018-11-19

## 2018-11-19 RX ORDER — FENOFIBRIC ACID 135 MG/1
135 CAPSULE, DELAYED RELEASE ORAL
Qty: 90 CAP | Refills: 2 | Status: SHIPPED | OUTPATIENT
Start: 2018-11-19 | End: 2019-07-24 | Stop reason: SDUPTHER

## 2018-11-19 NOTE — TELEPHONE ENCOUNTER
Refill of fenofibric acid completed per VO of Dr. Raul Evans.     Last OV: 3/26/18  Next OV: 3/28/19

## 2018-11-19 NOTE — TELEPHONE ENCOUNTER
Pt called requesting an extension on her Fenofibrick acid 135mg to be called into [facility] on [address].   Phone # [phone]  Thanks

## 2018-11-26 RX ORDER — LISINOPRIL 10 MG/1
TABLET ORAL
Qty: 90 TAB | Refills: 2 | Status: SHIPPED | OUTPATIENT
Start: 2018-11-26 | End: 2019-07-24 | Stop reason: SDUPTHER

## 2018-12-01 DIAGNOSIS — E78.5 DYSLIPIDEMIA: ICD-10-CM

## 2018-12-02 RX ORDER — ROSUVASTATIN CALCIUM 20 MG/1
TABLET, COATED ORAL
Qty: 90 TAB | Refills: 1 | Status: SHIPPED | OUTPATIENT
Start: 2018-12-02 | End: 2018-12-04 | Stop reason: SDUPTHER

## 2018-12-04 DIAGNOSIS — E78.5 DYSLIPIDEMIA: ICD-10-CM

## 2018-12-04 RX ORDER — ROSUVASTATIN CALCIUM 20 MG/1
TABLET, COATED ORAL
Qty: 90 TAB | Refills: 3 | Status: SHIPPED | OUTPATIENT
Start: 2018-12-04 | End: 2020-02-05

## 2018-12-04 NOTE — TELEPHONE ENCOUNTER
Pt is requesting a refill for the selected medication. Pt stated that she is all out of the selected medication. Pharmacy confirmed. Phone# [phone]  Thanks.

## 2018-12-04 NOTE — TELEPHONE ENCOUNTER
Refill of rosuvastatin 20mg daily completed per VO of Dr. Manuela Cabrera.     Last OV: 3/2018  Next OV: 3/2019

## 2019-05-22 RX ORDER — CARVEDILOL 3.12 MG/1
3.12 TABLET ORAL 2 TIMES DAILY
Qty: 90 TAB | Refills: 0 | Status: SHIPPED | OUTPATIENT
Start: 2019-05-22 | End: 2019-07-24 | Stop reason: SDUPTHER

## 2019-07-24 ENCOUNTER — OFFICE VISIT (OUTPATIENT)
Dept: CARDIOLOGY CLINIC | Age: 79
End: 2019-07-24

## 2019-07-24 VITALS
RESPIRATION RATE: 18 BRPM | OXYGEN SATURATION: 98 % | HEIGHT: 63 IN | SYSTOLIC BLOOD PRESSURE: 140 MMHG | DIASTOLIC BLOOD PRESSURE: 78 MMHG | WEIGHT: 180 LBS | HEART RATE: 84 BPM | BODY MASS INDEX: 31.89 KG/M2

## 2019-07-24 DIAGNOSIS — I25.10 CORONARY ARTERY DISEASE DUE TO LIPID RICH PLAQUE: Primary | ICD-10-CM

## 2019-07-24 DIAGNOSIS — I77.9 BILATERAL CAROTID ARTERY DISEASE, UNSPECIFIED TYPE (HCC): ICD-10-CM

## 2019-07-24 DIAGNOSIS — I25.83 CORONARY ARTERY DISEASE DUE TO LIPID RICH PLAQUE: Primary | ICD-10-CM

## 2019-07-24 DIAGNOSIS — I10 ESSENTIAL HYPERTENSION: ICD-10-CM

## 2019-07-24 RX ORDER — CARVEDILOL 3.12 MG/1
3.12 TABLET ORAL 2 TIMES DAILY
Qty: 180 TAB | Refills: 3 | Status: SHIPPED | OUTPATIENT
Start: 2019-07-24 | End: 2020-05-27 | Stop reason: SDUPTHER

## 2019-07-24 RX ORDER — LISINOPRIL 10 MG/1
TABLET ORAL
Qty: 90 TAB | Refills: 3 | Status: SHIPPED | OUTPATIENT
Start: 2019-07-24 | End: 2020-08-11

## 2019-07-24 RX ORDER — BUMETANIDE 0.5 MG/1
TABLET ORAL
Qty: 90 TAB | Refills: 3 | Status: SHIPPED | OUTPATIENT
Start: 2019-07-24 | End: 2020-06-04

## 2019-07-24 RX ORDER — FENOFIBRIC ACID 135 MG/1
135 CAPSULE, DELAYED RELEASE ORAL
Qty: 90 CAP | Refills: 3 | Status: SHIPPED | OUTPATIENT
Start: 2019-07-24 | End: 2020-10-20 | Stop reason: SDUPTHER

## 2019-07-24 NOTE — PROGRESS NOTES
Office Follow-up    NAME: Kaycee Hernandez   :  1940  MRM:  [id number]    Date:  2019            Assessment:     Problem List  Date Reviewed: 2019          Codes Class Noted    Type 2 diabetes with nephropathy ([facility] ICD-10-CM: E11.21  ICD-9-CM: 250.40, 583.81  3/26/2018        SOB (shortness of breath) ICD-10-CM: R06.02  ICD-9-CM: 786.05          Systolic CHF, acute ([facility] ICD-10-CM: I50.21  ICD-9-CM: 428.21, 428.0  2017        NSTEMI (non-ST elevated myocardial infarction) ([facility] ICD-10-CM: I21.4  ICD-9-CM: 410.70  2017        Chest pain ICD-10-CM: R07.9  ICD-9-CM: 786.50  2/15/2017        HTN (hypertension) ICD-10-CM: I10  ICD-9-CM: 401.9  2/15/2017        Dyslipidemia ICD-10-CM: E78.5  ICD-9-CM: 272.4  2015        Takotsubo cardiomyopathy ICD-10-CM: I51.81  ICD-9-CM: 429.83  2012        CAD (coronary artery disease) ICD-10-CM: I25.10  ICD-9-CM: 414.00  2012    Overview Addendum 2012 12:20 PM by Isabel Beal MD     CATH 12  Mid LAD: There was a diffuse 30 % stenosis. Distal LAD: There was a diffuse 20 % stenosis. Proximal circumflex: There was a discrete 60 % stenosis. Mid circumflex: There was a diffuse 60 % stenosis. 1st obtuse marginal: There was a discrete 20 % stenosis in the proximal third of the vessel segment. Proximal RCA: There was a discrete 20 % stenosis at a site with no prior intervention. Mid RCA: There was a discrete 20 % stenosis. Distal RCA: There was a 20 % stenosis. DM (diabetes mellitus) ([facility] ICD-10-CM: E11.9  ICD-9-CM: 250.00  2012                 Plan:     1. CAD/LCx ALETHEA, residual LAD and obtuse marginal lesions: Continue Aspirin and Crestor. 2. Dyslipidemia: Continue Crestor. Continue diet control. No new lipid profile. 3. Hypertension: Blood pressure is controlled. Continue current medications. 4. History of Takotsubo cardiomyopathy. LVEF has improved to near normal now. Continue current medications including Bumex 0.5 mg p.o. twice daily. 5. Murmur: Check Echo in 1 year. 6. Carotid Dz: Check b/l Carotid US. 7. Follow-up in 1 year. Subjective:     Pratibha Benavides, a 78y.o. year-old who presents for followup. She has known history of CAD, LCx ALETHEA with residual LAD and obtuse marginal disease. She underwent PCI of the LCx in February 2017. She is returning today for follow-up. She has not had any new symptoms of chest pain, shortness of breath. She underwent bilateral cornea transplant as well as cataract surgery and did well with it. Her vision is not improved. She has background history of dyslipidemia, hypertension, Takotsubo cardiomyopathy and diabetes. We stopped her Plavix last year after completing 1 year of treatment. EKG demonstrated normal sinus rhythm with isolated 1 PVC. Exam:     Physical Exam:  Visit Vitals  /78 (BP 1 Location: Right arm, BP Patient Position: Sitting)   Resp 18   Ht 5' 3\" (1.6 m)   Wt 180 lb (81.6 kg)   SpO2 98%   BMI 31.89 kg/m²     General appearance - alert, well appearing, and in no distress  Mental status - affect appropriate to mood  Eyes - sclera anicteric, moist mucous membranes  Neck - supple, no significant adenopathy  Chest - clear to auscultation, no wheezes, rales or rhonchi  Heart - normal rate, regular rhythm, normal S1, S2, ESM grade 3/6 in left parasternal region. No rubs, clicks or gallops  Abdomen - soft, nontender, nondistended, no masses or organomegaly  Extremities - peripheral pulses normal, Trace pedal edema  Skin - normal coloration  no rashes    Medications:     Current Outpatient Medications   Medication Sig    carvedilol (COREG) 3.125 mg tablet Take 1 Tab by mouth two (2) times a day. Appointment require for refills.     rosuvastatin (CRESTOR) 20 mg tablet TAKE 1 TABLET NIGHTLY    lisinopril (PRINIVIL, ZESTRIL) 10 mg tablet TAKE 1 TABLET DAILY    fenofibric acid (TRILIPIX) 135 mg capsule Take 1 Cap by mouth nightly.  bumetanide (BUMEX) 0.5 mg tablet TAKE 1 TABLET DAILY    pregabalin (LYRICA) 150 mg capsule Take 1 Cap by mouth two (2) times a day. Max Daily Amount: 300 mg. Indications: FIBROMYALGIA    Omega-3-DHA-EPA-Fish Oil (FISH OIL) 1,000 mg (120 mg-180 mg) cap Take 1 Cap by mouth daily.  acetaminophen (TYLENOL) 500 mg tablet Take 1,000 mg by mouth every six (6) hours as needed for Pain.  co-enzyme Q-10 (CO Q-10) 50 mg capsule Take 100 mg by mouth nightly.  metFORMIN (GLUCOPHAGE) 1,000 mg tablet Take 1 Tab by mouth two (2) times daily (with meals). (Patient taking differently: Take 500 mg by mouth daily.)    aspirin 81 mg chewable tablet Take 81 mg by mouth nightly.  therapeutic multivitamin (THERAGRAN) tablet Take 1 Tab by mouth daily.  traMADol (ULTRAM) 50 mg tablet Take 50 mg by mouth every six (6) hours as needed for Pain. Patient will take 2 tabs every 8 hours as needed (0900, 1600, 2100)    fluticasone (FLONASE) 50 mcg/actuation nasal spray 1 [place] by Both Nostrils route nightly.  clopidogrel (PLAVIX) 75 mg tab Take 1 Tab by mouth daily. No current facility-administered medications for this visit. Diagnostic Data Review:       6/30/17: ECHO- EF 60% AV trileaflets,Leaflets exhibited sclerosis w/o stenosis. 3/2/17: Carotid Duplex- 10-49% stenosis of the rt internal carotid and 10-49% stenosis of the lt internal carotid. 2/16/2017: CATH- LCX 70% FFR + 0.79   --- s/p ALETHEA (2.25x23)-> LCX  2/17/2017: ECHO- EF 35-40%, mid-distal AS/Septal/apical AK     12/4/13: STRESS NUC- Normal perfusion, LVEF- 80%. 3/22/13: ECHO- Normal LV function. 12/11/12: CATH- LAD: m30%, d: 20%, LCX: p60%, m:60%, OM1: 20%, RCA: p20%, m20%, d20%   2012: ECHO- EF 40%, Takotsubo Cardiomyopathy.  Mild LVH      Lab Review:     Lab Results   Component Value Date/Time    Cholesterol, total 116 02/17/2017 04:34 AM    HDL Cholesterol 54 02/17/2017 04:34 AM    LDL, calculated 34.2 02/17/2017 04:34 AM    Triglyceride 139 02/17/2017 04:34 AM    CHOL/HDL Ratio 2.1 02/17/2017 04:34 AM     Lab Results   Component Value Date/Time    Creatinine 1.19 (H) 01/21/2018 02:14 PM     Lab Results   Component Value Date/Time    BUN 26 (H) 01/21/2018 02:14 PM     Lab Results   Component Value Date/Time    Potassium 4.4 01/21/2018 02:14 PM     Lab Results   Component Value Date/Time    Hemoglobin A1c 5.8 02/17/2017 04:34 AM     Lab Results   Component Value Date/Time    HGB 12.2 01/21/2018 02:14 PM     Lab Results   Component Value Date/Time    PLATELET 428 50/35/4790 02:14 PM     No results for input(s): CPK, CKMB, TROIQ in the last 72 hours. No lab exists for component: CKQMB, CPKMB             ___________________________________________________    Fan Mariana.  Daniel Bolivar MD, [facility]

## 2019-07-24 NOTE — PROGRESS NOTES
Chief Complaint   Patient presents with    Follow-up     Annual    Coronary Artery Disease    Hypertension     Visit Vitals  /78 (BP 1 Location: Right arm, BP Patient Position: Sitting)   Pulse 84   Resp 18   Ht 5' 3\" (1.6 m)   Wt 180 lb (81.6 kg)   SpO2 98%   BMI 31.89 kg/m²       Patient presents in office without complaint. No recent hospital visits. Needs refills on meds. Carotid and Echo entered per VO of Dr. Danuta Chaudhari. Dx: Carotid Dz      Refill of cardiac meds completed per VO of Brianna Koch.

## 2019-08-09 LAB
LEFT CCA DIST DIAS: 17 CENTIMETER/SECOND
LEFT CCA DIST SYS: 67 CENTIMETER/SECOND
LEFT CCA PROX DIAS: 16.1 CENTIMETER/SECOND
LEFT CCA PROX SYS: 80.8 CENTIMETER/SECOND
LEFT ECA DIAS: 7.15 CENTIMETER/SECOND
LEFT ECA SYS: 80 CENTIMETER/SECOND
LEFT ICA DIST DIAS: 15.9 CENTIMETER/SECOND
LEFT ICA DIST SYS: 52.9 CENTIMETER/SECOND
LEFT ICA MID DIAS: 17.3 CENTIMETER/SECOND
LEFT ICA MID SYS: 67.7 CENTIMETER/SECOND
LEFT ICA PROX DIAS: 17.6 CENTIMETER/SECOND
LEFT ICA PROX SYS: 60.2 CENTIMETER/SECOND
LEFT ICA/CCA SYS: 0.84
LEFT VERTEBRAL SYS: 31 CENTIMETER/SECOND
RIGHT CCA DIST DIAS: 17.7 CENTIMETER/SECOND
RIGHT CCA DIST SYS: 64.5 CENTIMETER/SECOND
RIGHT CCA PROX DIAS: 7.8 CENTIMETER/SECOND
RIGHT CCA PROX SYS: 78.6 CENTIMETER/SECOND
RIGHT ECA DIAS: 10.67 CENTIMETER/SECOND
RIGHT ECA SYS: 137.2 CENTIMETER/SECOND
RIGHT ICA DIST DIAS: 16.5 CENTIMETER/SECOND
RIGHT ICA DIST SYS: 60.1 CENTIMETER/SECOND
RIGHT ICA MID DIAS: 15.6 CENTIMETER/SECOND
RIGHT ICA MID SYS: 65 CENTIMETER/SECOND
RIGHT ICA PROX DIAS: 11.8 CENTIMETER/SECOND
RIGHT ICA PROX SYS: 69.8 CENTIMETER/SECOND
RIGHT ICA/CCA SYS: 0.9
RIGHT VERTEBRAL DIAS: 8.92 CENTIMETER/SECOND
RIGHT VERTEBRAL SYS: 40 CENTIMETER/SECOND

## 2019-08-15 ENCOUNTER — TELEPHONE (OUTPATIENT)
Dept: CARDIOLOGY CLINIC | Age: 79
End: 2019-08-15

## 2019-08-15 NOTE — TELEPHONE ENCOUNTER
Return call placed to pt. M for pt to call back for further instruction. RESULTS: Normal. Continue current. Follow up as scheduled.

## 2019-09-10 RX ORDER — FENOFIBRIC ACID 135 MG/1
CAPSULE, DELAYED RELEASE ORAL
Qty: 90 CAP | Refills: 2 | Status: SHIPPED | OUTPATIENT
Start: 2019-09-10 | End: 2020-10-20 | Stop reason: SDUPTHER

## 2019-09-10 NOTE — TELEPHONE ENCOUNTER
Refill of fenofibric acid 135 mg daily completed per VO of Dr. Nicolas Bailey.     Last OV: 7/2019  Next OV: 8/2020

## 2020-01-29 DIAGNOSIS — E78.5 DYSLIPIDEMIA: ICD-10-CM

## 2020-02-05 RX ORDER — ROSUVASTATIN CALCIUM 20 MG/1
TABLET, COATED ORAL
Qty: 90 TAB | Refills: 1 | Status: SHIPPED | OUTPATIENT
Start: 2020-02-05 | End: 2020-08-11

## 2020-02-05 NOTE — TELEPHONE ENCOUNTER
Per VO of Dr. Deisy Baldwin: 7/24/2019    Future Appointments   Date Time Provider Zaira Roldan   8/28/2020  2:00 PM Katarzyna BANKS   8/28/2020  3:00 PM Miesha Koch MD [address] [facility]       Requested Prescriptions     Signed Prescriptions Disp Refills    rosuvastatin (CRESTOR) 20 mg tablet 90 Tab 1     Sig: TAKE 1 TABLET NIGHTLY     Authorizing Provider: Akira Ivan     Ordering User: Janette Camacho

## 2020-05-24 ENCOUNTER — HOSPITAL ENCOUNTER (EMERGENCY)
Age: 80
Discharge: HOME | End: 2020-05-24
Attending: EMERGENCY MEDICINE | Admitting: EMERGENCY MEDICINE
Payer: MEDICARE

## 2020-05-24 ENCOUNTER — APPOINTMENT (OUTPATIENT)
Dept: GENERAL RADIOLOGY | Age: 80
End: 2020-05-24
Attending: EMERGENCY MEDICINE
Payer: MEDICARE

## 2020-05-24 VITALS
RESPIRATION RATE: 18 BRPM | BODY MASS INDEX: 30.37 KG/M2 | DIASTOLIC BLOOD PRESSURE: 64 MMHG | HEIGHT: 66 IN | OXYGEN SATURATION: 95 % | SYSTOLIC BLOOD PRESSURE: 181 MMHG | TEMPERATURE: 98.9 F | HEART RATE: 107 BPM | WEIGHT: 189 LBS

## 2020-05-24 DIAGNOSIS — E86.0 DEHYDRATION: ICD-10-CM

## 2020-05-24 DIAGNOSIS — R06.02 SOB (SHORTNESS OF BREATH): Primary | ICD-10-CM

## 2020-05-24 LAB
ALBUMIN SERPL-MCNC: 3.4 G/DL (ref 3.5–5)
ALBUMIN/GLOB SERPL: 0.9 {RATIO} (ref 1.1–2.2)
ALP SERPL-CCNC: 45 U/L (ref 45–117)
ALT SERPL-CCNC: 26 U/L (ref 12–78)
ANION GAP SERPL CALC-SCNC: 8 MMOL/L (ref 5–15)
AST SERPL-CCNC: 26 U/L (ref 15–37)
BASOPHILS # BLD: 0 K/UL (ref 0–0.1)
BASOPHILS NFR BLD: 1 % (ref 0–1)
BILIRUB SERPL-MCNC: 0.3 MG/DL (ref 0.2–1)
BNP SERPL-MCNC: 742 PG/ML
BUN SERPL-MCNC: 43 MG/DL (ref 6–20)
BUN/CREAT SERPL: 27 (ref 12–20)
CALCIUM SERPL-MCNC: 9 MG/DL (ref 8.5–10.1)
CHLORIDE SERPL-SCNC: 112 MMOL/L (ref 97–108)
CO2 SERPL-SCNC: 22 MMOL/L (ref 21–32)
COMMENT, HOLDF: NORMAL
CREAT SERPL-MCNC: 1.57 MG/DL (ref 0.55–1.02)
DIFFERENTIAL METHOD BLD: ABNORMAL
EOSINOPHIL # BLD: 0.3 K/UL (ref 0–0.4)
EOSINOPHIL NFR BLD: 3 % (ref 0–7)
ERYTHROCYTE [DISTWIDTH] IN BLOOD BY AUTOMATED COUNT: 14.9 % (ref 11.5–14.5)
GLOBULIN SER CALC-MCNC: 4 G/DL (ref 2–4)
GLUCOSE SERPL-MCNC: 183 MG/DL (ref 65–100)
HCT VFR BLD AUTO: 37.6 % (ref 35–47)
HGB BLD-MCNC: 12.1 G/DL (ref 11.5–16)
IMM GRANULOCYTES # BLD AUTO: 0 K/UL (ref 0–0.04)
IMM GRANULOCYTES NFR BLD AUTO: 0 % (ref 0–0.5)
LYMPHOCYTES # BLD: 3.5 K/UL (ref 0.8–3.5)
LYMPHOCYTES NFR BLD: 44 % (ref 12–49)
MCH RBC QN AUTO: 27.8 PG (ref 26–34)
MCHC RBC AUTO-ENTMCNC: 32.2 G/DL (ref 30–36.5)
MCV RBC AUTO: 86.2 FL (ref 80–99)
MONOCYTES # BLD: 0.5 K/UL (ref 0–1)
MONOCYTES NFR BLD: 7 % (ref 5–13)
NEUTS SEG # BLD: 3.7 K/UL (ref 1.8–8)
NEUTS SEG NFR BLD: 45 % (ref 32–75)
NRBC # BLD: 0 K/UL (ref 0–0.01)
NRBC BLD-RTO: 0 PER 100 WBC
PLATELET # BLD AUTO: 348 K/UL (ref 150–400)
PMV BLD AUTO: 9.6 FL (ref 8.9–12.9)
POTASSIUM SERPL-SCNC: 4.1 MMOL/L (ref 3.5–5.1)
PROT SERPL-MCNC: 7.4 G/DL (ref 6.4–8.2)
RBC # BLD AUTO: 4.36 M/UL (ref 3.8–5.2)
SAMPLES BEING HELD,HOLD: NORMAL
SODIUM SERPL-SCNC: 142 MMOL/L (ref 136–145)
TROPONIN I SERPL-MCNC: <0.05 NG/ML
WBC # BLD AUTO: 8 K/UL (ref 3.6–11)

## 2020-05-24 PROCEDURE — 99284 EMERGENCY DEPT VISIT MOD MDM: CPT

## 2020-05-24 PROCEDURE — 83880 ASSAY OF NATRIURETIC PEPTIDE: CPT

## 2020-05-24 PROCEDURE — 80053 COMPREHEN METABOLIC PANEL: CPT

## 2020-05-24 PROCEDURE — 93005 ELECTROCARDIOGRAM TRACING: CPT

## 2020-05-24 PROCEDURE — 36415 COLL VENOUS BLD VENIPUNCTURE: CPT

## 2020-05-24 PROCEDURE — 85025 COMPLETE CBC W/AUTO DIFF WBC: CPT

## 2020-05-24 PROCEDURE — 84484 ASSAY OF TROPONIN QUANT: CPT

## 2020-05-24 PROCEDURE — 71045 X-RAY EXAM CHEST 1 VIEW: CPT

## 2020-05-24 NOTE — DISCHARGE INSTRUCTIONS
Patient Education            HOLD THE BUMEX FOR 2 DAYS AND INCREASE WATER INTAKE. Dehydration: Care Instructions  Your Care Instructions  Dehydration happens when your body loses too much fluid. This might happen when you do not drink enough water or you lose large amounts of fluids from your body because of diarrhea, vomiting, or sweating. Severe dehydration can be life-threatening. Water and minerals called electrolytes help put your body fluids back in balance. Learn the early signs of fluid loss, and drink more fluids to prevent dehydration. Follow-up care is a key part of your treatment and safety. Be sure to make and go to all appointments, and call your doctor if you are having problems. It's also a good idea to know your test results and keep a list of the medicines you take. How can you care for yourself at home? · To prevent dehydration, drink plenty of fluids, enough so that your urine is light yellow or clear like water. Choose water and other caffeine-free clear liquids until you feel better. If you have kidney, heart, or liver disease and have to limit fluids, talk with your doctor before you increase the amount of fluids you drink. · If you do not feel like eating or drinking, try taking small sips of water, sports drinks, or other rehydration drinks. · Get plenty of rest.  To prevent dehydration  · Add more fluids to your diet and daily routine, unless your doctor has told you not to. · During hot weather, drink more fluids. Drink even more fluids if you exercise a lot. Stay away from drinks with alcohol or caffeine. · Watch for the symptoms of dehydration. These include:  ? A dry, sticky mouth. ? Dark yellow urine, and not much of it. ? Dry and sunken eyes. ? Feeling very tired. · Learn what problems can lead to dehydration. These include:  ? Diarrhea, fever, and vomiting. ? Any illness with a fever, such as pneumonia or the flu. ?  Activities that cause heavy sweating, such as endurance races and heavy outdoor work in hot or humid weather. ? Alcohol or drug use or problems caused by quitting their use (withdrawal). ? Certain medicines, such as cold and allergy pills (antihistamines), diet pills (diuretics), and laxatives. ? Certain diseases, such as diabetes, cancer, and heart or kidney disease. When should you call for help? Call 911 anytime you think you may need emergency care. For example, call if:    · You passed out (lost consciousness).    Call your doctor now or seek immediate medical care if:    · You are confused and cannot think clearly.     · You are dizzy or lightheaded, or you feel like you may faint.     · You have signs of needing more fluids. You have sunken eyes and a dry mouth, and you pass only a little dark urine.     · You cannot keep fluids down.    Watch closely for changes in your health, and be sure to contact your doctor if:    · You are not making tears.     · Your skin is very dry and sags slowly back into place after you pinch it.     · Your mouth and eyes are very dry. Where can you learn more? Go to http://marty-mario.info/  Enter Q814 in the search box to learn more about \"Dehydration: Care Instructions. \"  Current as of: June 26, 2019Content Version: 12.4  © 6427-0735 [employer]. Care instructions adapted under license by [employer] (which disclaims liability or warranty for this information). If you have questions about a medical condition or this instruction, always ask your healthcare professional. [place] [address] any warranty or liability for your use of this information.

## 2020-05-24 NOTE — ED NOTES
Patient discharged by provider. D/C instructions given. Pt educated to take r medications as instructed for management at home. Pt verbalized understanding, verbalized no questions. PIV removed, pressure dressing applied. Pt ambulated out of ER without difficulty, NAD.   Patient Vitals for the past 4 hrs:   Temp Pulse Resp BP SpO2   05/24/20 1650     95 %   05/24/20 1633 98.9 °F (37.2 °C) (!) 107 18 181/64 (!) 89 %

## 2020-05-24 NOTE — ED TRIAGE NOTES
Pt states she has been feeling more short of breath over the last 4 days. Hx of cardiac stents. Pt also c/o of weakness.

## 2020-05-24 NOTE — ED PROVIDER NOTES
HPI the patient has a several day history of shortness of breath/dyspnea on exertion. She is concerned that it could be due to her heart. She denies having chest pain, fever, cough, vomiting, syncope or other complaints.     Past Medical History:   Diagnosis Date    Arthritis     CAD (coronary artery disease)     NSTEMI with PCI LCX on 2/16/17    Cardiomyopathy ([facility]     ECHO 2/17/2017; EF 35-40%, mid-distal AS/Septal/apical AK     Diabetes mellitus ([facility]     Fibromyalgia     High cholesterol     Hypertension     NSTEMI (non-ST elevated myocardial infarction) ([facility]     NSTEMI with PCI LCX on 2/16/17       Past Surgical History:   Procedure Laterality Date    HX CORONARY STENT PLACEMENT  02/15/2017    HX GYN      hysterectomy    HX ORTHOPAEDIC           Family History:   Problem Relation Age of Onset    Heart Attack Father     High Cholesterol Father        Social History     Socioeconomic History    Marital status:      Spouse name: Not on file    Number of children: Not on file    Years of education: Not on file    Highest education level: Not on file   Occupational History    Not on file   Social Needs    Financial resource strain: Not on file    Food insecurity     Worry: Not on file     Inability: Not on file    Transportation needs     Medical: Not on file     Non-medical: Not on file   Tobacco Use    Smoking status: Never Smoker    Smokeless tobacco: Never Used   Substance and Sexual Activity    Alcohol use: No     Alcohol/week: 0.0 standard drinks    Drug use: No    Sexual activity: Not on file   Lifestyle    Physical activity     Days per week: Not on file     Minutes per session: Not on file    Stress: Not on file   Relationships    Social connections     Talks on phone: Not on file     Gets together: Not on file     Attends Voodoo service: Not on file     Active member of club or organization: Not on file     Attends meetings of clubs or organizations: Not on file Relationship status: Not on file    Intimate partner violence     Fear of current or ex partner: Not on file     Emotionally abused: Not on file     Physically abused: Not on file     Forced sexual activity: Not on file   Other Topics Concern    Not on file   Social History Narrative    Not on file         ALLERGIES: Patient has no known allergies. Review of Systems   Constitutional: Negative for fever. HENT: Negative for voice change. Eyes: Negative for pain. Respiratory: Positive for shortness of breath. Negative for cough. Cardiovascular: Negative for chest pain. Gastrointestinal: Negative for abdominal pain, nausea and vomiting. Genitourinary: Negative for flank pain. Musculoskeletal: Negative for back pain. Skin: Negative for rash. Neurological: Negative for headaches. Psychiatric/Behavioral: Negative for confusion. Vitals:    05/24/20 1633   BP: 181/64   Pulse: (!) 107   Resp: 18   Temp: 98.9 °F (37.2 °C)   SpO2: (!) 89%   Weight: 85.7 kg (189 lb)   Height: 5' 6\" (1.676 m)            Physical Exam  Constitutional:       General: She is not in acute distress. Appearance: She is well-developed. HENT:      Head: Normocephalic. Neck:      Musculoskeletal: Normal range of motion. Cardiovascular:      Rate and Rhythm: Normal rate. Heart sounds: No murmur. Pulmonary:      Effort: Pulmonary effort is normal.      Breath sounds: Normal breath sounds. Abdominal:      Palpations: Abdomen is soft. Tenderness: There is no abdominal tenderness. Musculoskeletal: Normal range of motion. Skin:     General: Skin is warm and dry. Capillary Refill: Capillary refill takes less than 2 seconds. Neurological:      Mental Status: She is alert. Psychiatric:         Behavior: Behavior normal.          MDM       Procedures ED EKG interpretation:  Rhythm: s tach rate 102. nsst changes. This EKG was interpreted by Naman Arvizu MD,ED Provider.       5:35 PM--discussed results with patient. I think her symptoms are mostly related to dehydration and she was advised to hold her Bumex for 2 days and increase her water intake.

## 2020-05-25 ENCOUNTER — PATIENT OUTREACH (OUTPATIENT)
Dept: FAMILY MEDICINE CLINIC | Age: 80
End: 2020-05-25

## 2020-05-26 ENCOUNTER — TELEPHONE (OUTPATIENT)
Dept: CARDIOLOGY CLINIC | Age: 80
End: 2020-05-26

## 2020-05-26 LAB
ATRIAL RATE: 102 BPM
CALCULATED P AXIS, ECG09: 63 DEGREES
CALCULATED R AXIS, ECG10: 2 DEGREES
CALCULATED T AXIS, ECG11: 126 DEGREES
DIAGNOSIS, 93000: NORMAL
P-R INTERVAL, ECG05: 182 MS
Q-T INTERVAL, ECG07: 340 MS
QRS DURATION, ECG06: 80 MS
QTC CALCULATION (BEZET), ECG08: 443 MS
VENTRICULAR RATE, ECG03: 102 BPM

## 2020-05-26 NOTE — TELEPHONE ENCOUNTER
LVM for patient to call back. Tentatively placed on Pennie Buerger, NP schedule for tomorrow at [facility].    No appointments available with Dr. Damian Parikh until 6/1 week.

## 2020-05-26 NOTE — TELEPHONE ENCOUNTER
Patient's  states that patient has not been feeling well over the past couple of weeks. He states she has had an increase in SOB with little exertion (even just walking room to room). He states he took her to ER but was told they couldn't find anything wrong with her. He would like to schedule follow up with Dr. Monica Willams because she is still not feeling well. Please advise.        Phone: [phone]

## 2020-05-27 ENCOUNTER — OFFICE VISIT (OUTPATIENT)
Dept: CARDIOLOGY CLINIC | Age: 80
End: 2020-05-27

## 2020-05-27 VITALS
HEART RATE: 96 BPM | HEIGHT: 66 IN | BODY MASS INDEX: 29.73 KG/M2 | OXYGEN SATURATION: 97 % | SYSTOLIC BLOOD PRESSURE: 158 MMHG | DIASTOLIC BLOOD PRESSURE: 82 MMHG | WEIGHT: 185 LBS

## 2020-05-27 DIAGNOSIS — I25.10 CORONARY ARTERY DISEASE DUE TO LIPID RICH PLAQUE: Primary | ICD-10-CM

## 2020-05-27 DIAGNOSIS — R06.09 DOE (DYSPNEA ON EXERTION): ICD-10-CM

## 2020-05-27 DIAGNOSIS — E78.5 DYSLIPIDEMIA: ICD-10-CM

## 2020-05-27 DIAGNOSIS — Z86.79 HISTORY OF CARDIOMYOPATHY: ICD-10-CM

## 2020-05-27 DIAGNOSIS — I25.83 CORONARY ARTERY DISEASE DUE TO LIPID RICH PLAQUE: Primary | ICD-10-CM

## 2020-05-27 DIAGNOSIS — I10 ESSENTIAL HYPERTENSION: ICD-10-CM

## 2020-05-27 RX ORDER — CARVEDILOL 6.25 MG/1
6.25 TABLET ORAL 2 TIMES DAILY
Qty: 60 TAB | Refills: 0 | Status: SHIPPED | OUTPATIENT
Start: 2020-05-27 | End: 2020-06-04

## 2020-05-27 RX ORDER — DOXYCYCLINE HYCLATE 100 MG
100 TABLET ORAL DAILY
COMMUNITY

## 2020-05-27 RX ORDER — METFORMIN HYDROCHLORIDE 500 MG/1
TABLET ORAL DAILY
COMMUNITY

## 2020-05-27 NOTE — PROGRESS NOTES
DOROTHY Soria  [address] Syed [address] [facility], [address] [place] [address]    Office [phone]  Fax [phone]        NAME: Chely Cody   :  1940  MRM:  [id number]    Date:  2020         Assessment:     Problem List  Date Reviewed: 2019          Codes Class Noted    Type 2 diabetes with nephropathy ([facility] ICD-10-CM: E11.21  ICD-9-CM: 250.40, 583.81  3/26/2018        SOB (shortness of breath) ICD-10-CM: R06.02  ICD-9-CM: 786.05          Systolic CHF, acute ([facility] ICD-10-CM: I50.21  ICD-9-CM: 428.21, 428.0  2017        NSTEMI (non-ST elevated myocardial infarction) ([facility] ICD-10-CM: I21.4  ICD-9-CM: 410.70  2017        Chest pain ICD-10-CM: R07.9  ICD-9-CM: 786.50  2/15/2017        HTN (hypertension) ICD-10-CM: I10  ICD-9-CM: 401.9  2/15/2017        Dyslipidemia ICD-10-CM: E78.5  ICD-9-CM: 272.4  2015        Takotsubo cardiomyopathy ICD-10-CM: I51.81  ICD-9-CM: 429.83  2012        CAD (coronary artery disease) ICD-10-CM: I25.10  ICD-9-CM: 414.00  2012    Overview Addendum 2012 12:20 PM by Darby Albarado MD     CATH 12  Mid LAD: There was a diffuse 30 % stenosis. Distal LAD: There was a diffuse 20 % stenosis. Proximal circumflex: There was a discrete 60 % stenosis. Mid circumflex: There was a diffuse 60 % stenosis. 1st obtuse marginal: There was a discrete 20 % stenosis in the proximal third of the vessel segment. Proximal RCA: There was a discrete 20 % stenosis at a site with no prior intervention. Mid RCA: There was a discrete 20 % stenosis. Distal RCA: There was a 20 % stenosis. DM (diabetes mellitus) ([facility] ICD-10-CM: E11.9  ICD-9-CM: 250.00  2012                 Plan:     CAD/LCx ALETHEA, residual LAD and obtuse marginal lesions: Continue Aspirin and Crestor. Worsening dyspnea / possible anginal equivalent - check kwadwo cardiolite. Increase Coreg to 6.25mg BID.      Dyslipidemia: Continue Crestor and fenofibric acid  Continue diet control. Needs updated lipids next visit if no check by PCP in >1 yr. Hypertension: Blood pressure elevated -increasing Coreg per above. Continue current medications. Hx of Takotsubo cardiomyopathy / Murmur. LVEF improved to 60% on last echo. Check updated echo. Volume appears controlled; recent labs concerning for dehydration. Advised to use Bumex 0.5mg PRN for increased leg swelling or weight gain >3lbs in 1 day or >5lbs in 1 week. Mild Carotid Dz: duplex 8/2019:  0-9% stenosis of the right internal carotid artery, 10-49% stenosis of the left internal carotid artery. F/u in 1 month or PRN       Subjective:     Calli Aguiar, a 78y.o. year-old who presents for eval of worsening dyspnea. She has known history of CAD (LCx ALETHEA with residual LAD and obtuse marginal disease), dyslipidemia, hypertension, Takotsubo cardiomyopathy and diabetes. Pt was seen in ED for SOB on 5/24. EKG revealed sinus tach rate 102. nsst changes. CMP with BUN 43 / Cr 1.57, pro . Symptoms thoughts 2/2 dehydration and she was advised to hold her Bumex (0.5mg/d) and increase her water intake. Pt states dyspnea ongoing; can only walk about 50 feet before stopping to rest.  Symptoms started about 10 days ago. BP elevated compared to baseline. Has leg swelling that is stable despite holding Bumex. Patient denies any chest pain, palpitations, syncope, paroxysmal nocturnal dyspnea.       Exam:     Physical Exam:  Visit Vitals  /82 (BP 1 Location: Left arm, BP Patient Position: Sitting)   Pulse 96   Ht 5' 6\" (1.676 m)   Wt 185 lb (83.9 kg)   SpO2 97%   BMI 29.86 kg/m²     Wt Readings from Last 3 Encounters:   05/27/20 185 lb (83.9 kg)   05/24/20 189 lb (85.7 kg)   08/08/19 180 lb (81.6 kg)     General appearance - alert, well appearing, and in no distress  Mental status - affect appropriate to mood  Neck - supple, no JVD  Chest - clear to auscultation, no wheezes, rales or rhonchi  Heart - normal S1, S2, RRR, ESM grade 2/6. No rubs, clicks or gallops  Abdomen - soft, nontender, nondistended,   Extremities - peripheral pulses normal, Trace pedal edema  Skin - normal coloration  no rashes    Medications:     Current Outpatient Medications   Medication Sig    metFORMIN (GLUCOPHAGE) 500 mg tablet Take  by mouth daily.  doxycycline (VIBRA-TABS) 100 mg tablet Take 100 mg by mouth daily.  rosuvastatin (CRESTOR) 20 mg tablet TAKE 1 TABLET NIGHTLY    fenofibric acid (TRILIPIX ER) 135 mg capsule TAKE 1 CAPSULE BY MOUTH EVERY DAY AT NIGHT    lisinopril (PRINIVIL, ZESTRIL) 10 mg tablet TAKE 1 TABLET DAILY    pregabalin (LYRICA) 150 mg capsule Take 1 Cap by mouth two (2) times a day. Max Daily Amount: 300 mg. Indications: FIBROMYALGIA    Omega-3-DHA-EPA-Fish Oil (FISH OIL) 1,000 mg (120 mg-180 mg) cap Take 1 Cap by mouth daily.  acetaminophen (TYLENOL) 500 mg tablet Take 1,000 mg by mouth every six (6) hours as needed for Pain.  co-enzyme Q-10 (CO Q-10) 50 mg capsule Take 100 mg by mouth nightly.  aspirin 81 mg chewable tablet Take 81 mg by mouth nightly.  therapeutic multivitamin (THERAGRAN) tablet Take 1 Tab by mouth daily.  traMADol (ULTRAM) 50 mg tablet Take 50 mg by mouth every six (6) hours as needed for Pain. Patient will take 2 tabs every 8 hours as needed (0900, 1600, 2100)    fluticasone (FLONASE) 50 mcg/actuation nasal spray 1 Wayan by Both Nostrils route nightly.  carvediloL (COREG) 6.25 mg tablet TAKE 1 TABLET BY MOUTH TWICE A DAY    bumetanide (BUMEX) 0.5 mg tablet TAKE 1 TABLET DAILY    fenofibric acid (TRILIPIX) 135 mg capsule Take 1 Cap by mouth nightly.  metFORMIN (GLUCOPHAGE) 1,000 mg tablet Take 1 Tab by mouth two (2) times daily (with meals). (Patient taking differently: Take 500 mg by mouth daily.)     No current facility-administered medications for this visit.        Diagnostic Data Review:       6/30/17: ECHO- EF 60% AV trileaflets,Leaflets exhibited sclerosis w/o stenosis. 3/2/17: Carotid Duplex- 10-49% stenosis of the rt internal carotid and 10-49% stenosis of the lt internal carotid. 2/16/2017: CATH- LCX 70% FFR + 0.79   --- s/p ALETHEA (2.25x23)-> LCX  2/17/2017: ECHO- EF 35-40%, mid-distal AS/Septal/apical AK     12/4/13: STRESS NUC- Normal perfusion, LVEF- 80%. 3/22/13: ECHO- Normal LV function. 12/11/12: CATH- LAD: m30%, d: 20%, LCX: p60%, m:60%, OM1: 20%, RCA: p20%, m20%, d20%   2012: ECHO- EF 40%, Takotsubo Cardiomyopathy. Mild LVH      Lab Review:    Results for Rex Phalen (MRN [id number]) as of 6/4/2020 12:04   Ref. Range 2/15/2017 16:20 2/20/2017 09:11 5/24/2020 16:51   NT pro-BNP Latest Ref Range: <450 PG/ 4,291 (H) 742 (H)     Lab Results   Component Value Date/Time    Sodium 142 05/24/2020 04:51 PM    Potassium 4.1 05/24/2020 04:51 PM    Chloride 112 (H) 05/24/2020 04:51 PM    CO2 22 05/24/2020 04:51 PM    Anion gap 8 05/24/2020 04:51 PM    Glucose 183 (H) 05/24/2020 04:51 PM    BUN 43 (H) 05/24/2020 04:51 PM    Creatinine 1.57 (H) 05/24/2020 04:51 PM    BUN/Creatinine ratio 27 (H) 05/24/2020 04:51 PM    GFR est AA 39 (L) 05/24/2020 04:51 PM    GFR est non-AA 32 (L) 05/24/2020 04:51 PM    Calcium 9.0 05/24/2020 04:51 PM    Bilirubin, total 0.3 05/24/2020 04:51 PM    Alk.  phosphatase 45 05/24/2020 04:51 PM    Protein, total 7.4 05/24/2020 04:51 PM    Albumin 3.4 (L) 05/24/2020 04:51 PM    Globulin 4.0 05/24/2020 04:51 PM    A-G Ratio 0.9 (L) 05/24/2020 04:51 PM    ALT (SGPT) 26 05/24/2020 04:51 PM     Lab Results   Component Value Date/Time    WBC 8.0 05/24/2020 04:51 PM    HGB 12.1 05/24/2020 04:51 PM    HCT 37.6 05/24/2020 04:51 PM    PLATELET 858 31/96/9664 04:51 PM    MCV 86.2 05/24/2020 04:51 PM     Lab Results   Component Value Date/Time    Cholesterol, total 116 02/17/2017 04:34 AM    HDL Cholesterol 54 02/17/2017 04:34 AM    LDL, calculated 34.2 02/17/2017 04:34 AM    VLDL, calculated 27.8 02/17/2017 04:34 AM    Triglyceride 139 02/17/2017 04:34 AM    CHOL/HDL Ratio 2.1 02/17/2017 04:34 AM     Lab Results   Component Value Date/Time    TSH 2.24 12/10/2012 10:22 PM     Lab Results   Component Value Date/Time    Hemoglobin A1c 5.8 02/17/2017 04:34 AM          ____________________________________________    DOROTHY Garcia

## 2020-05-27 NOTE — LETTER
6/4/20 Patient: Doris Howell YOB: 1940 Date of Visit: 5/27/2020 [address] [place] [address] VIA Facsimile: [phone] Dear 401 [facility], Thank you for referring Ms. Amisha Ennis to [facility] for evaluation. My notes for this consultation are attached. If you have questions, please do not hesitate to call me. I look forward to following your patient along with you.  
 
 
Sincerely, 
 
Jennifer Payne NP

## 2020-05-27 NOTE — PATIENT INSTRUCTIONS
Please increase your coreg to 6.25mg twice daily; I will send a new prescription to your pharmacy. Continue to hold your Bumex; please use it only as needed for increased leg swelling or weight gain >3lbs in 1 day or >5lbs in 1 week. I will schedule you for an echo as well as Ingris Stress test.   
 
Please see me back in 1 month.

## 2020-05-27 NOTE — PROGRESS NOTES
Visit Vitals  /82 (BP 1 Location: Left arm, BP Patient Position: Sitting)   Pulse 96   Ht 5' 6\" (1.676 m)   Wt 185 lb (83.9 kg)   SpO2 97%   BMI 29.86 kg/m²       Chest pain: no  Shortness of breath: yes w/exert? Yes sujey with walking x 10 days  Edema: no  Palpitations: no  Dizziness: yes sometimes       Bumex ER said to stop due to dehydration, patient doesn't drink much water    Not able to exercise.     New diagnosis/Surgeries: no    ER/Hospitalizations:  ER 5-24 , patient has a several day history of shortness of breath/dyspnea on exertion (little)

## 2020-05-29 NOTE — PROGRESS NOTES
Patient contacted regarding recent discharge and COVID-19 risk. Discussed COVID-19 related testing which was not done at this time. Test results were not done. Patient informed of results, if available? no    Care Transition Nurse/ Ambulatory Care Manager contacted the patient by telephone to perform post discharge assessment. Verified name and  with patient as identifiers. Patient has following risk factors of: heart failure and diabetes. CTN/ACM reviewed discharge instructions, medical action plan and red flags related to discharge diagnosis. Reviewed and educated them on any new and changed medications related to discharge diagnosis. Advised obtaining a 90-day supply of all daily and as-needed medications. Education provided regarding infection prevention, and signs and symptoms of COVID-19 and when to seek medical attention with patient who verbalized understanding. Discussed exposure protocols and quarantine from [address] you at higher risk for severe illness  and given an opportunity for questions and concerns. The patient agrees to contact the COVID-19 hotline [phone] or PCP office for questions related to their healthcare. CTN/ACM provided contact information for future reference. From CDC: Are you at higher risk for severe illness?  Wash your hands often.  Avoid close contact (6 feet, which is about two arm lengths) with people who are sick.  Put distance between yourself and other people if COVID-19 is spreading in your community.  Clean and disinfect frequently touched surfaces.  Avoid all cruise travel and non-essential air travel.  Call your healthcare professional if you have concerns about COVID-19 and your underlying condition or if you are sick.     For more information on steps you can take to protect yourself, see CDC's How to Protect Yourself      Patient/family/caregiver given information for Shakira Thibodeaux and agrees to enroll no  Patient's preferred e-mail:  n/a  Patient's preferred phone number: n/a  Based on Loop alert triggers, patient will be contacted by nurse care manager for worsening symptoms. Plan for follow-up call in 7-14 days based on severity of symptoms and risk factors.

## 2020-06-02 ENCOUNTER — TELEPHONE (OUTPATIENT)
Dept: CARDIOLOGY CLINIC | Age: 80
End: 2020-06-02

## 2020-06-02 NOTE — TELEPHONE ENCOUNTER
Left message to confirm appointment(s) for 6-8-2020. [facility]  [facility] 12 noon. No other information given except to follow all instructions. Left call back number [phone] if patient has any questions.

## 2020-06-04 RX ORDER — CARVEDILOL 6.25 MG/1
TABLET ORAL
Qty: 60 TAB | Refills: 0 | Status: SHIPPED | OUTPATIENT
Start: 2020-06-04 | End: 2020-06-17 | Stop reason: SDUPTHER

## 2020-06-04 RX ORDER — BUMETANIDE 0.5 MG/1
0.5 TABLET ORAL AS NEEDED
Qty: 90 TAB | Refills: 0
Start: 2020-06-04 | End: 2021-05-06 | Stop reason: SDUPTHER

## 2020-06-10 ENCOUNTER — PATIENT OUTREACH (OUTPATIENT)
Dept: INTERNAL MEDICINE CLINIC | Age: 80
End: 2020-06-10

## 2020-06-12 ENCOUNTER — TELEPHONE (OUTPATIENT)
Dept: CARDIOLOGY CLINIC | Age: 80
End: 2020-06-12

## 2020-06-18 RX ORDER — CARVEDILOL 6.25 MG/1
TABLET ORAL
Qty: 60 TAB | Refills: 0 | Status: SHIPPED | OUTPATIENT
Start: 2020-06-18 | End: 2020-06-22

## 2020-06-22 RX ORDER — CARVEDILOL 6.25 MG/1
TABLET ORAL
Qty: 60 TAB | Refills: 0 | Status: SHIPPED | OUTPATIENT
Start: 2020-06-22 | End: 2020-07-13

## 2020-06-23 ENCOUNTER — TELEPHONE (OUTPATIENT)
Dept: CARDIOLOGY CLINIC | Age: 80
End: 2020-06-23

## 2020-06-23 NOTE — TELEPHONE ENCOUNTER
Called and reviewed stress / echo results. Pt states she feels better now; tolerating coreg without issue. Does not keep home BP log. Discussed moving f/u appt. She would like to keep appt with Dr. Sabina Baldwin in Aug; will cancel appt with me next month.      06/08/20   ECHO ADULT COMPLETE 06/10/2020 6/10/2020    Narrative · Normal cavity size, systolic function (ejection fraction normal) and   diastolic function. Mildly increased wall thickness. Estimated left   ventricular ejection fraction is 60 - 65%. Visually measured ejection   fraction. No regional wall motion abnormality noted. · Aortic valve sclerosis. Mild aortic valve regurgitation is present. · Pulmonary arterial systolic pressure is 35 mmHg. · Mild tricuspid valve regurgitation is present. Signed by: Dajuan Corado MD     06/08/20   NUCLEAR CARDIAC STRESS TEST 06/21/2020 6/22/2020    Narrative · Normal stress myocardial perfusion imaging without ischemia or   infarction on pharmacological stress test. Normal left ventricular   systolic function with LVEF 73%.   · No EKG changes of ischemia on pharmacological stress test.                  Signed by: Dajuan Corado MD

## 2020-08-03 DIAGNOSIS — E78.5 DYSLIPIDEMIA: ICD-10-CM

## 2020-08-11 DIAGNOSIS — E78.5 DYSLIPIDEMIA: ICD-10-CM

## 2020-08-11 RX ORDER — LISINOPRIL 10 MG/1
TABLET ORAL
Qty: 90 TAB | Refills: 3 | Status: SHIPPED | OUTPATIENT
Start: 2020-08-11 | End: 2021-07-26

## 2020-08-11 RX ORDER — ROSUVASTATIN CALCIUM 20 MG/1
TABLET, COATED ORAL
Qty: 90 TAB | Refills: 3 | Status: SHIPPED | OUTPATIENT
Start: 2020-08-11 | End: 2020-08-11 | Stop reason: SDUPTHER

## 2020-08-11 NOTE — TELEPHONE ENCOUNTER
Pt wants 2 weeks supply to [facility] [address] [facility] and [facility]        Phone:[phone]

## 2020-08-12 RX ORDER — ROSUVASTATIN CALCIUM 20 MG/1
20 TABLET, COATED ORAL
Qty: 14 TAB | Refills: 0 | Status: SHIPPED | OUTPATIENT
Start: 2020-08-12 | End: 2021-07-29

## 2020-10-13 RX ORDER — FENOFIBRIC ACID 135 MG/1
135 CAPSULE, DELAYED RELEASE ORAL
Qty: 90 CAP | Refills: 3 | OUTPATIENT
Start: 2020-10-13

## 2020-10-16 RX ORDER — FENOFIBRIC ACID 135 MG/1
135 CAPSULE, DELAYED RELEASE ORAL
Qty: 90 CAP | Refills: 3 | Status: CANCELLED | OUTPATIENT
Start: 2020-10-16

## 2020-10-16 NOTE — TELEPHONE ENCOUNTER
Patient needing an emergency supply of rx ([facility]ofibric acid [facility]mg) [facility]. Patient would like a call back from nurse. Please advise. Best # [phone]    Requested Prescriptions     Pending Prescriptions Disp Refills    fenofibric acid (Trilipix) 135 mg capsule 90 Cap 3     Sig: Take 1 Cap by mouth nightly. Refused Prescriptions Disp Refills    fenofibric acid (Trilipix) 135 mg capsule 90 Cap 3     Sig: Take 1 Cap by mouth nightly.      Refused By: Breanna Nuñez     Reason for Refusal: Appt required, please call patient

## 2020-10-19 NOTE — TELEPHONE ENCOUNTER
Patient is calling to schedule an appointment to get med refills but would like to speak with a nurse regarding echo first.     Phone : [phone]

## 2020-10-20 RX ORDER — FENOFIBRIC ACID 135 MG/1
CAPSULE, DELAYED RELEASE ORAL
Qty: 30 CAP | Refills: 0 | Status: SHIPPED | OUTPATIENT
Start: 2020-10-20 | End: 2020-11-18

## 2020-10-20 NOTE — TELEPHONE ENCOUNTER
Per VO of Dr. Shea Ing: Visit date not found    Future Appointments   Date Time Provider Zaira Roldan   10/23/2020  1:40 PM Lynda Koch MD [facility]       Requested Prescriptions     Signed Prescriptions Disp Refills    fenofibric acid (TRILIPIX ER) 135 mg capsule 30 Cap 0     Sig: TAKE 1 CAPSULE BY MOUTH EVERY DAY AT NIGHT. PT MUST KEEP SCHEDULED APPOINTMENT FOR FURTHER REFILLS     Authorizing Provider: [facility]     Ordering User: Barb Perez     Refilled for 30 tab.  Pt need to keep appt

## 2020-10-29 RX ORDER — CARVEDILOL 6.25 MG/1
TABLET ORAL
Qty: 180 TAB | Refills: 2 | Status: SHIPPED | OUTPATIENT
Start: 2020-10-29 | End: 2021-04-14 | Stop reason: SDUPTHER

## 2020-11-18 RX ORDER — FENOFIBRIC ACID 135 MG/1
CAPSULE, DELAYED RELEASE ORAL
Qty: 90 CAP | Refills: 1 | Status: SHIPPED | OUTPATIENT
Start: 2020-11-18 | End: 2020-11-19 | Stop reason: SDUPTHER

## 2020-11-18 NOTE — TELEPHONE ENCOUNTER
Per VO of Dr. Linda Gonzalez    LOV: 10/23/2020    No future appointments. Requested Prescriptions     Pending Prescriptions Disp Refills    fenofibric acid (TRILIPIX ER) 135 mg capsule [Pharmacy Med Name: FENOFIBRIC ACID  MG CAP] 30 Cap 0     Sig: TAKE 1 CAPSULE BY MOUTH EVERY DAY AT NIGHT.  PT MUST KEEP SCHEDULED APPOINTMENT FOR FURTHER REFILLS

## 2020-11-19 ENCOUNTER — OFFICE VISIT (OUTPATIENT)
Dept: CARDIOLOGY CLINIC | Age: 80
End: 2020-11-19
Payer: MEDICARE

## 2020-11-19 VITALS
OXYGEN SATURATION: 98 % | SYSTOLIC BLOOD PRESSURE: 139 MMHG | HEART RATE: 73 BPM | BODY MASS INDEX: 31.99 KG/M2 | DIASTOLIC BLOOD PRESSURE: 62 MMHG | WEIGHT: 192 LBS | HEIGHT: 65 IN

## 2020-11-19 DIAGNOSIS — I25.10 CORONARY ARTERY DISEASE DUE TO LIPID RICH PLAQUE: Primary | ICD-10-CM

## 2020-11-19 DIAGNOSIS — I77.9 CAROTID ARTERY DISEASE, UNSPECIFIED LATERALITY, UNSPECIFIED TYPE (HCC): ICD-10-CM

## 2020-11-19 DIAGNOSIS — E78.5 DYSLIPIDEMIA: ICD-10-CM

## 2020-11-19 DIAGNOSIS — I10 ESSENTIAL HYPERTENSION: ICD-10-CM

## 2020-11-19 DIAGNOSIS — I25.83 CORONARY ARTERY DISEASE DUE TO LIPID RICH PLAQUE: Primary | ICD-10-CM

## 2020-11-19 PROCEDURE — G8754 DIAS BP LESS 90: HCPCS | Performed by: INTERNAL MEDICINE

## 2020-11-19 PROCEDURE — 1100F PTFALLS ASSESS-DOCD GE2>/YR: CPT | Performed by: INTERNAL MEDICINE

## 2020-11-19 PROCEDURE — G8752 SYS BP LESS 140: HCPCS | Performed by: INTERNAL MEDICINE

## 2020-11-19 PROCEDURE — 1090F PRES/ABSN URINE INCON ASSESS: CPT | Performed by: INTERNAL MEDICINE

## 2020-11-19 PROCEDURE — G8536 NO DOC ELDER MAL SCRN: HCPCS | Performed by: INTERNAL MEDICINE

## 2020-11-19 PROCEDURE — 99214 OFFICE O/P EST MOD 30 MIN: CPT | Performed by: INTERNAL MEDICINE

## 2020-11-19 PROCEDURE — G8417 CALC BMI ABV UP PARAM F/U: HCPCS | Performed by: INTERNAL MEDICINE

## 2020-11-19 PROCEDURE — 3288F FALL RISK ASSESSMENT DOCD: CPT | Performed by: INTERNAL MEDICINE

## 2020-11-19 PROCEDURE — G8400 PT W/DXA NO RESULTS DOC: HCPCS | Performed by: INTERNAL MEDICINE

## 2020-11-19 PROCEDURE — 93000 ELECTROCARDIOGRAM COMPLETE: CPT | Performed by: INTERNAL MEDICINE

## 2020-11-19 PROCEDURE — G8427 DOCREV CUR MEDS BY ELIG CLIN: HCPCS | Performed by: INTERNAL MEDICINE

## 2020-11-19 PROCEDURE — G8432 DEP SCR NOT DOC, RNG: HCPCS | Performed by: INTERNAL MEDICINE

## 2020-11-19 RX ORDER — FENOFIBRIC ACID 135 MG/1
CAPSULE, DELAYED RELEASE ORAL
Qty: 90 CAP | Refills: 1 | Status: SHIPPED | OUTPATIENT
Start: 2020-11-19 | End: 2020-11-19 | Stop reason: SDUPTHER

## 2020-11-19 RX ORDER — FENOFIBRIC ACID 135 MG/1
CAPSULE, DELAYED RELEASE ORAL
Qty: 90 CAP | Refills: 3 | Status: SHIPPED | OUTPATIENT
Start: 2020-11-19 | End: 2021-06-28 | Stop reason: SDUPTHER

## 2020-11-19 NOTE — PROGRESS NOTES
Chief Complaint   Patient presents with    Coronary Artery Disease    Hypertension    Cholesterol Problem    Other     SKY     Visit Vitals  /62 (BP 1 Location: Left arm, BP Patient Position: Sitting)   Pulse 73   Ht 5' 5\" (1.651 m)   Wt 192 lb (87.1 kg)   SpO2 98%   BMI 31.95 kg/m²     Chest pain denied   SOB with walking  Dizziness denied  Swelling in hands/feet bilateral feet  Recent hospital stays denied

## 2020-11-19 NOTE — PROGRESS NOTES
All orders entered per verbal orders by Dr. Ronnie Morton:      See Dr. Ronnie Morton in 1 year with same day Echo and Carotid Doppler for Carotid dz

## 2020-11-19 NOTE — PROGRESS NOTES
Office Follow-up    NAME: Dom Mancilla   :  1940  MRM:  [id number]    Date:  2020            Assessment:     Problem List  Date Reviewed: 2019          Codes Class Noted    Type 2 diabetes with nephropathy ([facility] ICD-10-CM: E11.21  ICD-9-CM: 250.40, 583.81  3/26/2018        SOB (shortness of breath) ICD-10-CM: R06.02  ICD-9-CM: 786.05  3/16/4914        Systolic CHF, acute ([facility] ICD-10-CM: I50.21  ICD-9-CM: 428.21, 428.0  2017        NSTEMI (non-ST elevated myocardial infarction) ([facility] ICD-10-CM: I21.4  ICD-9-CM: 410.70  2017        Chest pain ICD-10-CM: R07.9  ICD-9-CM: 786.50  2/15/2017        HTN (hypertension) ICD-10-CM: I10  ICD-9-CM: 401.9  2/15/2017        Dyslipidemia ICD-10-CM: E78.5  ICD-9-CM: 272.4  2015        Takotsubo cardiomyopathy ICD-10-CM: I51.81  ICD-9-CM: 429.83  2012        CAD (coronary artery disease) ICD-10-CM: I25.10  ICD-9-CM: 414.00  2012    Overview Addendum 2012 12:20 PM by Leana Means MD     CATH 12  Mid LAD: There was a diffuse 30 % stenosis. Distal LAD: There was a diffuse 20 % stenosis. Proximal circumflex: There was a discrete 60 % stenosis. Mid circumflex: There was a diffuse 60 % stenosis. 1st obtuse marginal: There was a discrete 20 % stenosis in the proximal third of the vessel segment. Proximal RCA: There was a discrete 20 % stenosis at a site with no prior intervention. Mid RCA: There was a discrete 20 % stenosis. Distal RCA: There was a 20 % stenosis. DM (diabetes mellitus) ([facility] ICD-10-CM: E11.9  ICD-9-CM: 250.00  2012                 Plan:     1. CAD/LCx ALETHEA 2017: Stress Nuc was normal in 2020. Residual LAD and obtuse marginal lesions: Continue Aspirin and Crestor. 2. Dyslipidemia: Continue Crestor. Continue diet control. No new lipid profile. 3. Hypertension: Blood pressure is controlled. Continue current medications. 4. History of Takotsubo cardiomyopathy. LVEF has improved to near normal now. Continue current medications including Bumex 0.5 mg p.o. twice daily. 5. Murmur: Check Echo in 1 year. 6. Carotid Dz: Check b/l Carotid US next visit. 7. Follow-up in 1 year with same day Carotid US. Subjective:     Daquan Scott, gilbert [de-identified]y.o. year-old who presents for followup. She has known history of CAD, LCx ALETHEA with residual LAD and obtuse marginal disease. She underwent PCI of the LCx in February 2017. She is returning today for follow-up. She has not had any new symptoms of chest pain, shortness of breath. She has background history of dyslipidemia, hypertension, Takotsubo cardiomyopathy and diabetes. ECG: normal sinus rhythm, normal ST segment, normal axis, normal intervals. Exam:     Physical Exam:  Visit Vitals  /62 (BP 1 Location: Left arm, BP Patient Position: Sitting)   Pulse 73   Ht 5' 5\" (1.651 m)   Wt 192 lb (87.1 kg)   SpO2 98%   BMI 31.95 kg/m²     General appearance - alert, well appearing, and in no distress  Mental status - affect appropriate to mood  Eyes - sclera anicteric, moist mucous membranes  Neck - supple, no significant adenopathy  Chest - clear to auscultation, no wheezes, rales or rhonchi  Heart - normal rate, regular rhythm, normal S1, S2, ESM grade 3/6 in left parasternal region. No rubs, clicks or gallops  Abdomen - soft, nontender, nondistended, no masses or organomegaly  Extremities - peripheral pulses normal, Trace pedal edema  Skin - normal coloration  no rashes    Medications:     Current Outpatient Medications   Medication Sig    fenofibric acid (TRILIPIX ER) 135 mg capsule TAKE 1 CAPSULE BY MOUTH EVERY DAY AT NIGHT.  carvediloL (COREG) 6.25 mg tablet Take 1 tab twice a day    rosuvastatin (CRESTOR) 20 mg tablet Take 1 Tab by mouth nightly.     lisinopriL (PRINIVIL, ZESTRIL) 10 mg tablet TAKE 1 TABLET DAILY    bumetanide (BUMEX) 0.5 mg tablet Take 1 Tab by mouth as needed for Other (weight gain >3lbs in 1 day or >5lbs in 1 week). TAKE 1 TABLET DAILY    metFORMIN (GLUCOPHAGE) 500 mg tablet Take  by mouth daily.  pregabalin (LYRICA) 150 mg capsule Take 1 Cap by mouth two (2) times a day. Max Daily Amount: 300 mg. Indications: FIBROMYALGIA    Omega-3-DHA-EPA-Fish Oil (FISH OIL) 1,000 mg (120 mg-180 mg) cap Take 1 Cap by mouth daily.  acetaminophen (TYLENOL) 500 mg tablet Take 1,000 mg by mouth every six (6) hours as needed for Pain.  co-enzyme Q-10 (CO Q-10) 50 mg capsule Take 100 mg by mouth nightly.  aspirin 81 mg chewable tablet Take 81 mg by mouth nightly.  therapeutic multivitamin (THERAGRAN) tablet Take 1 Tab by mouth daily.  traMADol (ULTRAM) 50 mg tablet Take 50 mg by mouth every six (6) hours as needed for Pain. Patient will take 2 tabs every 8 hours as needed (0900, 1600, 2100)    fluticasone (FLONASE) 50 mcg/actuation nasal spray 1 [place] by Both Nostrils route nightly.  doxycycline (VIBRA-TABS) 100 mg tablet Take 100 mg by mouth daily. No current facility-administered medications for this visit. Diagnostic Data Review:       6/30/17: ECHO- EF 60% AV trileaflets,Leaflets exhibited sclerosis w/o stenosis. 3/2/17: Carotid Duplex- 10-49% stenosis of the rt internal carotid and 10-49% stenosis of the lt internal carotid. 2/16/2017: CATH- LCX 70% FFR + 0.79   --- s/p ALETHEA (2.25x23)-> LCX  2/17/2017: ECHO- EF 35-40%, mid-distal AS/Septal/apical AK     12/4/13: STRESS NUC- Normal perfusion, LVEF- 80%. 3/22/13: ECHO- Normal LV function. 12/11/12: CATH- LAD: m30%, d: 20%, LCX: p60%, m:60%, OM1: 20%, RCA: p20%, m20%, d20%   2012: ECHO- EF 40%, Takotsubo Cardiomyopathy.  Mild LVH      Lab Review:     Lab Results   Component Value Date/Time    Cholesterol, total 116 02/17/2017 04:34 AM    HDL Cholesterol 54 02/17/2017 04:34 AM    LDL, calculated 34.2 02/17/2017 04:34 AM    Triglyceride 139 02/17/2017 04:34 AM    CHOL/HDL Ratio 2.1 02/17/2017 04:34 AM     Lab Results   Component Value Date/Time    Creatinine 1.57 (H) 05/24/2020 04:51 PM     Lab Results   Component Value Date/Time    BUN 43 (H) 05/24/2020 04:51 PM     Lab Results   Component Value Date/Time    Potassium 4.1 05/24/2020 04:51 PM     Lab Results   Component Value Date/Time    Hemoglobin A1c 5.8 02/17/2017 04:34 AM     Lab Results   Component Value Date/Time    HGB 12.1 05/24/2020 04:51 PM     Lab Results   Component Value Date/Time    PLATELET 441 46/56/1728 04:51 PM     No results for input(s): CPK, CKMB, TROIQ in the last 72 hours. No lab exists for component: CKQMB, CPKMB             ___________________________________________________    Leno Aver.  Madison Tim MD, [facility]

## 2020-11-19 NOTE — PROGRESS NOTES
Per VO of Dr. Morales Huge: 10/23/2020    Future Appointments   Date Time Provider Zaira Yuli   11/22/2021  1:00 PM [facility] BS AMB   11/22/2021  2:00 PM [facility] AMB   11/22/2021  3:00 PM Diamond Koch MD [facility] BS AMB       Requested Prescriptions     Signed Prescriptions Disp Refills    fenofibric acid (TRILIPIX ER) 135 mg capsule 90 Cap 3     Sig: TAKE 1 CAPSULE BY MOUTH EVERY DAY AT NIGHT.      THIS WAS SENT TO MAIL ORDER, THE OTHER 1 MONTH SUPPLY SENT TO [facility] PER PT REQUEST

## 2020-11-19 NOTE — PROGRESS NOTES
All orders entered per verbal orders by Dr. Esteban Douglas:    See Dr. Esteban Douglas in 1 year with same day Echo and Carotid Doppler for Carotid dz

## 2021-04-14 RX ORDER — CARVEDILOL 6.25 MG/1
TABLET ORAL
Qty: 180 TAB | Refills: 2 | Status: SHIPPED | OUTPATIENT
Start: 2021-04-14 | End: 2021-11-19

## 2021-05-06 NOTE — TELEPHONE ENCOUNTER
Patient states that she is out of medication and is requesting that a 14 day supply be sent to her local [facility] pharmacy on [address] and a 90 day supply with refills be [facility] to [facility]. Thanks.

## 2021-05-07 RX ORDER — BUMETANIDE 0.5 MG/1
0.5 TABLET ORAL 2 TIMES DAILY
Qty: 30 TAB | Refills: 0 | Status: SHIPPED | OUTPATIENT
Start: 2021-05-07 | End: 2021-05-10 | Stop reason: SDUPTHER

## 2021-05-07 NOTE — TELEPHONE ENCOUNTER
Per VO of Dr. Damian Myers: 11/19/2020    Future Appointments   Date Time Provider Zaira Yuli   11/22/2021  1:00 PM [facility] AMB   11/22/2021  2:00 PM [facility] AMB   11/22/2021  3:00 PM Ita Koch MD [facility] BS AMB       Requested Prescriptions     Pending Prescriptions Disp Refills    bumetanide (BUMEX) 0.5 mg tablet 90 Tab 0     Sig: Take 1 Tab by mouth as needed for Other (weight gain >3lbs in 1 day or >5lbs in 1 week).  TAKE 1 TABLET DAILY

## 2021-05-10 ENCOUNTER — TELEPHONE (OUTPATIENT)
Dept: CARDIOLOGY CLINIC | Age: 81
End: 2021-05-10

## 2021-05-10 RX ORDER — BUMETANIDE 0.5 MG/1
TABLET ORAL
Qty: 180 TAB | Refills: 3 | Status: SHIPPED | OUTPATIENT
Start: 2021-05-10

## 2021-05-10 NOTE — TELEPHONE ENCOUNTER
Per VO of Dr. Anitha Urbina: 11/19/2020    Future Appointments   Date Time Provider Zaira Roldan   11/22/2021  1:00 PM [facility] AMB   11/22/2021  2:00 PM [facility] AMB   11/22/2021  3:00 PM Bell Koch MD [facility] BS AMB       Requested Prescriptions     Pending Prescriptions Disp Refills    bumetanide (BUMEX) 0.5 mg tablet 180 Tab 3     Sig: TAKE 1 TABLET BY MOUTH TWO TIMES A DAY

## 2021-06-28 RX ORDER — FENOFIBRIC ACID 135 MG/1
CAPSULE, DELAYED RELEASE ORAL
Qty: 90 CAPSULE | Refills: 1 | Status: SHIPPED | OUTPATIENT
Start: 2021-06-28 | End: 2021-11-18

## 2021-06-28 NOTE — TELEPHONE ENCOUNTER
Refill per VO of Dr. Sofi Lutz  Last appt: 11/19/2020  Future Appointments   Date Time Provider Zaira Roldan   11/22/2021  1:00 PM [facility] AMB   11/22/2021  2:00 PM [facility] BS AMB   11/22/2021  3:00 PM Dallin Koch MD [facility] AMB       Requested Prescriptions     Pending Prescriptions Disp Refills    fenofibric acid (TRILIPIX ER) 135 mg capsule 90 Capsule 3     Sig: TAKE 1 CAPSULE BY MOUTH EVERY DAY AT NIGHT.

## 2021-06-28 NOTE — TELEPHONE ENCOUNTER
Pharmacy confirmed; patient out of medication; requesting 3 week supply, the rest are coming through [facility]

## 2021-07-26 RX ORDER — LISINOPRIL 10 MG/1
TABLET ORAL
Qty: 90 TABLET | Refills: 1 | Status: SHIPPED | OUTPATIENT
Start: 2021-07-26 | End: 2022-02-07 | Stop reason: SDUPTHER

## 2021-07-26 NOTE — TELEPHONE ENCOUNTER
Refill per VO of Dr. Candace Lopez  Last appt: 11/19/2020  Future Appointments   Date Time Provider Zaira Roldan   11/22/2021  1:00 PM [facility] AMB   11/22/2021  2:00 PM [facility] BS AMB   11/22/2021  3:00 PM Adore Koch MD [facility] AMB       Requested Prescriptions     Pending Prescriptions Disp Refills    lisinopriL (PRINIVIL, ZESTRIL) 10 mg tablet [Pharmacy Med Name: LISINOPRIL TABS 10MG] 90 Tablet 3     Sig: TAKE 1 TABLET DAILY

## 2021-07-29 DIAGNOSIS — E78.5 DYSLIPIDEMIA: ICD-10-CM

## 2021-07-29 RX ORDER — ROSUVASTATIN CALCIUM 20 MG/1
TABLET, COATED ORAL
Qty: 90 TABLET | Refills: 1 | Status: SHIPPED | OUTPATIENT
Start: 2021-07-29 | End: 2022-01-25

## 2021-07-29 NOTE — TELEPHONE ENCOUNTER
Refill per VO of Dr. Khushi Perez  Last appt: 6/19/2020  Future Appointments   Date Time Provider Zaira Yuli   11/22/2021  1:00 PM [facility] AMB   11/22/2021  2:00 PM [facility] AMB   11/22/2021  3:00 PM Ajit Koch MD [facility] AMB       Requested Prescriptions     Pending Prescriptions Disp Refills    rosuvastatin (CRESTOR) 20 mg tablet [Pharmacy Med Name: ROSUVASTATIN TABS 20MG] 90 Tablet 3     Sig: TAKE 1 TABLET NIGHTLY

## 2021-11-18 RX ORDER — FENOFIBRIC ACID 135 MG/1
CAPSULE, DELAYED RELEASE ORAL
Qty: 60 CAPSULE | Refills: 0 | Status: SHIPPED | OUTPATIENT
Start: 2021-11-18 | End: 2022-04-04 | Stop reason: SDUPTHER

## 2021-11-18 NOTE — TELEPHONE ENCOUNTER
Refill per VO of Dr. Samuel Llamas  Last appt: 11/19/2020  Future Appointments   Date Time Provider Zaira Roldan   11/22/2021  1:00 PM [facility]   11/22/2021  2:00 PM [facility]       Requested Prescriptions     Pending Prescriptions Disp Refills    fenofibric acid (TRILIPIX ER) 135 mg capsule [Pharmacy Med Name: FENOFIBRIC ACID DR CAPS 135MG] 90 Capsule 3     Sig: TAKE 1 CAPSULE DAILY AT NIGHT

## 2021-11-19 RX ORDER — CARVEDILOL 6.25 MG/1
TABLET ORAL
Qty: 180 TABLET | Refills: 2 | Status: SHIPPED | OUTPATIENT
Start: 2021-11-19 | End: 2022-06-13 | Stop reason: SDUPTHER

## 2022-01-06 RX ORDER — CARVEDILOL 6.25 MG/1
TABLET ORAL
Qty: 180 TABLET | Refills: 2 | OUTPATIENT
Start: 2022-01-06

## 2022-01-06 RX ORDER — LISINOPRIL 10 MG/1
TABLET ORAL
Qty: 90 TABLET | Refills: 3 | OUTPATIENT
Start: 2022-01-06

## 2022-01-06 NOTE — TELEPHONE ENCOUNTER
Patient is requesting a refill patient is running out of this med    [facility] pharmacy  [phone]

## 2022-01-06 NOTE — TELEPHONE ENCOUNTER
Refill per VO of Dr. Claudell Sewer  Last appt: Visit date not found  Future Appointments   Date Time Provider Zaira Yuli   2/14/2022  2:00 PM [facility] AMB   2/14/2022  3:00 PM [facility] BS AMB   2/14/2022  3:40 PM Xin Koch MD [facility] AMB       Requested Prescriptions     Pending Prescriptions Disp Refills    carvediloL (COREG) 6.25 mg tablet 180 Tablet 2     Sig: TAKE 1 TABLET BY MOUTH TWICE A DAY     Refill was denied because,  REFILLS STILL AVAILABLE AT THE PHARMACY

## 2022-01-06 NOTE — TELEPHONE ENCOUNTER
Refill per VO of Dr. Mundo Almeida  Last appt: 11/19/2020  No future appointments.     Requested Prescriptions     Pending Prescriptions Disp Refills    lisinopriL (PRINIVIL, ZESTRIL) 10 mg tablet [Pharmacy Med Name: LISINOPRIL TABS 10MG] 90 Tablet 3     Sig: TAKE 1 TABLET DAILY (WILL SEND MORE REFILLS AFTER SEEN IN THE OFFICE ON 11/22/21)     Refill was denied because,  PT NEEDS AN APPOINTMENT

## 2022-01-10 RX ORDER — CARVEDILOL 6.25 MG/1
TABLET ORAL
Qty: 180 TABLET | Refills: 2 | OUTPATIENT
Start: 2022-01-10

## 2022-01-10 NOTE — TELEPHONE ENCOUNTER
Refill per VO of Dr. Eduardo Ramos  Last appt: Visit date not found  Future Appointments   Date Time Provider Zaira Roldan   2/14/2022  2:00 PM [facility] AMB   2/14/2022  3:00 PM [facility] AMB   2/14/2022  3:40 PM Mili Koch [place], MD [facility] BS AMB       Requested Prescriptions     Pending Prescriptions Disp Refills    carvediloL (COREG) 6.25 mg tablet 180 Tablet 2     Sig: TAKE 1 TABLET BY MOUTH TWICE A DAY     Refill was denied because,  REFILLS STILL AVAILABLE AT THE PHARMACY

## 2022-01-25 DIAGNOSIS — E78.5 DYSLIPIDEMIA: ICD-10-CM

## 2022-01-25 RX ORDER — ROSUVASTATIN CALCIUM 20 MG/1
TABLET, COATED ORAL
Qty: 30 TABLET | Refills: 0 | Status: SHIPPED | OUTPATIENT
Start: 2022-01-25 | End: 2022-03-02 | Stop reason: SDUPTHER

## 2022-01-25 NOTE — TELEPHONE ENCOUNTER
Refill per VO of Dr. Sadaf Wilson:    Last appt: 6/19/2020    Future Appointments   Date Time Provider Zaira Yuli   2/14/2022  2:00 PM [facility] AMB   2/14/2022  3:00 PM [facility] AMB   2/14/2022  3:40 PM Radha Koch MD [facility] AMB       Requested Prescriptions     Pending Prescriptions Disp Refills    rosuvastatin (CRESTOR) 20 mg tablet [Pharmacy Med Name: ROSUVASTATIN TABS 20MG] 90 Tablet 3     Sig: TAKE 1 TABLET NIGHTLY

## 2022-02-07 ENCOUNTER — TELEPHONE (OUTPATIENT)
Dept: CARDIOLOGY CLINIC | Age: 82
End: 2022-02-07

## 2022-02-07 RX ORDER — LISINOPRIL 10 MG/1
10 TABLET ORAL DAILY
Qty: 30 TABLET | Refills: 0 | Status: SHIPPED | OUTPATIENT
Start: 2022-02-07 | End: 2022-03-01

## 2022-02-07 NOTE — TELEPHONE ENCOUNTER
Refill per VO of Dr. Nikhil Lora:    Last appt: 2020    Future Appointments   Date Time Provider Zaira Yuli   2022  2:00 PM [facility] AMB   2022  3:00 PM [facility] AMB   2022  3:40 PM Rathi, Luverne Gowers, MD [facility] AMB       Requested Prescriptions     Pending Prescriptions Disp Refills    lisinopriL (PRINIVIL, ZESTRIL) 10 mg tablet 90 Tablet 1     Si Tablet daily.

## 2022-02-07 NOTE — TELEPHONE ENCOUNTER
Left message on 2/2/2022 and 2/7/2022 for return with updated insurance information for appointments on 2/14/2022 - if insurance is still [employer] the id number should be the change that needs to be made

## 2022-02-07 NOTE — TELEPHONE ENCOUNTER
Patient's  is calling because his wife needs a refill on her lisinopril 10 mg. Pharmacy is confirmed. Patient is completely out of her medicine.     Patient does have an upcoming apt 2/14/22

## 2022-02-09 ENCOUNTER — TELEPHONE (OUTPATIENT)
Dept: CARDIOLOGY CLINIC | Age: 82
End: 2022-02-09

## 2022-02-09 NOTE — TELEPHONE ENCOUNTER
2/9/2022 if patient returns call to verify new id number, please let her know I was able to obtain it with the insurance company - please advise patient to bring new insurance card to appointment so that it can be scanned to her chart

## 2022-03-01 RX ORDER — LISINOPRIL 10 MG/1
TABLET ORAL
Qty: 35 TABLET | Refills: 0 | Status: SHIPPED | OUTPATIENT
Start: 2022-03-01 | End: 2022-04-01

## 2022-03-01 NOTE — TELEPHONE ENCOUNTER
Refill per VO of Dr. Dorita Butt:    Last appt: 11/19/2020    Future Appointments   Date Time Provider Zaira Yuli   4/1/2022  9:00 AM [facility] AMB   4/1/2022 10:00 AM [facility] AMB   4/1/2022 11:00 AM Mini Koch MD [facility] AMB       Requested Prescriptions     Pending Prescriptions Disp Refills    lisinopriL (PRINIVIL, ZESTRIL) 10 mg tablet [Pharmacy Med Name: LISINOPRIL 10 MG TABLET] 30 Tablet 0     Sig: TAKE 1 TABLET BY MOUTH EVERY DAY

## 2022-03-02 DIAGNOSIS — E78.5 DYSLIPIDEMIA: ICD-10-CM

## 2022-03-02 RX ORDER — ROSUVASTATIN CALCIUM 20 MG/1
TABLET, COATED ORAL
Qty: 30 TABLET | Refills: 0 | Status: SHIPPED | OUTPATIENT
Start: 2022-03-02 | End: 2022-04-04 | Stop reason: SDUPTHER

## 2022-03-02 NOTE — TELEPHONE ENCOUNTER
Refill per VO of Dr. Matthew Shields:    Last appt: 11/19/2020    Future Appointments   Date Time Provider Zaira Roldan   4/1/2022  9:00 AM [facility] AMB   4/1/2022 10:00 AM [facility] BS AMB   4/1/2022 11:00 AM Roldan Koch MD [facility] AMB       Requested Prescriptions     Pending Prescriptions Disp Refills    rosuvastatin (CRESTOR) 20 mg tablet 30 Tablet 0     Sig: TAKE 1 TABLET NIGHTLY

## 2022-03-18 PROBLEM — R07.9 CHEST PAIN: Status: ACTIVE | Noted: 2017-02-15

## 2022-03-18 PROBLEM — I50.21 SYSTOLIC CHF, ACUTE (HCC): Status: ACTIVE | Noted: 2017-02-20

## 2022-03-18 PROBLEM — E11.21 TYPE 2 DIABETES WITH NEPHROPATHY (HCC): Status: ACTIVE | Noted: 2018-03-26

## 2022-03-18 PROBLEM — I21.4 NSTEMI (NON-ST ELEVATED MYOCARDIAL INFARCTION) (HCC): Status: ACTIVE | Noted: 2017-02-17

## 2022-03-19 PROBLEM — R06.02 SOB (SHORTNESS OF BREATH): Status: ACTIVE | Noted: 2017-02-20

## 2022-03-19 PROBLEM — I10 HTN (HYPERTENSION): Status: ACTIVE | Noted: 2017-02-15

## 2022-03-25 DIAGNOSIS — E78.5 DYSLIPIDEMIA: ICD-10-CM

## 2022-03-25 RX ORDER — ROSUVASTATIN CALCIUM 20 MG/1
TABLET, COATED ORAL
Qty: 30 TABLET | Refills: 0 | OUTPATIENT
Start: 2022-03-25

## 2022-03-25 NOTE — TELEPHONE ENCOUNTER
Refill per VO of Dr. Kimber Isbell:    Last appt: 11/19/2020    Future Appointments   Date Time Provider Zaira Yuli   4/1/2022  9:00 AM [facility] AMB   4/1/2022 10:00 AM [facility] BS AMB   4/1/2022 11:00 AM Patrick Koch MD [facility] AMB       Requested Prescriptions     Pending Prescriptions Disp Refills    rosuvastatin (CRESTOR) 20 mg tablet [Pharmacy Med Name: ROSUVASTATIN CALCIUM 20 MG TAB] 30 Tablet 0     Sig: TAKE 1 TABLET BY MOUTH EVERY DAY AT NIGHT     Refill was denied because,  MUST KEEP APPT

## 2022-03-30 ENCOUNTER — TELEPHONE (OUTPATIENT)
Dept: CARDIOLOGY CLINIC | Age: 82
End: 2022-03-30

## 2022-03-30 NOTE — TELEPHONE ENCOUNTER
Patients  calling, patient has been bed ridden with a stomach bug, nauseous, sweating, etc. Is going to see PCP tomorrow morning. Wondering if she should keep scheduled appts Friday 4/1 or reschedule.   Will call back tomorrow to update the situation    Rory Trejo,  [phone]

## 2022-03-31 NOTE — TELEPHONE ENCOUNTER
Spoke with the pt's spouse, Chandni Pablo, identified the pt with name and . He reports the pt has been very ill, and was asking if it is alright to reschedule her appointments for tomorrow. I informed him they should definitely reschedule her office visit and testing. They were in the car, and I told them to call back this afternoon to reschedule. The pt expressed understanding and agreement. Pt has no further questions or concerns at this time.

## 2022-04-01 RX ORDER — LISINOPRIL 10 MG/1
TABLET ORAL
Qty: 45 TABLET | Refills: 0 | Status: SHIPPED | OUTPATIENT
Start: 2022-04-01 | End: 2022-04-27 | Stop reason: SDUPTHER

## 2022-04-01 NOTE — TELEPHONE ENCOUNTER
Refill per VO of Dr. Mckeon Salts:    Last appt: 11/19/2020    Future Appointments   Date Time Provider Zaira Yuli   4/1/2022 10:00 AM [facility] AMB   4/1/2022 11:00 AM Leodan Koch MD [facility] AMB       Requested Prescriptions     Pending Prescriptions Disp Refills    lisinopriL (PRINIVIL, ZESTRIL) 10 mg tablet [Pharmacy Med Name: LISINOPRIL 10 MG TABLET] 35 Tablet 0     Sig: TAKE 1 TABLET BY MOUTH EVERY DAY

## 2022-04-04 DIAGNOSIS — E78.5 DYSLIPIDEMIA: ICD-10-CM

## 2022-04-04 RX ORDER — LISINOPRIL 10 MG/1
10 TABLET ORAL DAILY
Qty: 45 TABLET | Refills: 0 | OUTPATIENT
Start: 2022-04-04

## 2022-04-04 RX ORDER — ROSUVASTATIN CALCIUM 20 MG/1
TABLET, COATED ORAL
Qty: 30 TABLET | Refills: 0 | Status: SHIPPED | OUTPATIENT
Start: 2022-04-04 | End: 2022-04-28

## 2022-04-04 RX ORDER — FENOFIBRIC ACID 135 MG/1
CAPSULE, DELAYED RELEASE ORAL
Qty: 30 CAPSULE | Refills: 0 | Status: SHIPPED | OUTPATIENT
Start: 2022-04-04 | End: 2022-04-27 | Stop reason: SDUPTHER

## 2022-04-04 NOTE — TELEPHONE ENCOUNTER
Refill per VO of Dr. Maurine Goldmann:    Last appt: 4/1/2022    Future Appointments   Date Time Provider Zaira Yuli   4/27/2022  4:00 PM Rathi, Robbi Frankel, MD [facility] AMB       Requested Prescriptions     Pending Prescriptions Disp Refills    fenofibric acid (TRILIPIX ER) 135 mg capsule 30 Capsule 0     Sig: TAKE 1 CAPSULE DAILY AT NIGHT    rosuvastatin (CRESTOR) 20 mg tablet 30 Tablet 0     Sig: TAKE 1 TABLET NIGHTLY     Refused Prescriptions Disp Refills    lisinopriL (PRINIVIL, ZESTRIL) 10 mg tablet 45 Tablet 0     Sig: Take 1 Tablet by mouth daily.

## 2022-04-27 ENCOUNTER — OFFICE VISIT (OUTPATIENT)
Dept: CARDIOLOGY CLINIC | Age: 82
End: 2022-04-27
Payer: MEDICARE

## 2022-04-27 VITALS
OXYGEN SATURATION: 99 % | HEIGHT: 65 IN | WEIGHT: 173 LBS | SYSTOLIC BLOOD PRESSURE: 120 MMHG | DIASTOLIC BLOOD PRESSURE: 70 MMHG | HEART RATE: 74 BPM | BODY MASS INDEX: 28.82 KG/M2

## 2022-04-27 DIAGNOSIS — I10 ESSENTIAL HYPERTENSION: Primary | ICD-10-CM

## 2022-04-27 DIAGNOSIS — I25.10 CORONARY ARTERY DISEASE DUE TO LIPID RICH PLAQUE: ICD-10-CM

## 2022-04-27 DIAGNOSIS — R01.1 MURMUR: ICD-10-CM

## 2022-04-27 DIAGNOSIS — E78.5 DYSLIPIDEMIA: ICD-10-CM

## 2022-04-27 DIAGNOSIS — R06.09 DOE (DYSPNEA ON EXERTION): ICD-10-CM

## 2022-04-27 DIAGNOSIS — I25.83 CORONARY ARTERY DISEASE DUE TO LIPID RICH PLAQUE: ICD-10-CM

## 2022-04-27 PROCEDURE — G8419 CALC BMI OUT NRM PARAM NOF/U: HCPCS | Performed by: INTERNAL MEDICINE

## 2022-04-27 PROCEDURE — G8427 DOCREV CUR MEDS BY ELIG CLIN: HCPCS | Performed by: INTERNAL MEDICINE

## 2022-04-27 PROCEDURE — G8432 DEP SCR NOT DOC, RNG: HCPCS | Performed by: INTERNAL MEDICINE

## 2022-04-27 PROCEDURE — G8754 DIAS BP LESS 90: HCPCS | Performed by: INTERNAL MEDICINE

## 2022-04-27 PROCEDURE — G8536 NO DOC ELDER MAL SCRN: HCPCS | Performed by: INTERNAL MEDICINE

## 2022-04-27 PROCEDURE — 1090F PRES/ABSN URINE INCON ASSESS: CPT | Performed by: INTERNAL MEDICINE

## 2022-04-27 PROCEDURE — G8752 SYS BP LESS 140: HCPCS | Performed by: INTERNAL MEDICINE

## 2022-04-27 PROCEDURE — G8400 PT W/DXA NO RESULTS DOC: HCPCS | Performed by: INTERNAL MEDICINE

## 2022-04-27 PROCEDURE — 99214 OFFICE O/P EST MOD 30 MIN: CPT | Performed by: INTERNAL MEDICINE

## 2022-04-27 PROCEDURE — 1101F PT FALLS ASSESS-DOCD LE1/YR: CPT | Performed by: INTERNAL MEDICINE

## 2022-04-27 PROCEDURE — 93000 ELECTROCARDIOGRAM COMPLETE: CPT | Performed by: INTERNAL MEDICINE

## 2022-04-27 RX ORDER — LANOLIN ALCOHOL/MO/W.PET/CERES
CREAM (GRAM) TOPICAL
COMMUNITY

## 2022-04-27 RX ORDER — UREA 10 %
100 LOTION (ML) TOPICAL DAILY
COMMUNITY

## 2022-04-27 RX ORDER — DIPHENHYDRAMINE HCL 25 MG
25 CAPSULE ORAL
COMMUNITY

## 2022-04-27 NOTE — PROGRESS NOTES
Office Follow-up    NAME: Saba Villar   :  1940  MRM:  [id number]    Date:  2022            Assessment:     Problem List  Date Reviewed: 2019          Codes Class Noted    Type 2 diabetes with nephropathy ([facility] ICD-10-CM: E11.21  ICD-9-CM: 250.40, 583.81  3/26/2018        SOB (shortness of breath) ICD-10-CM: R06.02  ICD-9-CM: 786.05          Systolic CHF, acute ([facility] ICD-10-CM: I50.21  ICD-9-CM: 428.21, 428.0  2017        NSTEMI (non-ST elevated myocardial infarction) ([facility] ICD-10-CM: I21.4  ICD-9-CM: 410.70  2017        Chest pain ICD-10-CM: R07.9  ICD-9-CM: 786.50  2/15/2017        HTN (hypertension) ICD-10-CM: I10  ICD-9-CM: 401.9  2/15/2017        Dyslipidemia ICD-10-CM: E78.5  ICD-9-CM: 272.4  2015        Takotsubo cardiomyopathy ICD-10-CM: I51.81  ICD-9-CM: 429.83  2012        CAD (coronary artery disease) ICD-10-CM: I25.10  ICD-9-CM: 414.00  2012    Overview Addendum 2012 12:20 PM by Stuart Mendez MD     CATH 12  Mid LAD: There was a diffuse 30 % stenosis. Distal LAD: There was a diffuse 20 % stenosis. Proximal circumflex: There was a discrete 60 % stenosis. Mid circumflex: There was a diffuse 60 % stenosis. 1st obtuse marginal: There was a discrete 20 % stenosis in the proximal third of the vessel segment. Proximal RCA: There was a discrete 20 % stenosis at a site with no prior intervention. Mid RCA: There was a discrete 20 % stenosis. Distal RCA: There was a 20 % stenosis. DM (diabetes mellitus) ([facility] ICD-10-CM: E11.9  ICD-9-CM: 250.00  2012                 Plan:     1. CAD/LCx ALETHEA 2017: Stress Nuc was normal in 2020. Residual LAD and obtuse marginal lesions: Continue Aspirin and Crestor. 2. Dyslipidemia: Continue Crestor. Continue diet control. No new lipid profile. 3. Hypertension: Blood pressure is controlled. Continue current medications. 4. History of Takotsubo cardiomyopathy.   LVEF has improved to near normal.  Not on diuretics anymore. 5. Murmur: Check Echo in 1 year. 6. Carotid Dz: Check b/l Carotid US next visit. 7. Follow-up in 1 year with same day Carotid US and Echo. Subjective:     Abdi Broussard, a 80y.o. year-old who presents for followup. She has known history of CAD, LCx ALETHEA with residual LAD and obtuse marginal disease. She underwent PCI of the LCx in February 2017. She is returning today for follow-up. She had UTI on April 11th. She was at [facility] for 2 days for sepsis treatment. On April 17th and 18th she fell. Now she is on walker. She has background history of dyslipidemia, hypertension, Takotsubo cardiomyopathy and diabetes. ECG: normal sinus rhythm, normal ST segment, normal axis, normal intervals. Exam:     Physical Exam:  Visit Vitals  /70 (BP 1 Location: Left upper arm, BP Patient Position: Sitting, BP Cuff Size: Adult)   Pulse 74   Ht 5' 5\" (1.651 m)   Wt 173 lb (78.5 kg)   SpO2 99%   BMI 28.79 kg/m²     General appearance - alert, well appearing, and in no distress  Mental status - affect appropriate to mood  Eyes - sclera anicteric, moist mucous membranes  Neck - supple, no significant adenopathy  Chest - clear to auscultation, no wheezes, rales or rhonchi  Heart - normal rate, regular rhythm, normal S1, S2, ESM grade 3/6 in left parasternal region. No rubs, clicks or gallops  Abdomen - soft, nontender, nondistended, no masses or organomegaly  Extremities - peripheral pulses normal, Trace pedal edema  Skin - normal coloration  no rashes    Medications:     Current Outpatient Medications   Medication Sig    cyanocobalamin (Vitamin B-12) 100 mcg tablet Take 100 mcg by mouth daily.  diphenhydrAMINE (BENADRYL) 25 mg capsule Take 25 mg by mouth every six (6) hours as needed.  ascorbic acid (VITAMIN C) 500 mg chewable tablet Take  by mouth.     fenofibric acid (TRILIPIX ER) 135 mg capsule TAKE 1 CAPSULE DAILY AT NIGHT    rosuvastatin (CRESTOR) 20 mg tablet TAKE 1 TABLET NIGHTLY    lisinopriL (PRINIVIL, ZESTRIL) 10 mg tablet TAKE 1 TABLET BY MOUTH EVERY DAY    carvediloL (COREG) 6.25 mg tablet TAKE 1 TABLET BY MOUTH TWICE A DAY    metFORMIN (GLUCOPHAGE) 500 mg tablet Take  by mouth daily.  doxycycline (VIBRA-TABS) 100 mg tablet Take 100 mg by mouth daily.  pregabalin (LYRICA) 150 mg capsule Take 1 Cap by mouth two (2) times a day. Max Daily Amount: 300 mg. Indications: FIBROMYALGIA    Omega-3-DHA-EPA-Fish Oil (FISH OIL) 1,000 mg (120 mg-180 mg) cap Take 1 Cap by mouth daily.  acetaminophen (TYLENOL) 500 mg tablet Take 1,000 mg by mouth every six (6) hours as needed for Pain.  co-enzyme Q-10 (CO Q-10) 50 mg capsule Take 100 mg by mouth nightly.  aspirin 81 mg chewable tablet Take 81 mg by mouth nightly.  therapeutic multivitamin (THERAGRAN) tablet Take 1 Tab by mouth daily.  traMADol (ULTRAM) 50 mg tablet Take 50 mg by mouth every six (6) hours as needed for Pain. Patient will take 2 tabs every 8 hours as needed (0900, 1600, 2100)    fluticasone (FLONASE) 50 mcg/actuation nasal spray 1 [place] by Both Nostrils route nightly.  bumetanide (BUMEX) 0.5 mg tablet TAKE 1 TABLET BY MOUTH TWO TIMES A DAY (Patient not taking: Reported on 4/27/2022)     No current facility-administered medications for this visit. Diagnostic Data Review:       6/30/17: ECHO- EF 60% AV trileaflets,Leaflets exhibited sclerosis w/o stenosis. 3/2/17: Carotid Duplex- 10-49% stenosis of the rt internal carotid and 10-49% stenosis of the lt internal carotid. 2/16/2017: CATH- LCX 70% FFR + 0.79   --- s/p ALETHEA (2.25x23)-> LCX  2/17/2017: ECHO- EF 35-40%, mid-distal AS/Septal/apical AK     12/4/13: STRESS NUC- Normal perfusion, LVEF- 80%. 3/22/13: ECHO- Normal LV function. 12/11/12: CATH- LAD: m30%, d: 20%, LCX: p60%, m:60%, OM1: 20%, RCA: p20%, m20%, d20%   2012: ECHO- EF 40%, Takotsubo Cardiomyopathy.  Mild LVH      Lab Review:     Lab Results   Component Value Date/Time    Cholesterol, total 116 02/17/2017 04:34 AM    HDL Cholesterol 54 02/17/2017 04:34 AM    LDL, calculated 34.2 02/17/2017 04:34 AM    Triglyceride 139 02/17/2017 04:34 AM    CHOL/HDL Ratio 2.1 02/17/2017 04:34 AM     Lab Results   Component Value Date/Time    Creatinine 1.57 (H) 05/24/2020 04:51 PM     Lab Results   Component Value Date/Time    BUN 43 (H) 05/24/2020 04:51 PM     Lab Results   Component Value Date/Time    Potassium 4.1 05/24/2020 04:51 PM     Lab Results   Component Value Date/Time    Hemoglobin A1c 5.8 02/17/2017 04:34 AM     Lab Results   Component Value Date/Time    HGB 12.1 05/24/2020 04:51 PM     Lab Results   Component Value Date/Time    PLATELET 980 01/87/8549 04:51 PM     No results for input(s): CPK, CKMB, TROIQ in the last 72 hours. No lab exists for component: CKQMB, CPKMB             ___________________________________________________    Romana Sidles.  Laurie Hernandez MD, [facility] [place]

## 2022-04-27 NOTE — TELEPHONE ENCOUNTER
Refill per VO of Dr. Erich Vela:    Last appt: 4/1/2022    Future Appointments   Date Time Provider Zaira Roldan   4/27/2022  4:00 PM Keila Koch MD [facility] BS AMB       Requested Prescriptions     Pending Prescriptions Disp Refills    rosuvastatin (CRESTOR) 20 mg tablet [Pharmacy Med Name: ROSUVASTATIN CALCIUM 20 MG TAB] 30 Tablet 0     Sig: TAKE 1 TABLET BY MOUTH EVERY DAY AT NIGHT

## 2022-04-27 NOTE — TELEPHONE ENCOUNTER
Patient is requesting 90 day supplies. Patient states that she is down to 12 pills for each and that it generally takes 7-10 days for it to come in the mail. Thanks.      Pharmacy confirmed

## 2022-04-27 NOTE — PROGRESS NOTES
Jed Sellers is a 80 y.o. female    Visit Vitals  /70 (BP 1 Location: Left upper arm, BP Patient Position: Sitting, BP Cuff Size: Adult)   Pulse 74   Ht 5' 5\" (1.651 m)   Wt 173 lb (78.5 kg)   SpO2 99%   BMI 28.79 kg/m²       Chief Complaint   Patient presents with    Coronary Artery Disease    Cholesterol Problem    Hypertension       Chest pain NO  SOB NO  Dizziness NO  Swelling NO  Recent hospital visit [facility] 4/11-13/22 UTI, DEHYDRATION  Refills NO  COVID VACCINE STATUS YES  HAD COVID?  YES    HAD TWO FALLS HURT KNEE

## 2022-04-28 RX ORDER — ROSUVASTATIN CALCIUM 20 MG/1
TABLET, COATED ORAL
Qty: 30 TABLET | Refills: 0 | OUTPATIENT
Start: 2022-04-28

## 2022-04-28 RX ORDER — FENOFIBRIC ACID 135 MG/1
CAPSULE, DELAYED RELEASE ORAL
Qty: 30 CAPSULE | Refills: 0 | OUTPATIENT
Start: 2022-04-28

## 2022-04-28 RX ORDER — ROSUVASTATIN CALCIUM 20 MG/1
TABLET, COATED ORAL
Qty: 90 TABLET | Refills: 4 | Status: SHIPPED | OUTPATIENT
Start: 2022-04-28 | End: 2022-07-28 | Stop reason: SDUPTHER

## 2022-04-28 RX ORDER — FENOFIBRIC ACID 135 MG/1
CAPSULE, DELAYED RELEASE ORAL
Qty: 90 CAPSULE | Refills: 4 | Status: SHIPPED | OUTPATIENT
Start: 2022-04-28

## 2022-04-28 RX ORDER — LISINOPRIL 10 MG/1
10 TABLET ORAL DAILY
Qty: 90 TABLET | Refills: 4 | Status: SHIPPED | OUTPATIENT
Start: 2022-04-28 | End: 2022-07-28 | Stop reason: SDUPTHER

## 2022-04-28 RX ORDER — LISINOPRIL 10 MG/1
10 TABLET ORAL DAILY
Qty: 45 TABLET | Refills: 0 | OUTPATIENT
Start: 2022-04-28

## 2022-04-28 NOTE — TELEPHONE ENCOUNTER
Refill per VO of Dr. Harris Baires:    Last appt: 4/27/2022    Future Appointments   Date Time Provider Zaira Roldan   4/28/2023  1:00 PM ECHO, CONCEPCION MELENDEZ AMB   4/28/2023  2:00 PM VASCULAR, CONCEPCION MELENDEZ AMB   4/28/2023  3:00 PM Nayan Koch MD [facility] BS AMB       Requested Prescriptions     Refused Prescriptions Disp Refills    fenofibric acid (TRILIPIX ER) 135 mg capsule 30 Capsule 0     Sig: TAKE 1 CAPSULE DAILY AT NIGHT     Refused By: Chela Tomas     Reason for Refusal: Request already responded to by other means (e.g. phone or fax)    rosuvastatin (CRESTOR) 20 mg tablet 30 Tablet 0     Sig: TAKE 1 TABLET NIGHTLY     Refused By: Chela Tomas     Reason for Refusal: Request already responded to by other means (e.g. phone or fax)    lisinopriL (PRINIVIL, ZESTRIL) 10 mg tablet 45 Tablet 0     Sig: Take 1 Tablet by mouth daily.      Refused By: Chela Tomas     Reason for Refusal: Request already responded to by other means (e.g. phone or fax)     Refill was denied because,  Jae Chamorro

## 2022-04-28 NOTE — PROGRESS NOTES
All orders entered per verbal orders of Dr. Errol Rodriguez with Dr. Kevin Benson in 1 year with same day Carotid US and Echo.

## 2022-05-09 RX ORDER — LISINOPRIL 10 MG/1
TABLET ORAL
Qty: 45 TABLET | OUTPATIENT
Start: 2022-05-09

## 2022-05-09 NOTE — TELEPHONE ENCOUNTER
Refill per VO of Dr. Erich Vela:    Last appt: 4/27/2022    Future Appointments   Date Time Provider Zaira Yuli   4/28/2023  1:00 PM ECHOCONCEPCION BS AMB   4/28/2023  2:00 PM VASCULARCONCEPCION AMB   4/28/2023  3:00 PM Keila Koch MD [facility] BS AMB       Requested Prescriptions     Pending Prescriptions Disp Refills    lisinopriL (PRINIVIL, ZESTRIL) 10 mg tablet [Pharmacy Med Name: LISINOPRIL 10 MG TABLET] 45 Tablet      Sig: TAKE 1 TABLET BY MOUTH EVERY DAY     Refill was denied because,  [address]

## 2022-06-13 RX ORDER — CARVEDILOL 6.25 MG/1
TABLET ORAL
Qty: 180 TABLET | Refills: 4 | Status: SHIPPED | OUTPATIENT
Start: 2022-06-13

## 2022-06-13 NOTE — TELEPHONE ENCOUNTER
Refill per VO of Dr. Nelly Mercado:    Last appt: 4/27/2022    Future Appointments   Date Time Provider Zaira Yuli   4/28/2023  1:00 PM ECHOCONCEPCION BS AMB   4/28/2023  2:00 PM CONCEPCION MEDELLIN BS AMB   4/28/2023  3:00 PM Pooja Koch MD [facility] BS AMB       Requested Prescriptions     Pending Prescriptions Disp Refills    carvediloL (COREG) 6.25 mg tablet 180 Tablet 4     Sig: TAKE 1 TABLET BY MOUTH TWICE A DAY

## 2022-07-28 DIAGNOSIS — E78.5 DYSLIPIDEMIA: ICD-10-CM

## 2022-07-28 RX ORDER — LISINOPRIL 10 MG/1
10 TABLET ORAL DAILY
Qty: 30 TABLET | Refills: 0 | Status: SHIPPED | OUTPATIENT
Start: 2022-07-28

## 2022-07-28 RX ORDER — ROSUVASTATIN CALCIUM 20 MG/1
TABLET, COATED ORAL
Qty: 30 TABLET | Refills: 0 | Status: SHIPPED | OUTPATIENT
Start: 2022-07-28

## 2022-07-28 NOTE — TELEPHONE ENCOUNTER
Refill per VO of Dr. Promise Lopez:    Last appt: 4/27/2022    Future Appointments   Date Time Provider Zaira Yuli   4/28/2023 12:30 PM VASCULAR[facility]CEPCION AMB   4/28/2023  1:30 PM VASCULARCONCEPCION AMB   4/28/2023  3:00 PM Lonnie Koch, MD BEJARANO BS AMB       Requested Prescriptions     Pending Prescriptions Disp Refills    rosuvastatin (CRESTOR) 20 mg tablet 90 Tablet 4     Sig: TAKE 1 TABLET BY MOUTH EVERY DAY AT NIGHT    lisinopriL (PRINIVIL, ZESTRIL) 10 mg tablet 90 Tablet 4     Sig: Take 1 Tablet by mouth in the morning.

## 2022-08-08 DIAGNOSIS — E78.5 DYSLIPIDEMIA: ICD-10-CM

## 2022-08-08 RX ORDER — FENOFIBRIC ACID 135 MG/1
CAPSULE, DELAYED RELEASE ORAL
Qty: 90 CAPSULE | Refills: 4 | OUTPATIENT
Start: 2022-08-08

## 2022-08-08 RX ORDER — ROSUVASTATIN CALCIUM 20 MG/1
TABLET, COATED ORAL
Qty: 30 TABLET | Refills: 0 | OUTPATIENT
Start: 2022-08-08

## 2022-08-08 NOTE — TELEPHONE ENCOUNTER
Refill per VO of Dr. Jenkins Comfort:    Last appt: 4/27/2022    Future Appointments   Date Time Provider Zaira Roldan   4/28/2023 12:30 PM VASCULARCONCEPCION BS AMB   4/28/2023  1:30 PM VASCULARCONCEPCION BS AMB   4/28/2023  3:00 PM Aditi Koch MD [facility] BS AMB       Requested Prescriptions     Pending Prescriptions Disp Refills    fenofibric acid (TRILIPIX ER) 135 mg capsule [Pharmacy Med Name: FENOFIBRIC ACID  MG CAP] 90 Capsule 4     Sig: TAKE 1 CAPSULE BY MOUTH EVERY NIGHT    rosuvastatin (CRESTOR) 20 mg tablet [Pharmacy Med Name: ROSUVASTATIN CALCIUM 20 MG TAB] 30 Tablet 0     Sig: TAKE 1 TABLET BY MOUTH EVERY DAY AT NIGHT       Refill was denied because,  PT GET THROUGH THE MAIL

## 2022-09-01 RX ORDER — LISINOPRIL 10 MG/1
TABLET ORAL
Qty: 30 TABLET | Refills: 0 | OUTPATIENT
Start: 2022-09-01

## 2022-09-01 NOTE — TELEPHONE ENCOUNTER
Refill per VO of Dr. Promise Lopez:    Last appt: 4/27/2022    Future Appointments   Date Time Provider Zaira Yuli   4/28/2023 12:30 PM VASCULARCONCEPCION AMB   4/28/2023  1:30 PM VASCULAR, CONCEPCION MELENDEZ AMB   4/28/2023  3:00 PM Lonnie Koch, MD BEJ[facility]O BS AMB       Requested Prescriptions     Pending Prescriptions Disp Refills    lisinopriL (PRINIVIL, ZESTRIL) 10 mg tablet [Pharmacy Med Name: LISINOPRIL 10 MG TABLET] 30 Tablet 0     Sig: TAKE 1 TABLET BY MOUTH EVERY DAY IN THE MORNING     Refill was denied because,  Pt gets through the mail    .

## 2023-02-13 ENCOUNTER — TELEPHONE (OUTPATIENT)
Dept: CARDIOLOGY CLINIC | Age: 83
End: 2023-02-13

## 2023-02-13 NOTE — TELEPHONE ENCOUNTER
Called patient 2/13/23 at 10:43am. I spoke with Ms. Hilario Martines but she was unable to hear anything I was saying. I offered messaging her on [employer] is she had it available. Could not hear me, ended up ending the call. I then called her daughter Carola Rogers at 10:46am and left a voicemail. If patient calls back please reschedule her Echo and Vascular as [facility] is no longer here.      Thank you,

## 2023-03-27 NOTE — CONSULTS
Leslie Alicea MD. [facility] [place]              Patient: Jimmy Vogt  : 1940      Today's Date: 2017          HISTORY OF PRESENT ILLNESS:     History of Present Illness:  Ms. Dyana Dahl is a 69 yo female with a history of CAD (NSTEMI with PCI LCX on 17), Takotsubo cardiomyopathy, DM who comes to hospital with complaint of SOB and orthopnea. She says she has had spells of SOB in which she has a hard time catching her breath for several minutes. It occurs randomly, but is worse lying down. Symptoms she said were present when she was here a couple of days ago (small NSTEMI and cardiomyopathy then), but has gotten worse. She denies any chest pressure of chest pain. She feels fine when I see her in the ER. She has been compliant with her DAPT. PAST MEDICAL HISTORY:     Past Medical History   Diagnosis Date    Arthritis     CAD (coronary artery disease)      NSTEMI with PCI LCX on 17    Cardiomyopathy ([address]      ECHO 2017; EF 35-40%, mid-distal AS/Septal/apical AK     Diabetes mellitus ([address]     Fibromyalgia     High cholesterol     Hypertension     NSTEMI (non-ST elevated myocardial infarction) ([address]      NSTEMI with PCI LCX on 17       Patient Active Problem List   Diagnosis Code    Takotsubo cardiomyopathy I51.81    CAD (coronary artery disease) I25.10    DM (diabetes mellitus) ([address] E11.9    Dyslipidemia E78.5    HTN (hypertension) I10    NSTEMI (non-ST elevated myocardial infarction) ([address] I21.4           Past Surgical History   Procedure Laterality Date    Hx orthopaedic      Hx gyn       hysterectomy           MEDICATIONS:     Current Outpatient Prescriptions   Medication Sig Dispense Refill    metFORMIN (GLUCOPHAGE) 1,000 mg tablet Take 1 Tab by mouth two (2) times daily (with meals). 1 Tab 1    ticagrelor (BRILINTA) 90 mg tablet Take 1 Tab by mouth every twelve (12) hours every twelve (12) hours. Indications:  Thrombosis Prevention after Subjective:       Patient ID: Jose Juan Shultz is a 33 y.o. male.    Vitals:  height is 6' (1.829 m) and weight is 99.8 kg (220 lb). His oral temperature is 98.7 °F (37.1 °C). His blood pressure is 152/97 (abnormal) and his pulse is 92. His respiration is 18 and oxygen saturation is 98%.     Chief Complaint: Hemorrhoids    This is a 33 y.o. male who presents today with a chief complaint of  hemorrhoid pain- started 3 days ago   Home Tx: ibuprofen. Reports onset after workout regimen    Pain  This is a new problem. The problem occurs constantly. The problem has been gradually worsening. Pertinent negatives include no abdominal pain or nausea.     Gastrointestinal:  Positive for rectal pain and hemorrhoids. Negative for abdominal pain, nausea, constipation, rectal bleeding and bowel incontinence.     Objective:      Physical Exam   Constitutional: He does not appear ill. No distress. normal  Cardiovascular: Normal rate, regular rhythm, normal heart sounds and normal pulses.   Pulmonary/Chest: Effort normal and breath sounds normal.   Abdominal: Normal appearance.   Genitourinary: Rectum:      Internal hemorrhoid (unable to visualize) present.      No rectal mass, anal fissure, tenderness, external hemorrhoid or abnormal anal tone.     Neurological: He is alert.   Nursing note and vitals reviewed.      Assessment:       1. Internal hemorrhoids        Suspect internal hemorrhoids. To see surgery for definitive treatment given persistence  Plan:         Internal hemorrhoids  -     lidocaine/hydrocortisone ac (LIDOCAINE HCL-HYDROCORTISON AC) 2 %-2 % (7 gram) Kit; Place 1 applicator rectally 2 (two) times daily.  Dispense: 1 kit; Refill: 1  -     hydrocortisone (ANUSOL-HC) 25 mg suppository; Place 1 suppository (25 mg total) rectally 2 (two) times daily. for 10 days  Dispense: 20 suppository; Refill: 0  -     Ambulatory referral/consult to General Surgery    Warm sitz baths                  PCI 60 Tab 11    aspirin 81 mg chewable tablet Take 81 mg by mouth daily.  therapeutic multivitamin (THERAGRAN) tablet Take 1 Tab by mouth daily.  conjugated estrogens (PREMARIN) 0.625 mg tablet Take 0.625 mg by mouth every evening.  pregabalin (LYRICA) 150 mg capsule Take 150 mg by mouth two (2) times a day.  rosuvastatin (CRESTOR) 20 mg tablet TAKE 1 TABLET NIGHTLY (Patient taking differently: Take 20 mg by mouth nightly.) 90 Tab 0    carvedilol (COREG) 3.125 mg tablet TAKE 1 TABLET TWICE A  Tab 1    lisinopril (PRINIVIL, ZESTRIL) 10 mg tablet TAKE 1 TABLET DAILY (Patient taking differently: Take 5 mg by mouth daily.) 90 Tab 3    omega-3 fatty acids-vitamin e (FISH OIL) 1,000 mg cap Take 1 Cap by mouth.  ergocalciferol (VITAMIN D2) 50,000 unit capsule Take 50,000 Units by mouth every Sunday.  traMADol (ULTRAM) 50 mg tablet Take 50 mg by mouth every six (6) hours as needed for Pain. Patient will take 2 tabs every 8 hours as needed      fluticasone (FLONASE) 50 mcg/actuation nasal spray 2 Sprays by Both Nostrils route nightly.  co-enzyme Q-10 (CO Q-10) 50 mg capsule Take 2 Caps by mouth daily. 60 Cap 3    fenofibric acid (TRILIPIX) 135 mg capsule Take 135 mg by mouth daily. No Known Allergies            SOCIAL HISTORY:     Social History   Substance Use Topics    Smoking status: Never Smoker    Smokeless tobacco: None    Alcohol use No           FAMILY HISTORY:     Family History   Problem Relation Age of Onset    Heart Attack Father     High Cholesterol Father                REVIEW OF SYMPTOMS:     Review of Symptoms:  Constitutional: Negative for fever, chills  HEENT: Negative for nosebleeds, tinnitus, and vision changes. Respiratory: Negative for productive cough   Cardiovascular: Negative for  claudication, syncope   Gastrointestinal: Negative for abdominal pain, diarrhea, melena.    Genitourinary: Negative for dysuria  Musculoskeletal: Negative for myalgias. Skin: Negative for rash  Heme: No problems bleeding. Neurological: Negative for speech change and focal weakness. + chronic fatigue, + orthopnea            PHYSICAL EXAM:     Physical Exam:  Visit Vitals    /86    Pulse 89    Temp 97.5 °F (36.4 °C)    Resp 16    Ht 5' 6\" (1.676 m)    Wt 183 lb (83 kg)    SpO2 99%    BMI 29.54 kg/m2     Patient appears generally well, mood and affect are appropriate and pleasant. HEENT:  Hearing intact, non-icteric, normocephalic, atraumatic. Neck Exam: Supple, No carotid bruits. + slight JVD and hepatojugular reflex at 45 degrees   Lung Exam: Clear to auscultation, even breath sounds. Cardiac Exam: Regular rate and rhythm with no murmur  Abdomen: Soft, non-tender, normal bowel sounds. No bruits or masses. Extremities: Moves all ext well. No lower extremity edema. Vascular: 2+ dorsalis pedis pulses bilaterally. Psych: Appropriate affect  Neuro - Grossly intact          LABS / OTHER STUDIES:     Recent Results (from the past 24 hour(s))   POC TROPONIN-I    Collection Time: 02/20/17  9:11 AM   Result Value Ref Range    Troponin-I (POC) 0.07 0.00 - 0.08 ng/mL               CARDIAC DIAGNOSTICS:     Cardiac Evaluation Includes:  ECHO 2012: EF 40%, Takotsubo Cardiomyopathy. Mild LVH  ECHO 3/22/13: Normal LV function. ECHO 2/17/2017; EF 35-40%, mid-distal AS/Septal/apical AK         STRESS: Nuc 12/4/13: Normal perfusion, LVEF- 80%.     CATH: :12/11/12 : LAD: m30%, d: 20%, LCX: p60%, m:60%, OM1: 20%, RCA: p20%, m20%, d20%   CATH 2/16/2017: LCX 70% FFR + 0.79  --- s/p ALETHEA (2.25x23)-> LCX      EKG 2/20/17 - NSR, frequent PVC's, anteroseptal infarct age undetermined             ASSESSMENT AND PLAN:     Assessment and Plan:    1) EKG abnormality.   - EKG 2/20/17 - NSR, frequent PVC's, anteroseptal infarct age undetermined. ST elevation in V1 and V2.    - I viewed current EKG and compared it to prior ones.   She has chronic septal Q's with slight ST elevation previously. Current EKG does show more pronounced ST elevation in the septal leads only. Difficult to call this a STEMI in a setting of prior Q's and known septal/AS akinesis. - Symptoms also are not consistent with a STEMI (symptoms where present before she was discharged from the hospital on 2/17/17.    - Troponin came back at 0.07 which is low (was 1.45 at 2/15/17)   - Would admit for observation and serial markers. Would try and diurese for her SOB / orthopnea. 2) SOB / Orthopnea and possible acute CHF (HFrEF)   - based on symptoms, suspect some volume overload   - Awaiting CXR and proBNP results  - Once have labwork back would consider IV diuresis and discharge her on an oral diuretic   - Also of note, Radha Hartman is known in around 15% of people which typically can last one week. Her symptoms can in part be related to this    3) Cardiomyopathy with moderate LV dysfunction and CAD (PCI 2/16/17)   - cont coreg and lisinopril   - cont statin and DAPT  - May add a diuretic as discussed above     4) Would admit for OBSERVATION - will discuss with hospitalist     Bharathi Chun MD, [place] [address]  [address] [facility], [address]  [address] [place] [address]  [address] [facility] [address], [address], [address] [place] [address]  Ph: [phone]   Ph [phone]

## 2023-04-18 ENCOUNTER — ANCILLARY PROCEDURE (OUTPATIENT)
Dept: CARDIOLOGY CLINIC | Age: 83
End: 2023-04-18
Payer: MEDICARE

## 2023-04-18 ENCOUNTER — ANCILLARY PROCEDURE (OUTPATIENT)
Dept: CARDIOLOGY CLINIC | Age: 83
End: 2023-04-18

## 2023-04-18 VITALS
WEIGHT: 173 LBS | SYSTOLIC BLOOD PRESSURE: 122 MMHG | BODY MASS INDEX: 28.82 KG/M2 | HEIGHT: 65 IN | DIASTOLIC BLOOD PRESSURE: 76 MMHG

## 2023-04-18 DIAGNOSIS — R01.1 MURMUR: ICD-10-CM

## 2023-04-18 DIAGNOSIS — I25.10 CORONARY ARTERY DISEASE DUE TO LIPID RICH PLAQUE: ICD-10-CM

## 2023-04-18 DIAGNOSIS — I25.83 CORONARY ARTERY DISEASE DUE TO LIPID RICH PLAQUE: ICD-10-CM

## 2023-04-18 LAB
LEFT CCA DIST DIAS: 12.3 CM/S
LEFT CCA DIST SYS: 66.1 CM/S
LEFT CCA PROX DIAS: 10.3 CM/S
LEFT CCA PROX SYS: 82.3 CM/S
LEFT ECA DIAS: 4.84 CM/S
LEFT ECA SYS: 86.1 CM/S
LEFT ICA DIST DIAS: 10 CM/S
LEFT ICA DIST SYS: 46.1 CM/S
LEFT ICA MID DIAS: 8.6 CM/S
LEFT ICA MID SYS: 46.8 CM/S
LEFT ICA PROX DIAS: 8.7 CM/S
LEFT ICA PROX SYS: 52.2 CM/S
LEFT ICA/CCA SYS: 0.63 NO UNITS
LEFT VERTEBRAL DIAS: 8.36 CM/S
LEFT VERTEBRAL SYS: 36 CM/S
RIGHT CCA DIST DIAS: 10.8 CM/S
RIGHT CCA DIST SYS: 72.4 CM/S
RIGHT CCA PROX DIAS: 11.8 CM/S
RIGHT CCA PROX SYS: 109.1 CM/S
RIGHT ECA DIAS: 4.07 CM/S
RIGHT ECA SYS: 40.1 CM/S
RIGHT ICA DIST DIAS: 13.9 CM/S
RIGHT ICA DIST SYS: 56.4 CM/S
RIGHT ICA MID DIAS: 10.2 CM/S
RIGHT ICA MID SYS: 49.6 CM/S
RIGHT ICA PROX DIAS: 10.2 CM/S
RIGHT ICA PROX SYS: 54.2 CM/S
RIGHT ICA/CCA SYS: 0.5 NO UNITS
RIGHT VERTEBRAL DIAS: 7.71 CM/S
RIGHT VERTEBRAL SYS: 42.9 CM/S

## 2023-04-18 PROCEDURE — 93306 TTE W/DOPPLER COMPLETE: CPT | Performed by: INTERNAL MEDICINE

## 2023-04-18 PROCEDURE — 93880 EXTRACRANIAL BILAT STUDY: CPT | Performed by: SPECIALIST

## 2023-04-19 LAB
ECHO AO ROOT DIAM: 3.1 CM
ECHO AO ROOT INDEX: 1.67 CM/M2
ECHO LA DIAMETER INDEX: 1.94 CM/M2
ECHO LA DIAMETER: 3.6 CM
ECHO LA TO AORTIC ROOT RATIO: 1.16
ECHO LA VOL 2C: 41 ML (ref 22–52)
ECHO LA VOL 4C: 51 ML (ref 22–52)
ECHO LA VOL BP: 46 ML (ref 22–52)
ECHO LA VOL/BSA BIPLANE: 25 ML/M2 (ref 16–34)
ECHO LA VOLUME AREA LENGTH: 48 ML
ECHO LA VOLUME INDEX A2C: 22 ML/M2 (ref 16–34)
ECHO LA VOLUME INDEX A4C: 27 ML/M2 (ref 16–34)
ECHO LA VOLUME INDEX AREA LENGTH: 26 ML/M2 (ref 16–34)
ECHO LV E' LATERAL VELOCITY: 9 CM/S
ECHO LV E' SEPTAL VELOCITY: 7 CM/S
ECHO LV EDV A2C: 59 ML
ECHO LV EDV A4C: 57 ML
ECHO LV EDV BP: 59 ML (ref 56–104)
ECHO LV EDV INDEX A4C: 31 ML/M2
ECHO LV EDV INDEX BP: 32 ML/M2
ECHO LV EDV NDEX A2C: 32 ML/M2
ECHO LV EJECTION FRACTION A2C: 58 %
ECHO LV EJECTION FRACTION A4C: 54 %
ECHO LV EJECTION FRACTION BIPLANE: 56 % (ref 55–100)
ECHO LV ESV A2C: 24 ML
ECHO LV ESV A4C: 27 ML
ECHO LV ESV BP: 26 ML (ref 19–49)
ECHO LV ESV INDEX A2C: 13 ML/M2
ECHO LV ESV INDEX A4C: 15 ML/M2
ECHO LV ESV INDEX BP: 14 ML/M2
ECHO LV FRACTIONAL SHORTENING: 29 % (ref 28–44)
ECHO LV INTERNAL DIMENSION DIASTOLE INDEX: 2.58 CM/M2
ECHO LV INTERNAL DIMENSION DIASTOLIC: 4.8 CM (ref 3.9–5.3)
ECHO LV INTERNAL DIMENSION SYSTOLIC INDEX: 1.83 CM/M2
ECHO LV INTERNAL DIMENSION SYSTOLIC: 3.4 CM
ECHO LV IVSD: 0.9 CM (ref 0.6–0.9)
ECHO LV MASS 2D: 147.8 G (ref 67–162)
ECHO LV MASS INDEX 2D: 79.5 G/M2 (ref 43–95)
ECHO LV POSTERIOR WALL DIASTOLIC: 0.9 CM (ref 0.6–0.9)
ECHO LV RELATIVE WALL THICKNESS RATIO: 0.38
ECHO MV A VELOCITY: 1.03 M/S
ECHO MV AREA PHT: 3.3 CM2
ECHO MV E DECELERATION TIME (DT): 228.7 MS
ECHO MV E VELOCITY: 0.51 M/S
ECHO MV E/A RATIO: 0.5
ECHO MV E/E' LATERAL: 5.67
ECHO MV E/E' RATIO (AVERAGED): 6.48
ECHO MV E/E' SEPTAL: 7.29
ECHO MV PRESSURE HALF TIME (PHT): 66.3 MS
ECHO RV FREE WALL PEAK S': 12 CM/S
ECHO RV INTERNAL DIMENSION: 2.8 CM
ECHO RV TAPSE: 1.9 CM (ref 1.7–?)

## 2023-05-15 ENCOUNTER — OFFICE VISIT (OUTPATIENT)
Age: 83
End: 2023-05-15
Payer: MEDICARE

## 2023-05-15 VITALS
OXYGEN SATURATION: 97 % | DIASTOLIC BLOOD PRESSURE: 60 MMHG | SYSTOLIC BLOOD PRESSURE: 122 MMHG | HEIGHT: 65 IN | BODY MASS INDEX: 31.12 KG/M2 | HEART RATE: 73 BPM | WEIGHT: 186.8 LBS

## 2023-05-15 DIAGNOSIS — E78.5 HYPERLIPIDEMIA, UNSPECIFIED HYPERLIPIDEMIA TYPE: ICD-10-CM

## 2023-05-15 DIAGNOSIS — I10 ESSENTIAL (PRIMARY) HYPERTENSION: ICD-10-CM

## 2023-05-15 DIAGNOSIS — R01.1 CARDIAC MURMUR, UNSPECIFIED: ICD-10-CM

## 2023-05-15 DIAGNOSIS — I25.83 CORONARY ATHEROSCLEROSIS DUE TO LIPID RICH PLAQUE (CODE): Primary | ICD-10-CM

## 2023-05-15 PROCEDURE — 3074F SYST BP LT 130 MM HG: CPT | Performed by: INTERNAL MEDICINE

## 2023-05-15 PROCEDURE — 99214 OFFICE O/P EST MOD 30 MIN: CPT | Performed by: INTERNAL MEDICINE

## 2023-05-15 PROCEDURE — 3078F DIAST BP <80 MM HG: CPT | Performed by: INTERNAL MEDICINE

## 2023-05-15 PROCEDURE — 1123F ACP DISCUSS/DSCN MKR DOCD: CPT | Performed by: INTERNAL MEDICINE

## 2023-05-15 NOTE — PROGRESS NOTES
Chief Complaint   Patient presents with    Follow-up     murmur    Hypertension    Hyperlipidemia         Chest Pain- no    Palpitations- no    SOB- no, only on exertion     Dizziness-no    Swelling- in ankles    Refills- no    /60 (Site: Right Upper Arm, Position: Sitting)   Pulse 73   Ht 5' 5\" (1.651 m)   Wt 186 lb 12.8 oz (84.7 kg)   SpO2 97%   BMI 31.09 kg/m²       Have you been to the ER, urgent care clinic since your last visit? no  Hospitalized since your last visit? NO    2.  Have you seen or consulted with any other health care providers outside of the [address] [place] [address] since your last visit?  no

## 2023-05-15 NOTE — PROGRESS NOTES
Office Follow-up    NAME: Devon Patel   :  1940  MRM:  [id number]    Date:  2022            Assessment:     Problem List  Date Reviewed: 2019            Codes Class Noted    Type 2 diabetes with nephropathy ([facility] ICD-10-CM: E11.21  ICD-9-CM: 250.40, 583.81  3/26/2018        SOB (shortness of breath) ICD-10-CM: R06.02  ICD-9-CM: 786.05          Systolic CHF, acute ([facility] ICD-10-CM: I50.21  ICD-9-CM: 428.21, 428.0  2017        NSTEMI (non-ST elevated myocardial infarction) ([facility] ICD-10-CM: I21.4  ICD-9-CM: 410.70  2017        Chest pain ICD-10-CM: R07.9  ICD-9-CM: 786.50  2/15/2017        HTN (hypertension) ICD-10-CM: I10  ICD-9-CM: 401.9  2/15/2017        Dyslipidemia ICD-10-CM: E78.5  ICD-9-CM: 272.4  2015        Takotsubo cardiomyopathy ICD-10-CM: I51.81  ICD-9-CM: 429.83  2012        CAD (coronary artery disease) ICD-10-CM: I25.10  ICD-9-CM: 414.00  2012    Overview Addendum 2012 12:20 PM by Abena Austin MD     CATH 12  Mid LAD: There was a diffuse 30 % stenosis. Distal LAD: There was a diffuse 20 % stenosis. Proximal circumflex: There was a discrete 60 % stenosis. Mid circumflex: There was a diffuse 60 % stenosis. 1st obtuse marginal: There was a discrete 20 % stenosis in the proximal third of the vessel segment. Proximal RCA: There was a discrete 20 % stenosis at a site with no prior intervention. Mid RCA: There was a discrete 20 % stenosis. Distal RCA: There was a 20 % stenosis. DM (diabetes mellitus) ([facility] ICD-10-CM: E11.9  ICD-9-CM: 250.00  2012                   Plan:     CAD/LCx MAURICE 2017: Stress Nuc was normal in 2020. Residual LAD and obtuse marginal lesions: Continue Aspirin and Crestor. Dyslipidemia: Continue Crestor. Continue diet control. Hypertension: Blood pressure is controlled. Continue current medications. History of Takotsubo cardiomyopathy.   LVEF has improved normal.  Not on

## 2023-05-16 RX ORDER — LISINOPRIL 10 MG/1
TABLET ORAL
Qty: 90 TABLET | Refills: 3 | Status: SHIPPED | OUTPATIENT
Start: 2023-05-16

## 2024-02-23 ENCOUNTER — APPOINTMENT (OUTPATIENT)
Facility: HOSPITAL | Age: 84
End: 2024-02-23
Payer: MEDICARE

## 2024-02-23 ENCOUNTER — HOSPITAL ENCOUNTER (EMERGENCY)
Facility: HOSPITAL | Age: 84
Discharge: HOME | End: 2024-02-24
Attending: EMERGENCY MEDICINE
Payer: MEDICARE

## 2024-02-23 DIAGNOSIS — R11.2 NAUSEA AND VOMITING, UNSPECIFIED VOMITING TYPE: Primary | ICD-10-CM

## 2024-02-23 DIAGNOSIS — F03.90 DEMENTIA, UNSPECIFIED DEMENTIA SEVERITY, UNSPECIFIED DEMENTIA TYPE, UNSPECIFIED WHETHER BEHAVIORAL, PSYCHOTIC, OR MOOD DISTURBANCE OR ANXIETY (HCC): ICD-10-CM

## 2024-02-23 DIAGNOSIS — N39.0 URINARY TRACT INFECTION WITHOUT HEMATURIA, SITE UNSPECIFIED: ICD-10-CM

## 2024-02-23 LAB
ALBUMIN SERPL-MCNC: 3.3 G/DL (ref 3.5–5)
ALBUMIN/GLOB SERPL: 0.8 (ref 1.1–2.2)
ALP SERPL-CCNC: 34 U/L (ref 45–117)
ALT SERPL-CCNC: 21 U/L (ref 12–78)
ANION GAP BLD CALC-SCNC: 10 (ref 10–20)
ANION GAP SERPL CALC-SCNC: 6 MMOL/L (ref 5–15)
AST SERPL-CCNC: 18 U/L (ref 15–37)
BASOPHILS # BLD: 0 K/UL (ref 0–0.1)
BASOPHILS NFR BLD: 0 % (ref 0–1)
BILIRUB SERPL-MCNC: 0.3 MG/DL (ref 0.2–1)
BUN SERPL-MCNC: 34 MG/DL (ref 6–20)
BUN/CREAT SERPL: 22 (ref 12–20)
CA-I BLD-MCNC: 1.32 MMOL/L (ref 1.12–1.32)
CALCIUM SERPL-MCNC: 9.8 MG/DL (ref 8.5–10.1)
CHLORIDE BLD-SCNC: 113 MMOL/L (ref 100–108)
CHLORIDE SERPL-SCNC: 114 MMOL/L (ref 97–108)
CO2 BLD-SCNC: 20 MMOL/L (ref 19–24)
CO2 SERPL-SCNC: 21 MMOL/L (ref 21–32)
COMMENT:: NORMAL
CREAT SERPL-MCNC: 1.54 MG/DL (ref 0.55–1.02)
CREAT UR-MCNC: 1.3 MG/DL (ref 0.6–1.3)
DIFFERENTIAL METHOD BLD: ABNORMAL
EOSINOPHIL # BLD: 0 K/UL (ref 0–0.4)
EOSINOPHIL NFR BLD: 0 % (ref 0–7)
ERYTHROCYTE [DISTWIDTH] IN BLOOD BY AUTOMATED COUNT: 15.4 % (ref 11.5–14.5)
FLUAV AG NPH QL IA: NEGATIVE
FLUBV AG NOSE QL IA: NEGATIVE
GLOBULIN SER CALC-MCNC: 4.3 G/DL (ref 2–4)
GLUCOSE BLD STRIP.AUTO-MCNC: 188 MG/DL (ref 74–106)
GLUCOSE SERPL-MCNC: 194 MG/DL (ref 65–100)
HCT VFR BLD AUTO: 35.4 % (ref 35–47)
HGB BLD-MCNC: 11.3 G/DL (ref 11.5–16)
IMM GRANULOCYTES # BLD AUTO: 0 K/UL (ref 0–0.04)
IMM GRANULOCYTES NFR BLD AUTO: 0 % (ref 0–0.5)
LACTATE BLD-SCNC: 0.85 MMOL/L (ref 0.4–2)
LYMPHOCYTES # BLD: 2.9 K/UL (ref 0.8–3.5)
LYMPHOCYTES NFR BLD: 28 % (ref 12–49)
MAGNESIUM SERPL-MCNC: 1.9 MG/DL (ref 1.6–2.4)
MCH RBC QN AUTO: 27.4 PG (ref 26–34)
MCHC RBC AUTO-ENTMCNC: 31.9 G/DL (ref 30–36.5)
MCV RBC AUTO: 85.7 FL (ref 80–99)
MONOCYTES # BLD: 0.7 K/UL (ref 0–1)
MONOCYTES NFR BLD: 7 % (ref 5–13)
NEUTS SEG # BLD: 6.8 K/UL (ref 1.8–8)
NEUTS SEG NFR BLD: 65 % (ref 32–75)
NRBC # BLD: 0 K/UL (ref 0–0.01)
NRBC BLD-RTO: 0 PER 100 WBC
PLATELET # BLD AUTO: 283 K/UL (ref 150–400)
PMV BLD AUTO: 9.7 FL (ref 8.9–12.9)
POTASSIUM BLD-SCNC: 4.3 MMOL/L (ref 3.5–5.5)
POTASSIUM SERPL-SCNC: 4.1 MMOL/L (ref 3.5–5.1)
PROT SERPL-MCNC: 7.6 G/DL (ref 6.4–8.2)
RBC # BLD AUTO: 4.13 M/UL (ref 3.8–5.2)
SARS-COV-2 RDRP RESP QL NAA+PROBE: NOT DETECTED
SERVICE CMNT-IMP: ABNORMAL
SODIUM BLD-SCNC: 143 MMOL/L (ref 136–145)
SODIUM SERPL-SCNC: 141 MMOL/L (ref 136–145)
SOURCE: NORMAL
SPECIMEN HOLD: NORMAL
SPECIMEN HOLD: NORMAL
SPECIMEN SITE: ABNORMAL
TROPONIN I SERPL HS-MCNC: 8 NG/L (ref 0–51)
WBC # BLD AUTO: 10.4 K/UL (ref 3.6–11)

## 2024-02-23 PROCEDURE — 87804 INFLUENZA ASSAY W/OPTIC: CPT

## 2024-02-23 PROCEDURE — 87077 CULTURE AEROBIC IDENTIFY: CPT

## 2024-02-23 PROCEDURE — 96361 HYDRATE IV INFUSION ADD-ON: CPT

## 2024-02-23 PROCEDURE — 85025 COMPLETE CBC W/AUTO DIFF WBC: CPT

## 2024-02-23 PROCEDURE — 96374 THER/PROPH/DIAG INJ IV PUSH: CPT

## 2024-02-23 PROCEDURE — 93005 ELECTROCARDIOGRAM TRACING: CPT | Performed by: EMERGENCY MEDICINE

## 2024-02-23 PROCEDURE — 6360000002 HC RX W HCPCS: Performed by: EMERGENCY MEDICINE

## 2024-02-23 PROCEDURE — 80053 COMPREHEN METABOLIC PANEL: CPT

## 2024-02-23 PROCEDURE — 81001 URINALYSIS AUTO W/SCOPE: CPT

## 2024-02-23 PROCEDURE — 99285 EMERGENCY DEPT VISIT HI MDM: CPT

## 2024-02-23 PROCEDURE — 2580000003 HC RX 258: Performed by: EMERGENCY MEDICINE

## 2024-02-23 PROCEDURE — 36415 COLL VENOUS BLD VENIPUNCTURE: CPT

## 2024-02-23 PROCEDURE — 71045 X-RAY EXAM CHEST 1 VIEW: CPT

## 2024-02-23 PROCEDURE — 87635 SARS-COV-2 COVID-19 AMP PRB: CPT

## 2024-02-23 PROCEDURE — 87186 SC STD MICRODIL/AGAR DIL: CPT

## 2024-02-23 PROCEDURE — 87086 URINE CULTURE/COLONY COUNT: CPT

## 2024-02-23 PROCEDURE — 84484 ASSAY OF TROPONIN QUANT: CPT

## 2024-02-23 PROCEDURE — 80047 BASIC METABLC PNL IONIZED CA: CPT

## 2024-02-23 PROCEDURE — 83605 ASSAY OF LACTIC ACID: CPT

## 2024-02-23 PROCEDURE — 83735 ASSAY OF MAGNESIUM: CPT

## 2024-02-23 RX ORDER — 0.9 % SODIUM CHLORIDE 0.9 %
1000 INTRAVENOUS SOLUTION INTRAVENOUS ONCE
Status: COMPLETED | OUTPATIENT
Start: 2024-02-23 | End: 2024-02-24

## 2024-02-23 RX ORDER — ONDANSETRON 2 MG/ML
4 INJECTION INTRAMUSCULAR; INTRAVENOUS ONCE
Status: DISCONTINUED | OUTPATIENT
Start: 2024-02-23 | End: 2024-02-24 | Stop reason: HOSPADM

## 2024-02-23 RX ADMIN — SODIUM CHLORIDE 1000 ML: 9 INJECTION, SOLUTION INTRAVENOUS at 22:27

## 2024-02-23 ASSESSMENT — PAIN - FUNCTIONAL ASSESSMENT: PAIN_FUNCTIONAL_ASSESSMENT: NONE - DENIES PAIN

## 2024-02-24 VITALS
TEMPERATURE: 98.2 F | BODY MASS INDEX: 31.32 KG/M2 | RESPIRATION RATE: 16 BRPM | WEIGHT: 188 LBS | DIASTOLIC BLOOD PRESSURE: 55 MMHG | SYSTOLIC BLOOD PRESSURE: 105 MMHG | HEIGHT: 65 IN | HEART RATE: 65 BPM | OXYGEN SATURATION: 97 %

## 2024-02-24 LAB
APPEARANCE UR: ABNORMAL
BACTERIA URNS QL MICRO: ABNORMAL /HPF
BILIRUB UR QL: NEGATIVE
COLOR UR: YELLOW
EKG ATRIAL RATE: 88 BPM
EKG DIAGNOSIS: NORMAL
EKG P AXIS: 49 DEGREES
EKG P-R INTERVAL: 178 MS
EKG Q-T INTERVAL: 354 MS
EKG QRS DURATION: 82 MS
EKG QTC CALCULATION (BAZETT): 428 MS
EKG R AXIS: -1 DEGREES
EKG T AXIS: 89 DEGREES
EKG VENTRICULAR RATE: 88 BPM
EPITH CASTS URNS QL MICRO: ABNORMAL /LPF
GLUCOSE UR STRIP.AUTO-MCNC: NEGATIVE MG/DL
HGB UR QL STRIP: NEGATIVE
KETONES UR QL STRIP.AUTO: ABNORMAL MG/DL
LEUKOCYTE ESTERASE UR QL STRIP.AUTO: ABNORMAL
NITRITE UR QL STRIP.AUTO: POSITIVE
PH UR STRIP: 5 (ref 5–8)
PROT UR STRIP-MCNC: ABNORMAL MG/DL
RBC #/AREA URNS HPF: ABNORMAL /HPF (ref 0–5)
SP GR UR REFRACTOMETRY: 1.02 (ref 1–1.03)
UROBILINOGEN UR QL STRIP.AUTO: 1 EU/DL (ref 0.2–1)
WBC URNS QL MICRO: ABNORMAL /HPF (ref 0–4)

## 2024-02-24 PROCEDURE — 93010 ELECTROCARDIOGRAM REPORT: CPT | Performed by: INTERNAL MEDICINE

## 2024-02-24 PROCEDURE — 6360000002 HC RX W HCPCS: Performed by: EMERGENCY MEDICINE

## 2024-02-24 PROCEDURE — 2580000003 HC RX 258: Performed by: EMERGENCY MEDICINE

## 2024-02-24 RX ORDER — CEPHALEXIN 500 MG/1
500 CAPSULE ORAL 2 TIMES DAILY
Qty: 14 CAPSULE | Refills: 0 | Status: SHIPPED | OUTPATIENT
Start: 2024-02-24 | End: 2024-03-02

## 2024-02-24 RX ORDER — ONDANSETRON 4 MG/1
4 TABLET, ORALLY DISINTEGRATING ORAL EVERY 8 HOURS PRN
Qty: 30 TABLET | Refills: 0 | Status: SHIPPED | OUTPATIENT
Start: 2024-02-24

## 2024-02-24 RX ADMIN — WATER 1000 MG: 1 INJECTION INTRAMUSCULAR; INTRAVENOUS; SUBCUTANEOUS at 01:07

## 2024-02-24 ASSESSMENT — PAIN - FUNCTIONAL ASSESSMENT: PAIN_FUNCTIONAL_ASSESSMENT: NONE - DENIES PAIN

## 2024-02-24 NOTE — ED TRIAGE NOTES
Patient to room via wheelchair with c/o AMS, generalized fatigue and vomiting.    Per patient's , patient became more altered for the past three days along with generalized fatigue.     reports that the patient became hot this morning and threw up.    Patient's  reports that she is at the beginning stages of Alzheimer's.

## 2024-02-24 NOTE — ED PROVIDER NOTES
[facility] EMERGENCY DEPT  EMERGENCY DEPARTMENT ENCOUNTER      Pt Name: Marlen Marley  MRN: [id number]  Birthdate 1940  Date of evaluation: 2/23/2024  Provider: Hank Genao MD    CHIEF COMPLAINT       Chief Complaint   Patient presents with    Altered Mental Status    Emesis       EMERGENCY DEPARTMENT COURSE and DIFFERENTIAL DIAGNOSIS/MDM:   Medical Decision Making  83-year-old female brought into ER by her  with report of altered mental status.  For the last 3 to 4 days patient's been having nausea and episodes of vomiting decreased p.o. intake.  Reports of subjective fevers at home.  Has been having increased confusion from her baseline.  Has history of dementia with hallucinations.  Also history of diabetes on metformin, hyperlipidemia, CAD with stents, cardiomyopathy and hypertension.  No episodes of vomiting.  Patient has no complaints at this time.  Patient's  is concerned for UTI.  On exam patient has some dry mucous membranes borderline low blood pressure initially however on repeat exam without intervention blood pressure improved.  Patient has some dry mucous membranes on exam.  No focal neurologic deficits no report of any falls or head trauma no headache.  Patient answering questions appropriately but does have mild confusion which seems to be baseline according to patient's .  Ordered labs and electrolytes chest x-ray urinalysis COVID flu.  Will check labs.  Antiemetics due to report of episodes of vomiting the patient denies any abdominal pain or nausea at this time.  Normal lactate.  No leukocytosis.  Normal labs and electrolytes.  Chronic kidney disease unchanged previous.  Patient feeling better after receiving IV fluids.  Urine showing signs concerning for urinary tract infection we will send urine culture.  Patient will receive IV Rocephin and continue on oral antibiotics.  Family at bedside.  Family agreeable with plan.  Patient stable for discharge    Amount  Assessment  BP: (!) 105/55  Pulse: 65                 PHYSICAL EXAM       Vitals:    02/23/24 2214 02/23/24 2217 02/23/24 2315 02/24/24 0134   BP: (!) 97/55 (!) 113/52 (!) 122/44 (!) 105/55   Pulse:  89 82 65   Resp:  12 18 16   Temp:  97.8 °F (36.6 °C)  98.2 °F (36.8 °C)   TempSrc:  Oral  Oral   SpO2:  95% 95% 97%   Weight:       Height:           Body mass index is 31.28 kg/m².    Physical Exam  Vitals and nursing note reviewed.   Constitutional:       General: She is not in acute distress.     Appearance: Normal appearance.   HENT:      Head: Normocephalic.      Mouth/Throat:      Mouth: Mucous membranes are dry.      Pharynx: Oropharynx is clear.   Eyes:      Conjunctiva/sclera: Conjunctivae normal.   Cardiovascular:      Rate and Rhythm: Normal rate.   Pulmonary:      Effort: Pulmonary effort is normal. No respiratory distress.   Abdominal:      General: Abdomen is flat.      Palpations: Abdomen is soft.      Tenderness: There is no abdominal tenderness.   Musculoskeletal:         General: Normal range of motion.      Cervical back: Neck supple.   Skin:     General: Skin is warm.      Capillary Refill: Capillary refill takes less than 2 seconds.      Findings: No rash.   Neurological:      General: No focal deficit present.      Mental Status: She is alert. Mental status is at baseline. She is disoriented.      Sensory: No sensory deficit.      Motor: No weakness.         DIAGNOSTIC RESULTS     RADIOLOGY:   Interpretation per the Radiologist below, if available at the time of this note:    XR CHEST PORTABLE   Final Result      Left basilar opacity may represent infection or atelectasis. Follow-up to   resolution is recommended.              LABS:    Results for orders placed or performed during the hospital encounter of 02/23/24   COVID-19, Rapid    Specimen: Nasopharyngeal   Result Value Ref Range    Source NP     SARS-CoV-2, Rapid Not detected NOTD     Rapid influenza A/B antigens    Specimen: Nasopharyngeal

## 2024-02-25 LAB
BACTERIA SPEC CULT: ABNORMAL
CC UR VC: ABNORMAL
SERVICE CMNT-IMP: ABNORMAL

## 2024-02-27 LAB
BACTERIA SPEC CULT: ABNORMAL
CC UR VC: ABNORMAL
SERVICE CMNT-IMP: ABNORMAL

## 2024-04-29 ENCOUNTER — TELEPHONE (OUTPATIENT)
Age: 84
End: 2024-04-29

## 2024-04-29 NOTE — TELEPHONE ENCOUNTER
Patient's  is calling to see if his wife needs to have any upcoming labs before her upcoming apt.Please advise.      [phone]

## 2024-04-29 NOTE — TELEPHONE ENCOUNTER
Patient's  is calling to see if his wife needs to have any upcoming labs before her upcoming apt.Please advise.      [phone]  #################################    Spoke with Gallo, identified the pt with name and . I informed him the pt does not need to have any labs done prior to her appointment.  Gallo expressed understanding and agreement. Pt has no further questions or concerns at this time.

## 2024-05-11 ENCOUNTER — HOSPITAL ENCOUNTER (EMERGENCY)
Facility: HOSPITAL | Age: 84
Discharge: HOME | End: 2024-05-12
Attending: EMERGENCY MEDICINE
Payer: MEDICARE

## 2024-05-11 DIAGNOSIS — T50.901A ACCIDENTAL OVERDOSE, INITIAL ENCOUNTER: Primary | ICD-10-CM

## 2024-05-11 PROCEDURE — 99283 EMERGENCY DEPT VISIT LOW MDM: CPT

## 2024-05-11 ASSESSMENT — PAIN - FUNCTIONAL ASSESSMENT: PAIN_FUNCTIONAL_ASSESSMENT: NONE - DENIES PAIN

## 2024-05-12 VITALS
RESPIRATION RATE: 18 BRPM | HEART RATE: 70 BPM | OXYGEN SATURATION: 95 % | TEMPERATURE: 97.8 F | BODY MASS INDEX: 32.21 KG/M2 | DIASTOLIC BLOOD PRESSURE: 64 MMHG | SYSTOLIC BLOOD PRESSURE: 162 MMHG | HEIGHT: 65 IN | WEIGHT: 193.3 LBS

## 2024-05-12 PROCEDURE — 94761 N-INVAS EAR/PLS OXIMETRY MLT: CPT

## 2024-05-12 NOTE — DISCHARGE INSTRUCTIONS
You have been observed in the ER without any adverse effects from your accidental overdose. You may resume taking your medications as normal this morning.    If you have any questions about a medication reaction, overdose, toxin exposure, or animal bite, you may contact the [facility] 24/7 toll-free at [phone].

## 2024-05-12 NOTE — ED NOTES
Discharge instructions reviewed with patient and daughter. Opportunity to answer questions and provide clarification given.     Pt assisted to wheelchair and transported to family vehicle.    Attending

## 2024-05-12 NOTE — ED TRIAGE NOTES
Pt arrives with daughter for possible prescription drug overdose.     Patient takes 100mg tramadol, 150mg pregabalin, 135mg fenofibric acid, 20mg rosuvastatin, 10mg Lisinopril, 500mg Tylenol, 1 Advil, 50mg Benadryl, fish oil, Co-Q-10 100mg, multi vitamin, and vitamin c at bedtime.     Patient was found taking one extra dose of above medications aside from carvedilol. Patient took one extra tramadol (50mg)    Pt has hx dementia

## 2024-05-12 NOTE — ED PROVIDER NOTES
[facility] EMERGENCY DEPT  EMERGENCY DEPARTMENT ENCOUNTER      Pt Name: Marlen Marley  MRN: [id number]  Birthdate 1940  Date of evaluation: 5/11/2024  Provider: Carlos Alberto Quinonez MD    CHIEF COMPLAINT       Chief Complaint   Patient presents with    Medication Problem         HISTORY OF PRESENT ILLNESS   (Location/Symptom, Timing/Onset, Context/Setting, Quality, Duration, Modifying Factors, Severity)  Note limiting factors.   The history is provided by the patient.     83 y.o. female with PMHx significant for CAD, DM, HTN, HLD, arthritis with chronic pain, dementia presents to ED from home after accidental medication overdose. Pt's family has been attempting to help care for her, her  recently suffered CVA and is admitted to ICU, granddaughter stayed with pt and gave her night meds at 1900, then found pt taking doses of night meds shortly PTA. She took all of her night meds save for Coreg. She reports she feels \"fine\" and denies HA, tremor, vision changes, CP, dyspnea, tremors, N/V. Daughter contacted [facility], who recommended presentation to ED for further eval.    Nursing Notes were reviewed.    REVIEW OF SYSTEMS    (2-9 systems for level 4, 10 or more for level 5)     Review of Systems    Except as noted above the remainder of the review of systems was reviewed and negative.       PAST MEDICAL HISTORY     Past Medical History:   Diagnosis Date    Arthritis     CAD (coronary artery disease)     NSTEMI with PCI LCX on 2/16/17    Cardiomyopathy (HCC)     ECHO 2/17/2017; EF 35-40%, mid-distal AS/Septal/apical AK     Diabetes mellitus (HCC)     Fibromyalgia     High cholesterol     Hypertension     NSTEMI (non-ST elevated myocardial infarction) (HCC)     NSTEMI with PCI LCX on 2/16/17         SURGICAL HISTORY       Past Surgical History:   Procedure Laterality Date    CORONARY ANGIOPLASTY WITH STENT PLACEMENT  02/15/2017    GYN      hysterectomy    ORTHOPEDIC SURGERY           CURRENT MEDICATIONS

## 2024-05-13 RX ORDER — LISINOPRIL 10 MG/1
TABLET ORAL
Qty: 90 TABLET | Refills: 0 | Status: SHIPPED | OUTPATIENT
Start: 2024-05-13

## 2024-05-13 NOTE — TELEPHONE ENCOUNTER
Refill per VO of Dr. Christopher Martines:    5/15/2023    Future Appointments   Date Time Provider Department Center   5/31/2024 11:40 AM Christopher Martines MD [facility]       Requested Prescriptions     Pending Prescriptions Disp Refills    lisinopril (PRINIVIL;ZESTRIL) 10 MG tablet [Pharmacy Med Name: LISINOPRIL TABS 10MG] 90 tablet 3     Sig: TAKE 1 TABLET DAILY

## 2024-05-31 ENCOUNTER — OFFICE VISIT (OUTPATIENT)
Age: 84
End: 2024-05-31
Payer: MEDICARE

## 2024-05-31 VITALS
OXYGEN SATURATION: 98 % | HEIGHT: 65 IN | HEART RATE: 76 BPM | BODY MASS INDEX: 30.49 KG/M2 | SYSTOLIC BLOOD PRESSURE: 148 MMHG | WEIGHT: 183 LBS | DIASTOLIC BLOOD PRESSURE: 72 MMHG

## 2024-05-31 DIAGNOSIS — I10 ESSENTIAL (PRIMARY) HYPERTENSION: ICD-10-CM

## 2024-05-31 DIAGNOSIS — I50.21 SYSTOLIC CHF, ACUTE (HCC): ICD-10-CM

## 2024-05-31 DIAGNOSIS — E78.5 HYPERLIPIDEMIA, UNSPECIFIED HYPERLIPIDEMIA TYPE: ICD-10-CM

## 2024-05-31 DIAGNOSIS — I25.83 CORONARY ATHEROSCLEROSIS DUE TO LIPID RICH PLAQUE (CODE): Primary | ICD-10-CM

## 2024-05-31 PROCEDURE — 3077F SYST BP >= 140 MM HG: CPT | Performed by: INTERNAL MEDICINE

## 2024-05-31 PROCEDURE — 3078F DIAST BP <80 MM HG: CPT | Performed by: INTERNAL MEDICINE

## 2024-05-31 PROCEDURE — 99214 OFFICE O/P EST MOD 30 MIN: CPT | Performed by: INTERNAL MEDICINE

## 2024-05-31 PROCEDURE — 1123F ACP DISCUSS/DSCN MKR DOCD: CPT | Performed by: INTERNAL MEDICINE

## 2024-05-31 RX ORDER — FUROSEMIDE 20 MG/1
20 TABLET ORAL DAILY PRN
Qty: 90 TABLET | Refills: 3 | Status: SHIPPED | OUTPATIENT
Start: 2024-05-31

## 2024-05-31 NOTE — PROGRESS NOTES
Chief Complaint   Patient presents with    Coronary Artery Disease    Hypertension    Heart Murmur     Vitals:    05/31/24 1142   BP: (!) 148/72   Site: Left Upper Arm   Position: Sitting   Pulse: 76   SpO2: 98%   Weight: 83 kg (183 lb)   Height: 1.651 m (5' 5\")     Chest pain: DENIED     Recent hospital stays: DENIED     Refills: DENIED

## 2024-05-31 NOTE — PROGRESS NOTES
Office Follow-up    NAME: Marlen Marley   :  1940  MRM:  [id number]    Date:  24           Plan:     Unable to carry out ADL without support: She will benefit from assisted living given several comorbid conditions and her  is sick with 6 weeks to live.   Peripheral edema: Will start Lasix 20mg po as needed   CAD/LCx MAURICE : Stress Nuc was normal in 2020. Residual LAD and obtuse marginal lesions: Continue Aspirin and Crestor.   Dyslipidemia: Continue Crestor.  Continue diet control.   Hypertension: Blood pressure is controlled.  Continue current medications.    History of Takotsubo cardiomyopathy.  LVEF has improved normal.  Not on diuretics anymore.    Murmur: Sclerosis of the AV valve. Check Echo in .   Carotid Dz: mild left carotid plaque.   Follow-up with me in 6 months.                     Subjective:     Has been having issues about taking care of herself at home. No chest pain.     --------------  Marlen Marley, a 81 y.o. year-old who presents for followup.  She has known history of CAD, LCx MAURICE with residual LAD and obtuse marginal disease.  She underwent PCI of the LCx in 2017.  She is returning today for follow-up.      She had UTI on . She was at  for 2 days for sepsis treatment. On  and  she fell. Now she is on walker.     She has background history of dyslipidemia, hypertension, Takotsubo cardiomyopathy and diabetes.    ECG: normal sinus rhythm, normal ST segment, normal axis, normal intervals.      Exam:     Physical Exam:  Visit Vitals  BP (!) 148/72 (Site: Left Upper Arm, Position: Sitting)   Pulse 76   Ht 1.651 m (5' 5\")   Wt 83 kg (183 lb)   SpO2 98%   BMI 30.45 kg/m²       General appearance - alert, well appearing, and in no distress  Mental status - affect appropriate to mood  Eyes - sclera anicteric, moist mucous membranes  Neck - supple, no significant adenopathy  Chest - clear to auscultation, no wheezes, rales

## 2024-05-31 NOTE — PROGRESS NOTES
Received VO from Dr. Christopher Martines:  Lasix 20mg po daily as needed.     See Dr. Martines in 6 months.

## 2024-06-10 RX ORDER — FENOFIBRIC ACID 135 MG/1
CAPSULE, DELAYED RELEASE ORAL
Qty: 90 CAPSULE | Refills: 3 | OUTPATIENT
Start: 2024-06-10

## 2024-06-10 NOTE — TELEPHONE ENCOUNTER
Refill per VO of Dr. Christopher Martines:    5/31/2024    Future Appointments   Date Time Provider Department Center   11/22/2024 10:40 AM Christopher Martines MD [facility]       Requested Prescriptions     Pending Prescriptions Disp Refills    fenofibric acid (TRILIPIX) 135 MG CPDR capsule [Pharmacy Med Name: FENOFIBRIC ACID DR CAPS 135MG] 90 capsule 3     Sig: TAKE 1 CAPSULE DAILY AT NIGHT

## 2024-07-02 RX ORDER — ROSUVASTATIN CALCIUM 20 MG/1
20 TABLET, COATED ORAL NIGHTLY
Qty: 90 TABLET | Refills: 3 | Status: SHIPPED | OUTPATIENT
Start: 2024-07-02

## 2024-07-02 NOTE — TELEPHONE ENCOUNTER
Refill per VO of Dr. Christopher Martines:    5/31/2024    Future Appointments   Date Time Provider Department Center   11/22/2024 10:40 AM Christopher Martines MD [facility]       Requested Prescriptions     Pending Prescriptions Disp Refills    rosuvastatin (CRESTOR) 20 MG tablet [Pharmacy Med Name: ROSUVASTATIN TABS 20MG] 90 tablet 3     Sig: TAKE 1 TABLET NIGHTLY

## 2025-05-19 ENCOUNTER — OFFICE VISIT (OUTPATIENT)
Age: 85
End: 2025-05-19
Payer: MEDICARE

## 2025-05-19 VITALS
WEIGHT: 151 LBS | HEIGHT: 65 IN | SYSTOLIC BLOOD PRESSURE: 106 MMHG | OXYGEN SATURATION: 98 % | DIASTOLIC BLOOD PRESSURE: 60 MMHG | BODY MASS INDEX: 25.16 KG/M2 | HEART RATE: 69 BPM

## 2025-05-19 DIAGNOSIS — Z79.899 MEDICATION MANAGEMENT: ICD-10-CM

## 2025-05-19 DIAGNOSIS — I25.10 CORONARY ARTERY DISEASE DUE TO LIPID RICH PLAQUE: Primary | ICD-10-CM

## 2025-05-19 DIAGNOSIS — E78.5 HYPERLIPIDEMIA, UNSPECIFIED HYPERLIPIDEMIA TYPE: ICD-10-CM

## 2025-05-19 DIAGNOSIS — I10 ESSENTIAL (PRIMARY) HYPERTENSION: ICD-10-CM

## 2025-05-19 DIAGNOSIS — I25.83 CORONARY ARTERY DISEASE DUE TO LIPID RICH PLAQUE: Primary | ICD-10-CM

## 2025-05-19 PROCEDURE — 1123F ACP DISCUSS/DSCN MKR DOCD: CPT | Performed by: INTERNAL MEDICINE

## 2025-05-19 PROCEDURE — 99214 OFFICE O/P EST MOD 30 MIN: CPT | Performed by: INTERNAL MEDICINE

## 2025-05-19 PROCEDURE — 1159F MED LIST DOCD IN RCRD: CPT | Performed by: INTERNAL MEDICINE

## 2025-05-19 PROCEDURE — 1160F RVW MEDS BY RX/DR IN RCRD: CPT | Performed by: INTERNAL MEDICINE

## 2025-05-19 PROCEDURE — 3074F SYST BP LT 130 MM HG: CPT | Performed by: INTERNAL MEDICINE

## 2025-05-19 PROCEDURE — 3078F DIAST BP <80 MM HG: CPT | Performed by: INTERNAL MEDICINE

## 2025-05-19 RX ORDER — BUSPIRONE HYDROCHLORIDE 7.5 MG/1
7.5 TABLET ORAL 3 TIMES DAILY
COMMUNITY

## 2025-05-19 RX ORDER — ATORVASTATIN CALCIUM 40 MG/1
40 TABLET, FILM COATED ORAL DAILY
Qty: 90 TABLET | Refills: 3 | Status: SHIPPED | OUTPATIENT
Start: 2025-05-19

## 2025-05-19 RX ORDER — LISINOPRIL 10 MG/1
10 TABLET ORAL DAILY
Qty: 90 TABLET | Refills: 3 | Status: SHIPPED | OUTPATIENT
Start: 2025-05-19

## 2025-05-19 RX ORDER — CARVEDILOL 6.25 MG/1
6.25 TABLET ORAL 2 TIMES DAILY
Qty: 180 TABLET | Refills: 3 | Status: SHIPPED | OUTPATIENT
Start: 2025-05-19

## 2025-05-19 RX ORDER — FEXOFENADINE HCL 60 MG/1
60 TABLET, FILM COATED ORAL DAILY
COMMUNITY

## 2025-05-19 RX ORDER — AZELASTINE HYDROCHLORIDE 0.5 MG/ML
1 SOLUTION/ DROPS OPHTHALMIC 2 TIMES DAILY
COMMUNITY

## 2025-05-19 RX ORDER — ATORVASTATIN CALCIUM 40 MG/1
40 TABLET, FILM COATED ORAL DAILY
COMMUNITY
End: 2025-05-19 | Stop reason: SDUPTHER

## 2025-05-19 NOTE — PROGRESS NOTES
Office Follow-up    NAME: Marlen Marley   :  1940  MRM:  [id number]    Date:  25       Plan:     She is at nursing home.   No chest pain.   Moderate B/L LE edema.       Peripheral edema: Can use as needed Lasix.   CAD/LCx MAURICE 2017: Stress Nuc was normal in 2020. Residual LAD and obtuse marginal lesions: Continue Aspirin and Atorvastatin.   Dyslipidemia: Continue Atorvastatin.  Continue diet control.   Hypertension: Blood pressure is controlled.  Continue current medications.    History of Takotsubo cardiomyopathy.  LVEF has improved normal.  Not on diuretics anymore.    Murmur: Sclerosis of the AV valve. Check Echo in .   Carotid Dz: mild left carotid plaque.   See Dr. Martines in 1 yr with same day Echo. Check CBC, CMP, FLP, TSH and Vit D levels.        Subjective:       --------------  Marlen Marley, a 81 y.o. year-old who presents for followup.  She has known history of CAD, LCx MAURICE with residual LAD and obtuse marginal disease.  She underwent PCI of the LCx in 2017.  She is returning today for follow-up.      She had UTI on . She was at  for 2 days for sepsis treatment. On  and  she fell. Now she is on walker.     She has background history of dyslipidemia, hypertension, Takotsubo cardiomyopathy and diabetes.    ECG: normal sinus rhythm, normal ST segment, normal axis, normal intervals.      Exam:     Physical Exam:  Visit Vitals  /60 (BP Site: Left Upper Arm, Patient Position: Sitting, BP Cuff Size: Medium Adult)   Pulse 69   Ht 1.651 m (5' 5\")   Wt 68.5 kg (151 lb)   SpO2 98%   BMI 25.13 kg/m²       General appearance - alert, well appearing, and in no distress  Mental status - affect appropriate to mood  Eyes - sclera anicteric, moist mucous membranes  Neck - supple, no significant adenopathy  Chest - clear to auscultation, no wheezes, rales or rhonchi  Heart - normal rate, regular rhythm, normal S1, S2, ESM grade 3/6 in left

## 2025-05-19 NOTE — PROGRESS NOTES
Chief Complaint   Patient presents with    Coronary Artery Disease    Hypertension    Congestive Heart Failure     Vitals:    05/19/25 1253   BP: 106/60   BP Site: Left Upper Arm   Patient Position: Sitting   BP Cuff Size: Medium Adult   Pulse: 69   SpO2: 98%   Weight: 68.5 kg (151 lb)   Height: 1.651 m (5' 5\")      /60 (BP Site: Left Upper Arm, Patient Position: Sitting, BP Cuff Size: Medium Adult)   Pulse 69   Ht 1.651 m (5' 5\")   Wt 68.5 kg (151 lb)   SpO2 98%   BMI 25.13 kg/m²      PAPER SCRIPTS ONLY

## 2025-05-19 NOTE — PROGRESS NOTES
TRENTON. received from Dr. RENETTA Martines MD:    Check CBC, CMP, FLP, TSH and Vit D levels.    Lab orders and lab locations given to the pt    See Dr. Martines in 1 yr

## 2025-05-19 NOTE — PATIENT INSTRUCTIONS
Check CBC, CMP, FLP, TSH and Vit D levels.     See Dr. Martines in 1 yr with same day Echo for Murmur, CHF